# Patient Record
Sex: FEMALE | Race: BLACK OR AFRICAN AMERICAN | NOT HISPANIC OR LATINO | Employment: FULL TIME | URBAN - METROPOLITAN AREA
[De-identification: names, ages, dates, MRNs, and addresses within clinical notes are randomized per-mention and may not be internally consistent; named-entity substitution may affect disease eponyms.]

---

## 2017-02-13 ENCOUNTER — COMMUNICATION - HEALTHEAST (OUTPATIENT)
Dept: FAMILY MEDICINE | Facility: CLINIC | Age: 52
End: 2017-02-13

## 2017-02-13 DIAGNOSIS — F32.9 MAJOR DEPRESSIVE DISORDER, SINGLE EPISODE, UNSPECIFIED: ICD-10-CM

## 2017-02-22 ENCOUNTER — COMMUNICATION - HEALTHEAST (OUTPATIENT)
Dept: FAMILY MEDICINE | Facility: CLINIC | Age: 52
End: 2017-02-22

## 2017-02-22 ENCOUNTER — OFFICE VISIT - HEALTHEAST (OUTPATIENT)
Dept: FAMILY MEDICINE | Facility: CLINIC | Age: 52
End: 2017-02-22

## 2017-02-22 DIAGNOSIS — R50.9 FEVER: ICD-10-CM

## 2017-02-22 DIAGNOSIS — Z12.31 VISIT FOR SCREENING MAMMOGRAM: ICD-10-CM

## 2017-02-22 DIAGNOSIS — J02.0 ACUTE STREPTOCOCCAL PHARYNGITIS: ICD-10-CM

## 2017-02-22 ASSESSMENT — MIFFLIN-ST. JEOR: SCORE: 1084.14

## 2017-03-30 ENCOUNTER — COMMUNICATION - HEALTHEAST (OUTPATIENT)
Dept: FAMILY MEDICINE | Facility: CLINIC | Age: 52
End: 2017-03-30

## 2017-04-14 ENCOUNTER — COMMUNICATION - HEALTHEAST (OUTPATIENT)
Dept: FAMILY MEDICINE | Facility: CLINIC | Age: 52
End: 2017-04-14

## 2017-04-14 DIAGNOSIS — F32.9 MAJOR DEPRESSIVE DISORDER, SINGLE EPISODE, UNSPECIFIED: ICD-10-CM

## 2017-05-05 ENCOUNTER — OFFICE VISIT - HEALTHEAST (OUTPATIENT)
Dept: FAMILY MEDICINE | Facility: CLINIC | Age: 52
End: 2017-05-05

## 2017-05-05 ENCOUNTER — RECORDS - HEALTHEAST (OUTPATIENT)
Dept: GENERAL RADIOLOGY | Facility: CLINIC | Age: 52
End: 2017-05-05

## 2017-05-05 DIAGNOSIS — M25.572 PAIN IN LEFT ANKLE AND JOINTS OF LEFT FOOT: ICD-10-CM

## 2017-05-05 DIAGNOSIS — M25.572 LEFT ANKLE PAIN: ICD-10-CM

## 2017-05-05 DIAGNOSIS — G47.00 INSOMNIA: ICD-10-CM

## 2017-05-05 DIAGNOSIS — F32.9 MAJOR DEPRESSIVE DISORDER, SINGLE EPISODE, UNSPECIFIED: ICD-10-CM

## 2017-05-05 ASSESSMENT — MIFFLIN-ST. JEOR: SCORE: 1084.94

## 2017-05-07 ENCOUNTER — COMMUNICATION - HEALTHEAST (OUTPATIENT)
Dept: FAMILY MEDICINE | Facility: CLINIC | Age: 52
End: 2017-05-07

## 2017-08-09 ENCOUNTER — COMMUNICATION - HEALTHEAST (OUTPATIENT)
Dept: FAMILY MEDICINE | Facility: CLINIC | Age: 52
End: 2017-08-09

## 2017-11-17 ENCOUNTER — COMMUNICATION - HEALTHEAST (OUTPATIENT)
Dept: FAMILY MEDICINE | Facility: CLINIC | Age: 52
End: 2017-11-17

## 2017-11-17 DIAGNOSIS — F32.9 MAJOR DEPRESSIVE DISORDER, SINGLE EPISODE: ICD-10-CM

## 2017-12-04 ENCOUNTER — OFFICE VISIT - HEALTHEAST (OUTPATIENT)
Dept: FAMILY MEDICINE | Facility: CLINIC | Age: 52
End: 2017-12-04

## 2017-12-04 DIAGNOSIS — R23.2 HOT FLASHES: ICD-10-CM

## 2017-12-04 DIAGNOSIS — R51.9 RIGHT SIDED TEMPORAL HEADACHE: ICD-10-CM

## 2017-12-04 ASSESSMENT — MIFFLIN-ST. JEOR: SCORE: 1075.87

## 2017-12-07 ENCOUNTER — COMMUNICATION - HEALTHEAST (OUTPATIENT)
Dept: FAMILY MEDICINE | Facility: CLINIC | Age: 52
End: 2017-12-07

## 2017-12-07 ENCOUNTER — HOSPITAL ENCOUNTER (OUTPATIENT)
Dept: MRI IMAGING | Facility: CLINIC | Age: 52
Discharge: HOME | End: 2017-12-07
Attending: FAMILY MEDICINE

## 2017-12-07 DIAGNOSIS — R51.9 RIGHT SIDED TEMPORAL HEADACHE: ICD-10-CM

## 2017-12-12 ENCOUNTER — AMBULATORY - HEALTHEAST (OUTPATIENT)
Dept: FAMILY MEDICINE | Facility: CLINIC | Age: 52
End: 2017-12-12

## 2017-12-12 DIAGNOSIS — I63.89 RIGHT TEMPORAL LOBE INFARCTION (H): ICD-10-CM

## 2017-12-12 DIAGNOSIS — R51.9 HEADACHE: ICD-10-CM

## 2018-01-03 ENCOUNTER — AMBULATORY - HEALTHEAST (OUTPATIENT)
Dept: FAMILY MEDICINE | Facility: CLINIC | Age: 53
End: 2018-01-03

## 2018-06-23 ENCOUNTER — COMMUNICATION - HEALTHEAST (OUTPATIENT)
Dept: FAMILY MEDICINE | Facility: CLINIC | Age: 53
End: 2018-06-23

## 2018-06-23 DIAGNOSIS — F32.9 MAJOR DEPRESSIVE DISORDER, SINGLE EPISODE: ICD-10-CM

## 2019-02-18 ENCOUNTER — AMBULATORY - HEALTHEAST (OUTPATIENT)
Dept: FAMILY MEDICINE | Facility: CLINIC | Age: 54
End: 2019-02-18

## 2019-02-18 ENCOUNTER — OFFICE VISIT - HEALTHEAST (OUTPATIENT)
Dept: FAMILY MEDICINE | Facility: CLINIC | Age: 54
End: 2019-02-18

## 2019-02-18 DIAGNOSIS — Z12.31 VISIT FOR SCREENING MAMMOGRAM: ICD-10-CM

## 2019-02-18 DIAGNOSIS — M75.41 IMPINGEMENT SYNDROME OF SHOULDER REGION, RIGHT: ICD-10-CM

## 2019-02-18 DIAGNOSIS — F32.9 MAJOR DEPRESSIVE DISORDER, SINGLE EPISODE: ICD-10-CM

## 2019-02-18 DIAGNOSIS — M25.511 ACUTE PAIN OF RIGHT SHOULDER: ICD-10-CM

## 2019-02-18 RX ORDER — CHLORAL HYDRATE 500 MG
2 CAPSULE ORAL DAILY
Status: SHIPPED | COMMUNITY
Start: 2019-02-18

## 2019-02-21 ENCOUNTER — COMMUNICATION - HEALTHEAST (OUTPATIENT)
Dept: FAMILY MEDICINE | Facility: CLINIC | Age: 54
End: 2019-02-21

## 2019-02-25 ENCOUNTER — OFFICE VISIT - HEALTHEAST (OUTPATIENT)
Dept: FAMILY MEDICINE | Facility: CLINIC | Age: 54
End: 2019-02-25

## 2019-02-25 DIAGNOSIS — F32.0 CURRENT MILD EPISODE OF MAJOR DEPRESSIVE DISORDER WITHOUT PRIOR EPISODE (H): ICD-10-CM

## 2019-02-25 DIAGNOSIS — M75.21 BICEPS TENDINITIS OF RIGHT UPPER EXTREMITY: ICD-10-CM

## 2019-02-25 DIAGNOSIS — M75.01 ADHESIVE CAPSULITIS OF RIGHT SHOULDER: ICD-10-CM

## 2019-03-28 ENCOUNTER — COMMUNICATION - HEALTHEAST (OUTPATIENT)
Dept: FAMILY MEDICINE | Facility: CLINIC | Age: 54
End: 2019-03-28

## 2019-03-28 ENCOUNTER — RECORDS - HEALTHEAST (OUTPATIENT)
Dept: ADMINISTRATIVE | Facility: OTHER | Age: 54
End: 2019-03-28

## 2019-04-11 ENCOUNTER — COMMUNICATION - HEALTHEAST (OUTPATIENT)
Dept: SCHEDULING | Facility: CLINIC | Age: 54
End: 2019-04-11

## 2019-04-11 ENCOUNTER — HOSPITAL ENCOUNTER (OUTPATIENT)
Dept: CT IMAGING | Facility: CLINIC | Age: 54
Discharge: HOME | End: 2019-04-11
Attending: FAMILY MEDICINE

## 2019-04-11 ENCOUNTER — AMBULATORY - HEALTHEAST (OUTPATIENT)
Dept: FAMILY MEDICINE | Facility: CLINIC | Age: 54
End: 2019-04-11

## 2019-04-11 ENCOUNTER — OFFICE VISIT - HEALTHEAST (OUTPATIENT)
Dept: FAMILY MEDICINE | Facility: CLINIC | Age: 54
End: 2019-04-11

## 2019-04-11 DIAGNOSIS — R10.9 ACUTE RIGHT FLANK PAIN: ICD-10-CM

## 2019-04-11 LAB
ALBUMIN UR-MCNC: ABNORMAL MG/DL
ANION GAP SERPL CALCULATED.3IONS-SCNC: 9 MMOL/L (ref 5–18)
APPEARANCE UR: ABNORMAL
BILIRUB UR QL STRIP: NEGATIVE
BUN SERPL-MCNC: 12 MG/DL (ref 8–22)
CALCIUM SERPL-MCNC: 9.3 MG/DL (ref 8.5–10.5)
CHLORIDE BLD-SCNC: 108 MMOL/L (ref 98–107)
CO2 SERPL-SCNC: 27 MMOL/L (ref 22–31)
COLOR UR AUTO: YELLOW
CREAT SERPL-MCNC: 0.83 MG/DL (ref 0.6–1.1)
ERYTHROCYTE [DISTWIDTH] IN BLOOD BY AUTOMATED COUNT: 12 % (ref 11–14.5)
GFR SERPL CREATININE-BSD FRML MDRD: >60 ML/MIN/1.73M2
GLUCOSE BLD-MCNC: 103 MG/DL (ref 70–125)
GLUCOSE UR STRIP-MCNC: NEGATIVE MG/DL
HCT VFR BLD AUTO: 36.6 % (ref 35–47)
HGB BLD-MCNC: 12.1 G/DL (ref 12–16)
HGB UR QL STRIP: ABNORMAL
KETONES UR STRIP-MCNC: NEGATIVE MG/DL
LEUKOCYTE ESTERASE UR QL STRIP: ABNORMAL
MCH RBC QN AUTO: 27.9 PG (ref 27–34)
MCHC RBC AUTO-ENTMCNC: 33 G/DL (ref 32–36)
MCV RBC AUTO: 85 FL (ref 80–100)
NITRATE UR QL: NEGATIVE
PH UR STRIP: 7 [PH] (ref 5–8)
PLATELET # BLD AUTO: 231 THOU/UL (ref 140–440)
PMV BLD AUTO: 8.1 FL (ref 7–10)
POTASSIUM BLD-SCNC: 3.9 MMOL/L (ref 3.5–5)
RBC # BLD AUTO: 4.32 MILL/UL (ref 3.8–5.4)
SODIUM SERPL-SCNC: 144 MMOL/L (ref 136–145)
SP GR UR STRIP: 1.02 (ref 1–1.03)
UROBILINOGEN UR STRIP-ACNC: ABNORMAL
WBC: 6.7 THOU/UL (ref 4–11)

## 2019-04-12 LAB — BACTERIA SPEC CULT: NORMAL

## 2019-04-15 ENCOUNTER — COMMUNICATION - HEALTHEAST (OUTPATIENT)
Dept: FAMILY MEDICINE | Facility: CLINIC | Age: 54
End: 2019-04-15

## 2019-04-17 ENCOUNTER — OFFICE VISIT - HEALTHEAST (OUTPATIENT)
Dept: FAMILY MEDICINE | Facility: CLINIC | Age: 54
End: 2019-04-17

## 2019-04-17 DIAGNOSIS — M54.50 ACUTE RIGHT-SIDED LOW BACK PAIN WITHOUT SCIATICA: ICD-10-CM

## 2019-04-17 DIAGNOSIS — M75.01 ADHESIVE CAPSULITIS OF RIGHT SHOULDER: ICD-10-CM

## 2019-04-19 ENCOUNTER — THERAPY VISIT (OUTPATIENT)
Dept: PHYSICAL THERAPY | Facility: CLINIC | Age: 54
End: 2019-04-19
Payer: COMMERCIAL

## 2019-04-19 DIAGNOSIS — M25.511 RIGHT SHOULDER PAIN: ICD-10-CM

## 2019-04-19 PROCEDURE — 97110 THERAPEUTIC EXERCISES: CPT | Mod: GP | Performed by: PHYSICAL THERAPIST

## 2019-04-19 PROCEDURE — 97161 PT EVAL LOW COMPLEX 20 MIN: CPT | Mod: GP | Performed by: PHYSICAL THERAPIST

## 2019-04-19 PROCEDURE — 97112 NEUROMUSCULAR REEDUCATION: CPT | Mod: GP | Performed by: PHYSICAL THERAPIST

## 2019-04-19 NOTE — LETTER
[facility] ATHLETIC [facility] PHYSICAL [facility]  [address] [address]  [phone]    2019    Re: Deandra Colón   :   1965  MRN:  [id number]   REFERRING PHYSICIAN:   Chris Edwards    [facility]    Date of Initial Evaluation:  2019  Visits:  Rxs Used: 1  Reason for Referral:  Right shoulder pain    [facility]    Subjective:  Deandra Colón is a 54 year old female with a right shoulder condition. Condition occurred with:  Unknown cause. This is a new condition     Patient reports pain:  Anterior, lateral and posterior. Pain is described as aching and is intermittent and reported as 8/10.  Associated symptoms:  Loss of strength. Symptoms are exacerbated by lying on extremity and other (using computer mouse) and relieved by rest.  Since onset symptoms are unchanged.  Special tests:  MRI (imflammation - per patient).  Previous treatment: injections anterior and posterior.  There was no improvement following previous treatment.  General health as reported by patient is good.  Pertinent medical history includes:  Other (recent cardiac arrhythmia detected. Stress test pending for19.  Recent R LBP has resolved.).  Medical allergies: no.  Other surgeries include:  None reported. Patient is working in normal job without restrictions.  Employment tasks: computer use.    Barriers: Ergonomic mouse. Ongoing assessment in progress.    Red flags:  None as reported by the patient.    Objective:  Shoulder Evaluation    ROM:    AROM:  : Ext IR: L=T4, R=T12. All other are WNL and painfree.    Strength:    Abduction:  Left: 4/5   Pain:-    Right: 3+/5      Pain:++  Adduction:  Left: 4/5     Pain:-    Right: 4/5      Pain:-  Internal Rotation:  Left:4+/5      Pain:-    Right: 4+/5      Pain:-  External Rotation:   Left:3+/5      Pain:-   Right:3+/5      Pain:-    Horizontal  Adduction:  Right:/5     Pain:-  Elbow Flexion:  Left:4+/5      Pain:-    Right:4+/5      Pain:-  Elbow Extension:  Left:4/5      Pain:-    Right:4/5     Pain:  Re: Deandra Colón   :   1965    Special Tests:    Left shoulder negative for the following special tests:  Impingement  Right shoulder negative for the following special tests:Impingement    Mobility Tests:    Glenohumeral posterior left:  Normal  Glenohumeral posterior right:  Hypomobile    Assessment/Plan:    Signs and symptoms consistent with R Supraspinatus tendonitis with mild impingement.    Please note that patient was also referred for right low back pain. She stated that it has essentially resolved. She chose to forego evaluation today, with the understanding that it could be done at a future appointment if necessary.    Patient is a 54 year old female with right side shoulder complaints.    Patient has the following significant findings with corresponding treatment plan.                Diagnosis 1:  R shoulder Pain  Pain -  hot/cold therapy, education and home program  Decreased ROM/flexibility - therapeutic exercise and home program  Decreased joint mobility - manual therapy and therapeutic exercise  Decreased strength - therapeutic exercise, therapeutic activities and home program  Inflammation - cold therapy  Decreased function - therapeutic activities and home program  Impaired posture - neuro re-education, therapeutic activities and home program    Therapy Evaluation Codes:   1) History comprised of:   Personal factors that impact the plan of care:      None.    Comorbidity factors that impact the plan of care are:      None.     Medications impacting care: None.  2) Examination of Body Systems comprised of:   Body structures and functions that impact the plan of care:      Shoulder.   Activity limitations that impact the plan of care are:      Lifting, Reading/Computer work, Working and Sleeping.  3) Clinical presentation  characteristics are:   Stable/Uncomplicated.  4) Decision-Making    Low complexity using standardized patient assessment instrument and/or   measureable assessment of functional outcome.  Cumulative Therapy Evaluation is: Low complexity.    Previous and current functional limitations:  (See Goal Flow Sheet for this information)    Short term and Long term goals: (See Goal Flow Sheet for this information)     Communication ability:  Patient appears to be able to clearly communicate and understand verbal and written communication and follow directions correctly.  Treatment Explanation - The following has been discussed with the patient:   RX ordered/plan of care  Re: Deandra Colón   :   1965    Anticipated outcomes  This patient would benefit from PT intervention to resume normal activities.   Rehab potential is good.    Frequency:  1 X week, once daily  Duration:  for 4 weeks tapering to 2 X a month over 4 weeks  Discharge Plan:  Achieve all LTG.  Independent in home treatment program.  Reach maximal therapeutic benefit.    Please refer to the daily flowsheet for treatment today, total treatment time and time spent performing 1:1 timed codes.       Thank you for your referral.      INQUIRIES  Therapist: Laura Velazquez, PT  [facility] PHYSICAL THERAPY  [address] [place] [id number]  Phone: [phone]  Fax: [phone]

## 2019-04-22 ENCOUNTER — COMMUNICATION - HEALTHEAST (OUTPATIENT)
Dept: FAMILY MEDICINE | Facility: CLINIC | Age: 54
End: 2019-04-22

## 2019-04-22 ENCOUNTER — RECORDS - HEALTHEAST (OUTPATIENT)
Dept: ADMINISTRATIVE | Facility: OTHER | Age: 54
End: 2019-04-22

## 2019-04-22 DIAGNOSIS — F32.9 MAJOR DEPRESSIVE DISORDER, SINGLE EPISODE: ICD-10-CM

## 2019-07-10 ENCOUNTER — COMMUNICATION - HEALTHEAST (OUTPATIENT)
Dept: SCHEDULING | Facility: CLINIC | Age: 54
End: 2019-07-10

## 2019-07-10 DIAGNOSIS — M75.01 ADHESIVE CAPSULITIS OF RIGHT SHOULDER: ICD-10-CM

## 2019-07-10 RX ORDER — IBUPROFEN 600 MG/1
600 TABLET, FILM COATED ORAL EVERY 6 HOURS PRN
Qty: 60 TABLET | Refills: 1 | Status: SHIPPED | OUTPATIENT
Start: 2019-07-10

## 2019-08-08 ENCOUNTER — COMMUNICATION - HEALTHEAST (OUTPATIENT)
Dept: SCHEDULING | Facility: CLINIC | Age: 54
End: 2019-08-08

## 2019-08-08 DIAGNOSIS — M75.21 BICEPS TENDINITIS OF RIGHT UPPER EXTREMITY: ICD-10-CM

## 2019-08-08 DIAGNOSIS — M75.01 ADHESIVE CAPSULITIS OF RIGHT SHOULDER: ICD-10-CM

## 2019-08-16 ENCOUNTER — RECORDS - HEALTHEAST (OUTPATIENT)
Dept: ADMINISTRATIVE | Facility: OTHER | Age: 54
End: 2019-08-16

## 2019-10-04 ENCOUNTER — COMMUNICATION - HEALTHEAST (OUTPATIENT)
Dept: FAMILY MEDICINE | Facility: CLINIC | Age: 54
End: 2019-10-04

## 2019-10-10 ENCOUNTER — OFFICE VISIT - HEALTHEAST (OUTPATIENT)
Dept: FAMILY MEDICINE | Facility: CLINIC | Age: 54
End: 2019-10-10

## 2019-10-10 DIAGNOSIS — M54.2 NECK PAIN: ICD-10-CM

## 2019-10-10 DIAGNOSIS — M75.01 ADHESIVE CAPSULITIS OF RIGHT SHOULDER: ICD-10-CM

## 2019-10-10 ASSESSMENT — ANXIETY QUESTIONNAIRES
3. WORRYING TOO MUCH ABOUT DIFFERENT THINGS: SEVERAL DAYS
4. TROUBLE RELAXING: NOT AT ALL
2. NOT BEING ABLE TO STOP OR CONTROL WORRYING: SEVERAL DAYS
7. FEELING AFRAID AS IF SOMETHING AWFUL MIGHT HAPPEN: SEVERAL DAYS
5. BEING SO RESTLESS THAT IT IS HARD TO SIT STILL: NOT AT ALL
6. BECOMING EASILY ANNOYED OR IRRITABLE: SEVERAL DAYS
1. FEELING NERVOUS, ANXIOUS, OR ON EDGE: NOT AT ALL
GAD7 TOTAL SCORE: 4

## 2019-10-10 ASSESSMENT — MIFFLIN-ST. JEOR: SCORE: 1142.78

## 2019-10-10 ASSESSMENT — PATIENT HEALTH QUESTIONNAIRE - PHQ9: SUM OF ALL RESPONSES TO PHQ QUESTIONS 1-9: 4

## 2019-10-14 ENCOUNTER — COMMUNICATION - HEALTHEAST (OUTPATIENT)
Dept: SCHEDULING | Facility: CLINIC | Age: 54
End: 2019-10-14

## 2020-01-23 ENCOUNTER — OFFICE VISIT - HEALTHEAST (OUTPATIENT)
Dept: FAMILY MEDICINE | Facility: CLINIC | Age: 55
End: 2020-01-23

## 2020-01-23 DIAGNOSIS — R14.0 ABDOMINAL BLOATING: ICD-10-CM

## 2020-01-23 LAB
ALBUMIN UR-MCNC: NEGATIVE MG/DL
APPEARANCE UR: CLEAR
BACTERIA #/AREA URNS HPF: ABNORMAL HPF
BILIRUB UR QL STRIP: NEGATIVE
COLOR UR AUTO: YELLOW
GLUCOSE UR STRIP-MCNC: NEGATIVE MG/DL
HGB UR QL STRIP: NEGATIVE
KETONES UR STRIP-MCNC: NEGATIVE MG/DL
LEUKOCYTE ESTERASE UR QL STRIP: ABNORMAL
NITRATE UR QL: NEGATIVE
PH UR STRIP: 5.5 [PH] (ref 5–8)
RBC #/AREA URNS AUTO: ABNORMAL HPF
SP GR UR STRIP: <=1.005 (ref 1–1.03)
SQUAMOUS #/AREA URNS AUTO: ABNORMAL LPF
UROBILINOGEN UR STRIP-ACNC: ABNORMAL
WBC #/AREA URNS AUTO: ABNORMAL HPF

## 2020-01-23 ASSESSMENT — MIFFLIN-ST. JEOR: SCORE: 1163.19

## 2020-01-24 ENCOUNTER — HOSPITAL ENCOUNTER (OUTPATIENT)
Dept: ULTRASOUND IMAGING | Facility: CLINIC | Age: 55
Discharge: HOME | End: 2020-01-24
Attending: FAMILY MEDICINE

## 2020-01-24 DIAGNOSIS — R14.0 ABDOMINAL BLOATING: ICD-10-CM

## 2020-01-24 LAB
BACTERIA SPEC CULT: NO GROWTH
CANCER AG125 SERPL-ACNC: 8.3 U/ML (ref 0–35)

## 2020-01-27 ENCOUNTER — COMMUNICATION - HEALTHEAST (OUTPATIENT)
Dept: FAMILY MEDICINE | Facility: CLINIC | Age: 55
End: 2020-01-27

## 2020-01-27 LAB
GLIADIN IGA SER-ACNC: 8.4 U/ML
GLIADIN IGG SER-ACNC: <0.4 U/ML
IGA SERPL-MCNC: 120 MG/DL (ref 65–400)
TTG IGA SER-ACNC: 0.3 U/ML
TTG IGG SER-ACNC: <0.6 U/ML

## 2021-04-19 ENCOUNTER — AMBULATORY - HEALTHEAST (OUTPATIENT)
Dept: NURSING | Facility: CLINIC | Age: 56
End: 2021-04-19

## 2021-05-10 ENCOUNTER — AMBULATORY - HEALTHEAST (OUTPATIENT)
Dept: NURSING | Facility: CLINIC | Age: 56
End: 2021-05-10

## 2021-05-26 ASSESSMENT — PATIENT HEALTH QUESTIONNAIRE - PHQ9: SUM OF ALL RESPONSES TO PHQ QUESTIONS 1-9: 4

## 2021-05-27 NOTE — TELEPHONE ENCOUNTER
New Appointment Needed  What is the reason for the visit:    F/U Test results     Provider Preference: PCP only     How soon do you need to be seen?: Asap - Patient has appointment scheduled for Wednesday 04/24 at 4:20pm but looking to be seen this week if possible.  Early morning or late afternoon works best.      Waitlist offered?: No    Okay to leave a detailed message:  Yes

## 2021-05-27 NOTE — TELEPHONE ENCOUNTER
Chest pain that doesn't go away    Pain is right in the center    Feels it in her throat    Began days ago but became intense today    Lasts two or three minutes but today it is not going away    Rates her pain at a 6 out of 10    Doesn't radiate into the neck, jaw, arms or back    Doesn't know what is causing the symptom    Has a cough    No dizziness, nausea, no fever, no shortness of breath,     Feels like her heart is beating very fast    Advised that she go to the ER.    Umu Bolanos RN  [facility] Medication Refill and Triage Nurse  3/28/2019  10:43 AM      Reason for Disposition    Heart beating irregularly or very rapidly    Protocols used: CHEST PAIN-A-OH

## 2021-05-27 NOTE — TELEPHONE ENCOUNTER
Left message to call back for: appointment date ok  Information to relay to patient:  Re-scheduled appointment for patient to be seen 4/17/19 @9am with Dr. Edwards. Please re-scheduled if patient would like a different appointment date/time.

## 2021-05-27 NOTE — PROGRESS NOTES
OFFICE VISIT - FAMILY MEDICINE     ASSESSMENT AND PLAN     1. Acute right flank pain  HM2(CBC w/o Differential)    Urinalysis-UC if Indicated    Basic Metabolic Panel    CT Abdomen Pelvis Without Oral Without IV Contrast    Culture, Urine    ketorolac injection 30 mg (TORADOL)    oxyCODONE-acetaminophen (PERCOCET) 5-325 mg per tablet   Right flank pain, differential diagnosis discussed with the patient included kidney stone, musculoskeletal pain or acute abdomen, CT scan done stat and read by radiologist and discussed with the patient did not show any obvious acute abnormalities, question possible phleboliths at the right UVJ but no sign of obstruction, result was discussed with the patient, she was given Toradol IM with minimal improvement of her pain, we discussed about pain management with short-term use of oxycodone and possible side effect, medication was sent to her pharmacy, she was also instructed to go to the ER if her symptoms get worse.    CHIEF COMPLAINT   Back Pain (rt. side; started this morning) and Chest Pain (interrmiten)    HPI   Deandra Colón is a 54 y.o. female.  Updated [facility] - Mammogram Ordered, 08/01/17 - Needs Aspirin/Lipid  Right flank pain started this morning after bending to  something on the floor, discussed her pain is sharp, moderate intensity, localized in the right back area, does not radiated to the abdomen, she denies any nausea vomiting.  She had a bowel movement today with no blood.  Denies any constipation.  Has not taken any medication for her symptoms.  Recently seen in the ER for chest pain, workup was negative, scheduled for stress test today.      Review of Systems As per HPI, otherwise negative.    OBJECTIVE   /90 (Patient Site: Left Arm, Patient Position: Sitting, Cuff Size: Adult Regular)   Pulse 63   SpO2 99%   Physical Exam   Constitutional: She is oriented to person, place, and time. She appears well-developed and well-nourished.   HENT:   Head:  Normocephalic and atraumatic.   Neck: Normal range of motion. Neck supple. No JVD present. No tracheal deviation present. No thyromegaly present.   Cardiovascular: Normal rate, regular rhythm, normal heart sounds and intact distal pulses. Exam reveals no gallop and no friction rub.   No murmur heard.  Pulmonary/Chest: Effort normal and breath sounds normal. No respiratory distress. She has no wheezes. She has no rales.   Abdominal: She exhibits no distension. There is tenderness (Right flank and right lower quadrant area.). There is no rebound and no guarding.   Musculoskeletal: She exhibits no edema or tenderness.   Lymphadenopathy:     She has no cervical adenopathy.   Neurological: She is alert and oriented to person, place, and time. Coordination normal.   Psychiatric: She has a normal mood and affect. Judgment and thought content normal.       PFSH     Family History   Problem Relation Age of Onset     Vision loss Mother      Glaucoma Mother      Kidney disease Father      Diabetes Father      Social History     Socioeconomic History     Marital status: Single     Spouse name: Not on file     Number of children: Not on file     Years of education: Not on file     Highest education level: Not on file   Occupational History     Not on file   Social Needs     Financial resource strain: Not on file     Food insecurity:     Worry: Not on file     Inability: Not on file     Transportation needs:     Medical: Not on file     Non-medical: Not on file   Tobacco Use     Smoking status: Never Smoker     Smokeless tobacco: Never Used   Substance and Sexual Activity     Alcohol use: Not on file     Drug use: Not on file     Sexual activity: Not on file   Lifestyle     Physical activity:     Days per week: Not on file     Minutes per session: Not on file     Stress: Not on file   Relationships     Social connections:     Talks on phone: Not on file     Gets together: Not on file     Attends [employer] service: Not on file      Active member of club or organization: Not on file     Attends meetings of clubs or organizations: Not on file     Relationship status: Not on file     Intimate partner violence:     Fear of current or ex partner: Not on file     Emotionally abused: Not on file     Physically abused: Not on file     Forced sexual activity: Not on file   Other Topics Concern     Not on file   Social History Narrative     Not on file     Relevant history was reviewed with the patient today, unless noted in HPI, nothing is pertinent for this visit.  [facility]     Patient Active Problem List    Diagnosis Date Noted     Adhesive capsulitis of right shoulder 02/25/2019     Biceps tendinitis of right upper extremity 02/25/2019     Right sided temporal headache 12/04/2017     Chronic Post-traumatic Stress Disorder      Overview Note:     Created by Conversion         Major Depression, Single Episode      Overview Note:     Created by Conversion         Past Surgical History:   Procedure Laterality Date     BARTHOLIN GLAND CYST EXCISION  08/27/2009     PARTIAL HYSTERECTOMY  04/2010       RESULTS/CONSULTS (Lab/Rad)     Recent Results (from the past 168 hour(s))   HM2(CBC w/o Differential)   Result Value Ref Range    WBC 6.7 4.0 - 11.0 thou/uL    RBC 4.32 3.80 - 5.40 mill/uL    Hemoglobin 12.1 12.0 - 16.0 g/dL    Hematocrit 36.6 35.0 - 47.0 %    MCV 85 80 - 100 fL    MCH 27.9 27.0 - 34.0 pg    MCHC 33.0 32.0 - 36.0 g/dL    RDW 12.0 11.0 - 14.5 %    Platelets 231 140 - 440 thou/uL    MPV 8.1 7.0 - 10.0 fL   Basic Metabolic Panel   Result Value Ref Range    Sodium 144 136 - 145 mmol/L    Potassium 3.9 3.5 - 5.0 mmol/L    Chloride 108 (H) 98 - 107 mmol/L    CO2 27 22 - 31 mmol/L    Anion Gap, Calculation 9 5 - 18 mmol/L    Glucose 103 70 - 125 mg/dL    Calcium 9.3 8.5 - 10.5 mg/dL    BUN 12 8 - 22 mg/dL    Creatinine 0.83 0.60 - 1.10 mg/dL    GFR MDRD Af Amer >60 >60 mL/min/1.73m2    GFR MDRD Non Af Amer >60 >60 mL/min/1.73m2   Urinalysis-UC if  Indicated   Result Value Ref Range    Color, UA Yellow Colorless, Yellow, Straw, Light Yellow    Clarity, UA Cloudy (!) Clear    Glucose, UA Negative Negative    Bilirubin, UA Negative Negative    Ketones, UA Negative Negative    Specific Gravity, UA 1.020 1.005 - 1.030    Blood, UA Trace (!) Negative    pH, UA 7.0 5.0 - 8.0    Protein,  mg/dL (!) Negative mg/dL    Urobilinogen, UA 0.2 E.U./dL 0.2 E.U./dL, 1.0 E.U./dL    Nitrite, UA Negative Negative    Leukocytes, UA Large (!) Negative     Ct Abdomen Pelvis Without Oral Without Iv Contrast    Result Date: 4/11/2019  EXAM: CT ABDOMEN PELVIS WO ORAL WO IV CONTRAST LOCATION: [facility] DATE/TIME: 4/11/2019 10:33 AM INDICATION: Kidney stone right flank pain COMPARISON: None. TECHNIQUE: Helical thin-section CT scan of the abdomen and pelvis was performed without oral or IV contrast. Multiplanar reformats were obtained. Dose reduction techniques were used. CONTRAST: None. FINDINGS: LUNG BASES: Negative. ABDOMEN: Normal kidneys, no stones or hydronephrosis. Liver, gallbladder, pancreas and spleen are negative. PELVIS: There are 3 tiny 1 mm punctate calcifications along right side of pelvis near the expected position of the UVJ, all of these are likely phleboliths though can't completely exclude a nonobstructing distal ureteral stone, ureter is nondilated and not discretely visualized. No bladder stones. Bowel unremarkable with appendix appearing normal. No adnexal lesions or free fluid. MUSCULOSKELETAL: Negative.     CONCLUSION: 1.  No etiology for symptoms evident. No definite urinary tract stones or hydronephrosis. No inflammatory changes of bowel. 2.  There are 3 tiny punctate presumed phleboliths near right UVJ as described.    MEDICATIONS     Current Outpatient Medications on File Prior to Visit   Medication Sig Dispense Refill     acetaminophen (TYLENOL EXTRA STRENGTH) 500 MG tablet Take 1-2 tablets by mouth every 4-6 hours as needed for pain.  Do  not take more than 8 tablets in one day.       FLUoxetine (PROZAC) 20 MG capsule Take 1 capsule (20 mg total) by mouth daily. 30 capsule 2     ibuprofen (ADVIL,MOTRIN) 200 MG tablet Take 200 mg by mouth as needed for pain.       multivitamin therapeutic (THERAGRAN) tablet Take 1 tablet by mouth daily.       omega-3/dha/epa/fish oil (FISH OIL-OMEGA-3 FATTY ACIDS) 300-1,000 mg capsule Take 2 g by mouth daily.       No current facility-administered medications on file prior to visit.        HEALTH MAINTENANCE / SCREENING   PHQ-2 Total Score: 0 (2/18/2019  3:00 PM)  , PHQ-9 Total Score: 4 (2/18/2019  3:00 PM)  ,PILAR 7 Total Score: 2 (2/18/2019  3:00 PM)    Immunization History   Administered Date(s) Administered     Hep A, Adult IM (19yr & older) 03/29/2010     Hep A, historic 03/29/2010     Influenza, seasonal,quad inj 6-35 mos 09/29/2011     Influenza,seasonal, Inj IIV3 09/29/2011     Tdap 03/29/2010     Health Maintenance   Topic     INFLUENZA VACCINE RULE BASED (1)     DEPRESSION FOLLOW UP      TDAP ADULT ONE TIME DOSE      ADVANCE DIRECTIVES DISCUSSED WITH PATIENT      MAMMOGRAM      COLONOSCOPY      TD 18+ HE      PAP SMEAR        Chris Edwards MD  Family Medicine, [facility]     This note was dictated using a voice recognition software.  Any grammatical or context distortion are unintentional and inherent to the software.

## 2021-05-27 NOTE — TELEPHONE ENCOUNTER
"  Call form patient     CC:  R-sided ABD pain asso with passing BM  - was \"purple\" in color         No fever    Constant / consistent pain 15min    Pain is 9 or 10    No problems peeing   No blood in urine     No numbness or tingling anywhere       A/P:   > Referred to ED for eval due to pain   > She does have a ride          Kimo Bernal RN   Triage and Medication Refills             Reason for Disposition    SEVERE abdominal pain (e.g., excruciating)    Protocols used: ABDOMINAL PAIN - UPPER-A-OH      "

## 2021-05-28 ASSESSMENT — ANXIETY QUESTIONNAIRES: GAD7 TOTAL SCORE: 4

## 2021-05-28 NOTE — TELEPHONE ENCOUNTER
Refill Approved    Rx renewed per Medication Renewal Policy. Medication was last renewed on 2/18/19.    Yulisa Zuleta, [facility] Triage/Med Refill 4/24/2019     Requested Prescriptions   Pending Prescriptions Disp Refills     FLUoxetine (PROZAC) 20 MG capsule [Pharmacy Med Name: FLUOXETINE 20 MG CAPSULE** 20 CAP] 30 capsule 2     Sig: TAKE 1 CAPSULE (20 MG TOTAL) BY MOUTH DAILY.       SSRI Refill Protocol  Passed - 4/22/2019 11:19 AM        Passed - PCP or prescribing provider visit in last year     Last office visit with prescriber/PCP: 4/17/2019 Chris Edwards MD OR same dept: 4/17/2019 Chris Edwards MD OR same specialty: 4/17/2019 Chris Edwards MD  Last physical: Visit date not found Last MTM visit: Visit date not found   Next visit within 3 mo: Visit date not found  Next physical within 3 mo: Visit date not found  Prescriber OR PCP: Chris Edwards MD  Last diagnosis associated with med order: 1. Major depressive disorder, single episode  - FLUoxetine (PROZAC) 20 MG capsule [Pharmacy Med Name: FLUOXETINE 20 MG CAPSULE** 20 CAP]; Take 1 capsule (20 mg total) by mouth daily.  Dispense: 30 capsule; Refill: 2    If protocol passes may refill for 12 months if within 3 months of last provider visit (or a total of 15 months).

## 2021-05-30 VITALS — BODY MASS INDEX: 23.79 KG/M2 | WEIGHT: 126 LBS | HEIGHT: 61 IN

## 2021-05-30 NOTE — TELEPHONE ENCOUNTER
Patient Returning Call  Reason for call:  Patient called back.  Information relayed to patient:  Informed of Dr Edwards's message listed below.  Patient states understanding and agrees with plan.  Patient has additional questions:  No  If YES, what are your questions/concerns:    Okay to leave a detailed message?: No call back needed

## 2021-05-30 NOTE — TELEPHONE ENCOUNTER
Ibuprofen 600 mg to take every 6 hours as needed with full stomach prescription sent to her pharmacy.

## 2021-05-30 NOTE — TELEPHONE ENCOUNTER
Left message to call back for: Pt.  Information to relay to patient:  Please relay message below per MD.

## 2021-05-30 NOTE — TELEPHONE ENCOUNTER
RN triage  Patient is calling in regards to her R shoulder. She states that she has had injections in that shoulder by PCP. She states that her shoulder pain has come back and has been constant for awhile now. Rates pain as 8-9/10. She states that using arm intensifies the pain. At times the pain radiates to the back of her neck. She states sleeping is difficult.  She denies chest pain, shortness of breath, loss of strength or swelling in the joint or arm, denies fever.  Patient states that she had an MRI and was told she has a lot of inflammation in that shoulder.  Patient has tried ibuprofen, tylenol, heat and ice without relief.  Advised patient to be seen in clinic. Patient declined and would like a message sent to PCP for a medication, an anti-inflammatory.  Reviewed signs and symptoms of when to call back.  Please advise thank you.  Preeti Muñoz, RN  [facility] Triage Nurse  1:20 PM  7/10/2019      Reason for Disposition    MODERATE pain (e.g., interferes with normal activities) and present > 3 days    Protocols used: SHOULDER PAIN-A-OH

## 2021-05-31 VITALS — BODY MASS INDEX: 23.41 KG/M2 | HEIGHT: 61 IN | WEIGHT: 124 LBS

## 2021-05-31 NOTE — TELEPHONE ENCOUNTER
I did place a referral to be seen by an orthopedist specialist to discuss about other treatment included additional steroid injection.  She should  expect a call from our .

## 2021-05-31 NOTE — TELEPHONE ENCOUNTER
Call from pt       Status update re shoulder pain / steroid injection     Most recent one she recalls was April 2019    Not improving and wondering about further care?  Additional steroid injections ?     Pt indicated she would be able to come in for eval if needed as well        [phone]

## 2021-05-31 NOTE — TELEPHONE ENCOUNTER
Spoke with patient, relayed message below per MD. Patient verbalized understanding, has questions on insurance if it will pay to see specialist.

## 2021-06-02 VITALS — BODY MASS INDEX: 25.67 KG/M2 | WEIGHT: 134.75 LBS

## 2021-06-02 VITALS — WEIGHT: 135.75 LBS | BODY MASS INDEX: 25.86 KG/M2

## 2021-06-02 VITALS — BODY MASS INDEX: 25.91 KG/M2 | WEIGHT: 136 LBS

## 2021-06-02 NOTE — TELEPHONE ENCOUNTER
Made change to FMLA form, re-faxed to employer. Spoke with patient, relayed we made changes and faxed to employer.

## 2021-06-02 NOTE — TELEPHONE ENCOUNTER
FMLA form filled out 10/10/19.      Form states that she can only take 2-3 hours per week for FMLA.    Per patient the form should state she could work 5 hours per day.   Can the form be changed to reflect this request?    Shoulder gets tired. She explained at the office visit if you 4 hours can she take 4 hours of FMLA?   She will call be with the fax number. She stated the clinic has the fax number in the paperwork.     Melina House RN, BSN [facility] Triage Nurse

## 2021-06-02 NOTE — TELEPHONE ENCOUNTER
Who is calling:  Patient   Reason for Call:  Calling to check on below request. Please advise!  Date of last appointment with primary care: 10/10/19  Okay to leave a detailed message: Yes

## 2021-06-02 NOTE — TELEPHONE ENCOUNTER
Who is calling:  Patient  Reason for Call:  Patient is wanting a letter to get medical leave from work. Patient is wanting a call back to discuss more in detail. Patient is wanting to know if an operation will help with the scar tissue?  Date of last appointment with primary care: 04.17.19  Okay to leave a detailed message: Yes

## 2021-06-03 VITALS
HEART RATE: 67 BPM | HEIGHT: 61 IN | BODY MASS INDEX: 26.2 KG/M2 | WEIGHT: 138.75 LBS | SYSTOLIC BLOOD PRESSURE: 89 MMHG | OXYGEN SATURATION: 100 % | DIASTOLIC BLOOD PRESSURE: 60 MMHG

## 2021-06-04 VITALS
TEMPERATURE: 97.6 F | DIASTOLIC BLOOD PRESSURE: 68 MMHG | SYSTOLIC BLOOD PRESSURE: 100 MMHG | HEART RATE: 71 BPM | WEIGHT: 143.25 LBS | BODY MASS INDEX: 27.05 KG/M2 | OXYGEN SATURATION: 96 % | HEIGHT: 61 IN

## 2021-06-05 NOTE — PROGRESS NOTES
"OFFICE VISIT - FAMILY MEDICINE     ASSESSMENT AND PLAN     1. Abdominal bloating  Urinalysis-UC if Indicated     (Cancer Antigen 125)    Celiac(Gluten)Antibody Panel    US Pelvis With Transvaginal Non OB    Culture, Urine   Differential diagnosis discussed included GI,  and GYN pathologies, epic record showed hysterectomy but patient under the impression that she had a oophorectomy, will get an ultrasound, Ca1 25 urine analysis, also discussed GI etiology, minimize gluten rich food, follow-up if not improving.    CHIEF COMPLAINT   Bloated (Severe bloating for about 2 weeks)    HPI   Deandra Colón is a 55 y.o. female.  Updated [facility] - Mammogram Ordered, 08/01/17 - Needs Aspirin/Lipid  Abdominal bloating for the past few weeks, with change in urine color but no burning urination, she had a hysterectomy done about 10 years ago and she is under the impression that her ovaries were removed, but care everywhere record reviewed and only mentioned  Hysterectomy.   No diarrhea no vomiting, occasional constipation, she takes dry prunes with improvement.  Chronic shoulder pain slowly improving with PT.    Review of Systems As per HPI, otherwise negative.    OBJECTIVE   /68 (Patient Site: Left Arm, Patient Position: Sitting, Cuff Size: Adult Regular)   Pulse 71   Temp 97.6  F (36.4  C)   Ht 5' 0.75\" (1.543 m)   Wt 143 lb 4 oz (65 kg)   SpO2 96%   BMI 27.29 kg/m    Physical Exam   Constitutional: She is oriented to person, place, and time. She appears well-developed and well-nourished.   HENT:   Head: Normocephalic and atraumatic.   Neck: Normal range of motion. Neck supple. No JVD present. No tracheal deviation present. No thyromegaly present.   Cardiovascular: Normal rate, regular rhythm, normal heart sounds and intact distal pulses. Exam reveals no gallop and no friction rub.   No murmur heard.  Pulmonary/Chest: Effort normal and breath sounds normal. No respiratory distress. She has no wheezes. She has " no rales.   Abdominal: She exhibits no distension. There is no abdominal tenderness. There is no rebound and no guarding.   Abdomen is mildly bloated   Musculoskeletal:         General: No tenderness or edema.   Lymphadenopathy:     She has no cervical adenopathy.   Neurological: She is alert and oriented to person, place, and time. Coordination normal.   Psychiatric: She has a normal mood and affect. Judgment and thought content normal.       PFSH     Family History   Problem Relation Age of Onset     Vision loss Mother      Glaucoma Mother      Kidney disease Father      Diabetes Father      Social History     Socioeconomic History     Marital status: Single     Spouse name: Not on file     Number of children: Not on file     Years of education: Not on file     Highest education level: Not on file   Occupational History     Not on file   Social Needs     Financial resource strain: Not on file     Food insecurity:     Worry: Not on file     Inability: Not on file     Transportation needs:     Medical: Not on file     Non-medical: Not on file   Tobacco Use     Smoking status: Never Smoker     Smokeless tobacco: Never Used   Substance and Sexual Activity     Alcohol use: Not on file     Drug use: Not on file     Sexual activity: Not on file   Lifestyle     Physical activity:     Days per week: Not on file     Minutes per session: Not on file     Stress: Not on file   Relationships     Social connections:     Talks on phone: Not on file     Gets together: Not on file     Attends Shinto service: Not on file     Active member of club or organization: Not on file     Attends meetings of clubs or organizations: Not on file     Relationship status: Not on file     Intimate partner violence:     Fear of current or ex partner: Not on file     Emotionally abused: Not on file     Physically abused: Not on file     Forced sexual activity: Not on file   Other Topics Concern     Not on file   Social History Narrative     Not  on file     Relevant history was reviewed with the patient today, unless noted in HPI, nothing is pertinent for this visit.  [facility]     Patient Active Problem List    Diagnosis Date Noted     Adhesive capsulitis of right shoulder 02/25/2019     Biceps tendinitis of right upper extremity 02/25/2019     Right sided temporal headache 12/04/2017     Chronic Post-traumatic Stress Disorder      Overview Note:     Created by Conversion         Major Depression, Single Episode      Overview Note:     Created by Conversion         Past Surgical History:   Procedure Laterality Date     BARTHOLIN GLAND CYST EXCISION  08/27/2009     PARTIAL HYSTERECTOMY  04/2010       RESULTS/CONSULTS (Lab/Rad)     Recent Results (from the past 168 hour(s))   Urinalysis-UC if Indicated   Result Value Ref Range    Color, UA Yellow Colorless, Yellow, Straw, Light Yellow    Clarity, UA Clear Clear    Glucose, UA Negative Negative    Bilirubin, UA Negative Negative    Ketones, UA Negative Negative    Specific Gravity, UA <=1.005 1.005 - 1.030    Blood, UA Negative Negative    pH, UA 5.5 5.0 - 8.0    Protein, UA Negative Negative mg/dL    Urobilinogen, UA 0.2 E.U./dL 0.2 E.U./dL, 1.0 E.U./dL    Nitrite, UA Negative Negative    Leukocytes, UA Trace (!) Negative    Bacteria, UA None Seen None Seen hpf    RBC, UA None Seen None Seen, 0-2 hpf    WBC, UA 0-5 None Seen, 0-5 hpf    Squam Epithel, UA 0-5 None Seen, 0-5 lpf     No results found.  MEDICATIONS     Current Outpatient Medications on File Prior to Visit   Medication Sig Dispense Refill     acetaminophen (TYLENOL EXTRA STRENGTH) 500 MG tablet Take 1-2 tablets by mouth every 4-6 hours as needed for pain.  Do not take more than 8 tablets in one day.       ibuprofen (ADVIL,MOTRIN) 600 MG tablet Take 1 tablet (600 mg total) by mouth every 6 (six) hours as needed for pain. 60 tablet 1     multivitamin therapeutic (THERAGRAN) tablet Take 1 tablet by mouth daily.       omega-3/dha/epa/fish oil (FISH  OIL-OMEGA-3 FATTY ACIDS) 300-1,000 mg capsule Take 2 g by mouth daily.       cyclobenzaprine (FLEXERIL) 5 MG tablet Take 1 tablet (5 mg total) by mouth at bedtime as needed for muscle spasms. (Patient not taking: Reported on 1/23/2020) 30 tablet 2     Lactobacillus rhamnosus GG (CULTURELLE) 15 billion cell CpSP Take 1 capsule by mouth 2 (two) times a day with meals.       No current facility-administered medications on file prior to visit.        HEALTH MAINTENANCE / SCREENING   PHQ-2 Total Score: 1 (10/10/2019 12:00 PM)  , PHQ-9 Total Score: 4 (10/10/2019 12:00 PM)  ,PILAR 7 Total Score: 4 (10/10/2019 12:00 PM)    Immunization History   Administered Date(s) Administered     Hep A, Adult IM (19yr & older) 03/29/2010     Hep A, historic 03/29/2010     Influenza, seasonal,quad inj 6-35 mos 09/29/2011     Influenza,seasonal, Inj IIV3 09/29/2011     Tdap 03/29/2010     Health Maintenance   Topic     DEPRESSION ACTION PLAN      HEPATITIS C SCREENING      PREVENTIVE CARE VISIT      HIV SCREENING      ZOSTER VACCINES (1 of 2)     LIPID      TDAP ADULT ONE TIME DOSE      INFLUENZA VACCINE RULE BASED (1)     ADVANCE CARE PLANNING      MAMMOGRAM      COLONOSCOPY      TD 18+ HE      PAP SMEAR        Chris Edwards MD  Family Medicine, [facility]     This note was dictated using a voice recognition software.  Any grammatical or context distortion are unintentional and inherent to the software.

## 2021-06-10 NOTE — PROGRESS NOTES
ASSESSMENT & PLAN:  1. Major Depression, Single Episode  Fluoxetine at current dose, signs symptoms of worsening depression discussed, she will seek prompt medical attention.  - FLUoxetine (PROZAC) 40 MG capsule; TAKE 1 CAPSULE BY MOUTH DAILY  Dispense: 30 capsule; Refill: 6  2. Left ankle pain  Continue to rest, ice, elevation, anti-inflammatory as needed, she was given an Aircast brace to wear most of the time.  - XR Ankle Left 3 or More VWS; Future  3. Insomnia  Sleep hygiene discussed, trazodone take as directed as needed.  - traZODone (DESYREL) 50 MG tablet; Take 1/2 or 1 tab qhs prn  Dispense: 30 tablet; Refill: 2    Orders Placed This Encounter   Procedures     XR Ankle Left 3 or More VWS     Standing Status:   Future     Number of Occurrences:   1     Standing Expiration Date:   5/6/2018     Order Specific Question:   Reason for Exam (Describe Symptoms):     Answer:   Left medial ankle pain     Order Specific Question:   Is the patient pregnant?     Answer:   No     Order Specific Question:   Can the procedure be changed per Radiologist protocol?     Answer:   Yes     Medications Discontinued During This Encounter   Medication Reason     FLUoxetine (PROZAC) 40 MG capsule Reorder       Return if symptoms worsen or fail to improve.    CHIEF COMPLAINT:  Chief Complaint   Patient presents with     Follow-up     Med check       HISTORY OF PRESENT ILLNESS:  Deandra is a 52 y.o. female presenting to the clinic today to follow up on depression. She is currently taking 40 mg of fluoxetine once daily. She states that she her sleeping is not good at this time. She mentions that she twists and turns a lot and her neck has been hurting. She has tried taking trazodone in the distant past, but it only made her drowsy, it did not help her to sleep. She has been on fluoxetine for the last year or so. She states that her very good friend was diagnosed with pulmonary fibrosis; this is something that is keeping her up at night.  "Her friend has two children and she is worried for her friend and what the future holds. She had a PHQ-9 score of 12 today.     Ankle Pain: She states that she has body aches, most notably in her left medial ankle. She mentions that she needs to wear a brace with her shoes. She has been taking ibuprofen, 800 mg twice daily. If she does not take the ibuprofen she is unable to land on her foot very well. She is unable to run because of the pain in her ankle. She states that walking is painful as well. She states that she does [employer] classes 3-4 times per week. She has noticed that the ankle will become worse throughout the week. She states that her ankle pain has been intermittent the last 5 years. Recently she has noticed that the pain is there more often. She has been wearing an ankle brace to try and eliminate some of the pain.      REVIEW OF SYSTEMS:   All other systems are negative.    PFSH:  Social: Her good friend was recently diagnosed with pulmonary fibrosis.     TOBACCO USE:  History   Smoking Status     Never Smoker   Smokeless Tobacco     Never Used       VITALS:  Vitals:    05/05/17 0749   BP: 96/68   Patient Site: Right Arm   Patient Position: Sitting   Cuff Size: Adult Regular   Pulse: 72   Weight: 126 lb (57.2 kg)   Height: 5' 0.75\" (1.543 m)     Wt Readings from Last 3 Encounters:   05/05/17 126 lb (57.2 kg)   02/22/17 126 lb (57.2 kg)   03/18/16 115 lb (52.2 kg)     Body mass index is 24 kg/(m^2).    PHYSICAL EXAM:  GENERAL:  Patient alert, in no acute distress.  MUSCULOSKELETAL: Left ankle; Mild tenderness in left medial malleolus, no tenderness, no erythema, no significant limitation of ROM.   PSYCHIATRIC:  Alert & oriented with normal mood and affect.  Good judgment and insight.    QUALITY MEASURES:  The following are part of a depression follow up plan for the patient:  management of mental health treatment, patient follow-up to return when and if necessary and taking history of mental health " assessments    ADDITIONAL HISTORY SUMMARIZED (2): None.  DECISION TO OBTAIN EXTRA INFORMATION (1): None.   RADIOLOGY TESTS (1): Ordered ankle XR today.  LABS (1): None.  MEDICINE TESTS (1): None.  INDEPENDENT REVIEW (2 each): Independent review of ankle XR as ordered above.     The visit lasted a total of 16 minutes face to face with the patient. Over 50% of the time was spent counseling and educating the patient about ankle pain.    IEmily, am scribing for and in the presence of, Dr. Edwards.    IDr. Edwards, personally performed the services described in this documentation, as scribed by Emily Hopper in my presence, and it is both accurate and complete.    Dragon dictation was used for this note.  Speech recognition errors are a possibility.    MEDICATIONS:  Current Outpatient Prescriptions   Medication Sig Dispense Refill     FLUoxetine (PROZAC) 40 MG capsule TAKE 1 CAPSULE BY MOUTH DAILY 30 capsule 6     ibuprofen (ADVIL,MOTRIN) 200 MG tablet Take 200 mg by mouth as needed for pain.       multivitamin therapeutic (THERAGRAN) tablet Take 1 tablet by mouth daily.       saliva stimulant (BIOTENE ORAL BALANCE) Gel gel by Mucous Membrane route as needed.       traZODone (DESYREL) 50 MG tablet Take 1/2 or 1 tab qhs prn 30 tablet 2     No current facility-administered medications for this visit.        Total data points: 3      Patient Instructions   Ankle XR today.   Wear ankle brace every time you are moving or exercising.     You can take trazodone, 25-50 mg at bedtime as needed.

## 2021-06-14 NOTE — PROGRESS NOTES
"OFFICE VISIT - FAMILY MEDICINE     ASSESSMENT AND PLAN     1. Right sided temporal headache  Erythrocyte Sedimentation Rate    Comprehensive Metabolic Panel    HM1(CBC and Differential)    MR Brain COW Carotid With Contrast    HM1 (CBC with Diff)    Herpes Simplex/V. Zoster by PCR   2. Hot flashes     Right-sided headaches, differential diagnosis discussed with the patient, included brain aneurysm, migraine headaches etc. we are getting a brain MRI, further recommendation will be based on that.  She also has insomnia, hot flashes, she has not responded well to trazodone, we discussed about other alternative treatment beside hormone replacement therapy that she does want to do, she elected to give a trial to Neurontin 100 mg at night, possible side effect were discussed with her included sleepiness drowsiness etc.  CHIEF COMPLAINT   Headache (\"PRESSURE HEADACHES, INTERMITTNT\" X 2 MTHS - LAST APPT 05/05/17); Results (COLONOSCOPY, [facility], 04/05/2013); Neck Pain (F/U); and Insomnia    HPI   Deandra Colón is a 52 y.o. female.  Updated [facility] - Mammogram Ordered, 08/01  She has been experiencing right temporal headaches for the past couple of weeks, described the headaches as a pressure, localizing the right temporal area, mild to moderate in intensity's, not totally relieved with ibuprofen or Advil available over-the-counter.  She also tried  to eliminate starch from her diet but did not seems to help.  Denies any phonophobia photophobia.  She is unable to sleep because of the pain.  She also mention history of neck pain, she stating related to not sleeping at night because of her hot flashes.  No try any specific treatment.  She is currently on fluoxetine for depression and posttraumatic stress disorder.  She denies being suicidal.  She has tried trazodone in the past for sleeping, but the medication made her very sleepy even during the day.      Review of Systems As per HPI, otherwise negative.    OBJECTIVE   BP 98/60 " "(Patient Site: Right Arm)  Pulse 64  Temp 98.2  F (36.8  C) (Oral)   Resp 13  Ht 5' 0.75\" (1.543 m)  Wt 124 lb (56.2 kg)  BMI 23.62 kg/m2  Physical Exam   Constitutional: She is oriented to person, place, and time. She appears well-developed and well-nourished.   HENT:   Head: Normocephalic and atraumatic.   Neck: Normal range of motion. Neck supple. No JVD present. No tracheal deviation present. No thyromegaly present.   Cardiovascular: Normal rate, regular rhythm, normal heart sounds and intact distal pulses.  Exam reveals no gallop and no friction rub.    No murmur heard.  Pulmonary/Chest: Effort normal and breath sounds normal. No respiratory distress. She has no wheezes. She has no rales.   Musculoskeletal: She exhibits no edema or tenderness.   Lymphadenopathy:     She has no cervical adenopathy.   Neurological: She is alert and oriented to person, place, and time. Coordination normal.   Psychiatric: She has a normal mood and affect. Judgment and thought content normal.       [facility]     Family History   Problem Relation Age of Onset     Vision loss Mother      Glaucoma Mother      Kidney disease Father      Diabetes Father      Social History     Social History     Marital status: Single     Spouse name: N/A     Number of children: N/A     Years of education: N/A     Occupational History     Not on file.     Social History Main Topics     Smoking status: Never Smoker     Smokeless tobacco: Never Used     Alcohol use Not on file     Drug use: Not on file     Sexual activity: Not on file     Other Topics Concern     Not on file     Social History Narrative     Relevant history was reviewed with the patient today, unless noted in HPI, nothing is pertinent for this visit.  [facility]     Patient Active Problem List    Diagnosis Date Noted     Chronic Post-traumatic Stress Disorder      Overview Note:     Created by Conversion         Major Depression, Single Episode      Overview Note:     Created by Conversion     "     Past Surgical History:   Procedure Laterality Date     BARTHOLIN GLAND CYST EXCISION  08/27/2009     PARTIAL HYSTERECTOMY  04/2010       RESULTS/CONSULTS (Lab/Rad)     Recent Results (from the past 168 hour(s))   HM1 (CBC with Diff)   Result Value Ref Range    WBC 4.1 4.0 - 11.0 thou/uL    RBC 4.36 3.80 - 5.40 mill/uL    Hemoglobin 12.2 12.0 - 16.0 g/dL    Hematocrit 36.5 35.0 - 47.0 %    MCV 84 80 - 100 fL    MCH 27.8 27.0 - 34.0 pg    MCHC 33.3 32.0 - 36.0 g/dL    RDW 12.0 11.0 - 14.5 %    Platelets 215 140 - 440 thou/uL    MPV 8.3 7.0 - 10.0 fL    Neutrophils % 59 50 - 70 %    Lymphocytes % 32 20 - 40 %    Monocytes % 5 2 - 10 %    Eosinophils % 3 0 - 6 %    Basophils % 1 0 - 2 %    Neutrophils Absolute 2.5 2.0 - 7.7 thou/uL    Lymphocytes Absolute 1.3 0.8 - 4.4 thou/uL    Monocytes Absolute 0.2 0.0 - 0.9 thou/uL    Eosinophils Absolute 0.1 0.0 - 0.4 thou/uL    Basophils Absolute 0.0 0.0 - 0.2 thou/uL     No results found.  MEDICATIONS     Current Outpatient Prescriptions on File Prior to Visit   Medication Sig Dispense Refill     FLUoxetine (PROZAC) 40 MG capsule TAKE 1 CAPSULE BY MOUTH DAILY 30 capsule 6     ibuprofen (ADVIL,MOTRIN) 200 MG tablet Take 200 mg by mouth as needed for pain.       multivitamin therapeutic (THERAGRAN) tablet Take 1 tablet by mouth daily.       saliva stimulant (BIOTENE ORAL BALANCE) Gel gel by Mucous Membrane route as needed.       [DISCONTINUED] traZODone (DESYREL) 50 MG tablet Take 1/2 or 1 tab qhs prn 30 tablet 2     No current facility-administered medications on file prior to visit.        HEALTH MAINTENANCE / SCREENING   PHQ-2 Total Score: 0 (12/4/2017  1:00 PM), PHQ-9 Total Score: 3 (12/4/2017  1:00 PM),PILAR-7 Total: 5 (12/4/2017  1:00 PM)  Immunization History   Administered Date(s) Administered     Hep A, Adult IM (19yr & older) 03/29/2010     Hep A, historic 03/29/2010     Influenza, seasonal,quad inj 6-35 mos 09/29/2011     Influenza,seasonal, Inj IIV3 09/29/2011      Tdap 03/29/2010     Health Maintenance   Topic     MAMMOGRAM      INFLUENZA VACCINE RULE BASED (1)     DEPRESSION FOLLOW UP      TDAP ADULT ONE TIME DOSE      ADVANCE DIRECTIVES DISCUSSED WITH PATIENT      COLONOSCOPY      TD 18+ HE      PAP SMEAR        Chris Edwards MD  Family Medicine, [facility]     This note was dictated using a voice recognition software.  Any grammatical or context distortion are unintentional and inherent to the software.

## 2021-06-17 NOTE — PATIENT INSTRUCTIONS - HE
Patient Instructions by Chris Edwards MD at 2/18/2019  2:40 PM     Author: Chris Edwards MD Service: -- Author Type: Physician    Filed: 2/19/2019  9:24 AM Encounter Date: 2/18/2019 Status: Signed    : Chris Edwards MD (Physician)       Patient Education     Shoulder Impingement Syndrome  The rotator cuff is a group of muscles and tendons that surround the shoulder joint. These muscles and tendons hold the arm in its joint. They help the shoulder move. The rotator cuff muscles and tendons can become irritated from repeated rubbing against the shoulder bone. This is called shoulder impingement syndrome or rotator cuff tendonitis.     If your case is mild, you may only need to rest the shoulder and then do certain exercises to strengthen the muscles. You can also take anti-inflammatory medicines. Steroid injections into the shoulder can ease inflammation. But you can have only a limited number of these. If the condition gets worse, your shoulder muscles may become thin and weak. This can lead to a rotator cuff tear.  Symptoms of shoulder impingement syndrome may include:    Shoulder pain that gets worse when you raise your arm overhead    Weakness of the shoulder muscles when you use your arm overhead    Popping and clicking when you move your shoulder    Shoulder pain that wakes you up at night, especially when you sleep on the affected shoulder    Sudden pain in your shoulder when you lift or reach  Home care  Follow these tips to take care of yourself at home:    Avoid activities that make your pain worse. These include raising your arms overhead, repeating the same motion over and over, or lifting heavy objects.    Dont hold your arm in one position for a long time. Keep it moving.    Put an ice pack on the sore area for 20 minutes every 1 to 2 hours for the first day. You can make an ice pack by putting ice cubes in a plastic bag. Wrap the bag in a towel before putting it  on your shoulder. A frozen bag of peas or something similar can also be used as an ice pack. Use the ice packs 3 to 4 times a day for the next 2 days. Continue using the ice to relieve of pain and swelling as needed.    You may take acetaminophen or ibuprofen to control pain, unless another medicine was prescribed. If prednisone was prescribed, dont take anti-inflammatory medicines. If you have chronic liver or kidney disease or ever had a stomach ulcer or gastrointestinal bleeding, talk with your doctor before using these medicines.    After your symptoms ease, you may get physical therapy or start a home exercise program. This can strengthen your shoulder muscles and help your range of motion. Talk with your doctor about what is best for your condition.  Follow-up care  Follow up with your healthcare provider, or as advised.  When to seek medical advice  Call your healthcare provider right away if any of these occur:    Shoulder pain that gets worse and wakes you up at night    Your shoulder or arm swells    Numbness, tingling, or pain that travels down the arm to the hand    Loss of shoulder strength    Fever or chills  Date Last Reviewed: 8/1/2016 2000-2017 [employer]. [address] [place] [address]. All rights reserved. This information is not intended as a substitute for professional medical care. Always follow your healthcare professional's instructions.

## 2021-06-17 NOTE — PATIENT INSTRUCTIONS - HE
"Patient Instructions by Chris Edwards MD at 1/23/2020  1:20 PM     Author: Chris Edwards MD Service: -- Author Type: Physician    Filed: 1/23/2020  2:04 PM Encounter Date: 1/23/2020 Status: Addendum    : Chris Edwards MD (Physician)    Related Notes: Original Note by Chris Edwards MD (Physician) filed at 1/23/2020  2:03 PM       Patient Education     Gluten-Free Diet for Celiac Disease  Celiac disease means that you are sensitive to a protein called gluten. Gluten is found in certain grains. When you eat gluten, your immune system causes harm to your small intestines. The treatment for celiac disease is to stay away from foods and products that contain gluten. You will need to do this for the rest of your life. Resist the temptation to cheat, because even a small amount of gluten can cause symptoms to return. And it can harm your body. This sheet gives you the basics about a gluten-free diet. If you need help, a registered dietitian can teach you what foods and other products have gluten and how to keep away from them.  Always read labels!  Many foods may contain gluten, even if you think they don't. Get into the habit of reading ingredient labels before you eat.   Choosing foods  The most common source of gluten is wheat flour (this includes \"white\" flour). Wheat flour is used to make many baked goods, including breads, pastas, cereals, pastries, and pizza dough. Gluten is also found in many foods that you might not think would have it. You will need to read food labels to look for gluten in everything you eat. But your diet does not need to be boring. Many foods are naturally gluten-free. And many foods commonly made with wheat flour now come in gluten-free forms.   Foods to stay away from Foods you can eat   Bread, cereals, pasta, pastries, couscous, or pizza dough made with wheat flour (including white flour, farina, farro, emmer, durum, ivan, and semolina) " "Bread, cereals, pasta, pastries, or pizza dough made with rice flour, almond flour, beans, potatoes, and other gluten-free substitutes   Foods containing rye, barley (including malt), spelt, kamut, triticale, mcmillan's yeast, and bulgur Foods containing corn, cassava, rice, amaranth, buckwheat, millet, quinoa, arrowroot, teff, soy, and tapioca   Processed meats Fresh meats and seafood (beef, chicken, turkey, lamb, pork, fish, shellfish), beans, and tofu   Some dairy products with additives Many plain dairy products   Many sauces, gravies, dressings, and condiments, including traditional soy sauce Vinegar, oils, and gluten-free substitutes   Some granola bars and energy bars Granola bars and energy bars labeled \"gluten-free\"   Some beers and spirits Wine, and gluten-free beers and distilled spirits   Some soups Gluten-free soups   Fruits and vegetables that are fried or breaded Fresh fruits and vegetables   Many packaged foods Packaged foods labeled \"gluten-free\"   Oats (check with your healthcare provider) Gluten-free oats   Communion wafers Gluten-free communion wafers   If you are exposed to gluten by accident  Staying gluten-free means always being aware. Even if you are very careful, mistakes can happen. The food you eat can't come into contact with gluten. Your meals must be made with utensils that have not touched foods that contain gluten. Shared knives, cutting boards, toasters, and storage containers are risks for gluten exposure. Shared condiments may have crumbs that contain gluten. At restaurants, parties, and other places where you eat food prepared by others, ask how the food was made. Gluten can also be found in some non-food items. Some medicines contain gluten. So do some vitamin supplements. Ask your pharmacist before taking a medicine or supplement. Also, some shampoos, lotions, toothpastes, makeup, lipsticks as well as lip gloss and lip balm, glues, soaps, and other products contain gluten. It can " be possible to ingest some gluten when using these projects. This is called cross-contamination. For example, this can happen if you use a lotion that has gluten and then touch food you eat. Children's monique and similar products have gluten. Any adult or child with celiac disease should wash their hands after handling these.  Coping with gluten-free living  Living gluten-free can be hard. But it can be done. While there are many gluten-free foods now that you can buy, it is still a big change for many people. You may be upset that you can't eat your favorite foods, eat freely at restaurants, parties, or over the holidays. Household members may also be upset by the strict controls over food. If you face problems like these, think about joining a celiac disease support group. Support groups offer tips on how to make a gluten-free lifestyle easier on you and the people you live with. You can find ways to involve the people in your household. There are many ways to make gluten-free group meals. See More Resources below for help in finding a group.  Bring safe foods that you enjoy to parties and school or work events. This can help you avoid the urge to grab something you shouldnt eat.   Following up with your healthcare provider  You should see your healthcare provider at least once a year for a checkup. A simple blood test can show if your celiac disease is under control. If you are having symptoms, your healthcare provider can help you find sources of gluten you may have missed.  More resources  To learn more about managing celiac disease, go to:    Celiac Disease Foundation: www.celiac.org    Academy of Nutrition and Dietetics: www.eatright.org    National Digestive Diseases Information Clearinghouse: www.digestive.niddk.nih.gov  Date Last Reviewed: 6/1/2017 2000-2019 [employer]. [address] [place] [address], [place] [address]. All rights reserved. This information is not intended as a substitute for  professional medical care. Always follow your healthcare professional's instructions.

## 2021-06-18 ENCOUNTER — COMMUNICATION - HEALTHEAST (OUTPATIENT)
Dept: FAMILY MEDICINE | Facility: CLINIC | Age: 56
End: 2021-06-18

## 2021-06-18 DIAGNOSIS — F32.9 MAJOR DEPRESSIVE DISORDER, SINGLE EPISODE: ICD-10-CM

## 2021-06-19 NOTE — LETTER
Letter by Chris Edwards MD at      Author: Chris Edwards MD Service: -- Author Type: --    Filed:  Encounter Date: 4/17/2019 Status: (Other)         April 17, 2019     Patient: Deandra Colón   YOB: 1965   Date of Visit: 4/17/2019       To Whom It May Concern:    It is my medical opinion that Deandra Colón  missed work from 4/11 until 4/17 for medical reason.  Ok to return to work on 4/18.    If you have any questions or concerns, please don't hesitate to call.    Sincerely,        Electronically signed by Chris Edwards MD

## 2021-06-24 NOTE — PROGRESS NOTES
Joint Aspiration/Injection  Date/Time: 2/25/2019 5:46 PM  Performed by: Chris Edwards MD  Authorized by: Chris Edwards MD   Indications: pain   Body area: shoulder  Joint: right shoulder  Local anesthesia used: yes    Anesthesia:  Local anesthesia used: yes  Local Anesthetic: topical anesthetic  Needle size: 22 G        Mixture of 2 mL of 0.5% Marcaine and 1 mL of 40 mg Kenalog injected in a sterile fashion in the anterior biceps groove area; the patient also had a second injection with a different syringe with a mixture of 0.5% Marcaine the 3 mL and 40 mg Kenalog total of 1 mL at the glenohumeral joint posteriorly.  She tolerated the procedure well.  No immediate complication was able to have some slight improvement of her range of motion after the injection.

## 2021-06-26 NOTE — TELEPHONE ENCOUNTER
Left a message in patient voicemail to call back and leave the message or  to make an appointment to be seen.

## 2021-06-26 NOTE — TELEPHONE ENCOUNTER
Reason for Call:  Other      Detailed comments: pt is calling to ask if dr plascencia can please call her back, she didn't want to share with me why,,, she was concerned that he is not at [facility] but I did assure her she can be seen here. I also told her dr plascencia is off today and most likely will return her call on Monday,    Phone Number Patient can be reached at: Home number on file [phone] (home)    Best Time: any    Can we leave a detailed message on this number?: No    Call taken on 6/18/2021 at 10:55 AM by Valeria Connolly

## 2021-06-26 NOTE — TELEPHONE ENCOUNTER
Patient called back to request if provider would send to RX for this medication, FLUoxetine (PROZAC) 20 MG capsule to her pharmacy on file. Please send RX to pharmacy on file.

## 2021-06-27 NOTE — PROGRESS NOTES
Progress Notes by Chris Edwards MD at 2/18/2019  2:40 PM     Author: Chris Edwards MD Service: -- Author Type: Physician    Filed: 2/19/2019  9:25 AM Encounter Date: 2/18/2019 Status: Signed    : Chris Edwards MD (Physician)       OFFICE VISIT - FAMILY MEDICINE     ASSESSMENT AND PLAN     1. Impingement syndrome of shoulder region, right  MR Shoulder Without Contrast Right   2. Visit for screening mammogram  Mammo Screening Bilateral   3. Acute pain of right shoulder  MR Shoulder Without Contrast Right   4. Major depressive disorder, single episode  FLUoxetine (PROZAC) 20 MG capsule   Right shoulder pain, differential diagnosis discussed including impingement syndrome, SLAP etc., we will schedule an MRI to further characterize, based on the result with determine if patient need intensive treatment like physical therapy versus surgical management at the orthopedist office.  Depression, overall controlled with fluoxetine 40 mg, but patient complaining of insomnia related to the medication, dose was decreased to 20 mg daily, possible side effect discussed.  CHIEF COMPLAINT   Shoulder Pain (rt.- 6 months non-injury, worse at night. Taking ibuprofen,tylenol for pain) and Medication Dose Change (anti-depressant)    HPI   Deandra Colón is a 54 y.o. female.  Updated [facility] - Mammogram Ordered, 08/01/17 - Needs Aspirin/Lipid    Right shoulder pain started about 6 months ago, no specific injury, pain is described as throbbing, sometimes sharp especially when doing some movement, denies any radiation of the pain to the abdominal upper back region.  Has been doing some stretching exercise with no improvement, for the past month or so her pain seems to be getting worse, sharp sensation localized on the top and on the back of the shoulder area, with limited range of motion.  History of frozen left shoulder treated with steroid injection.  History of depression, currently taking fluoxetine  40 mg daily, but complaining of insomnia related to the medication, would like the dose to be decreased.  Denies any suicidal ideation.    Review of Systems As per HPI, otherwise negative.    OBJECTIVE   /80 (Patient Site: Left Arm, Patient Position: Sitting, Cuff Size: Adult Regular)   Pulse 60   Wt 136 lb (61.7 kg)   SpO2 99%   BMI 25.91 kg/m    Physical Exam   Constitutional: She is oriented to person, place, and time. She appears well-developed and well-nourished.   HENT:   Head: Normocephalic and atraumatic.   Neck: Normal range of motion. Neck supple. No JVD present. No tracheal deviation present. No thyromegaly present.   Cardiovascular: Normal rate, regular rhythm, normal heart sounds and intact distal pulses. Exam reveals no gallop and no friction rub.   No murmur heard.  Pulmonary/Chest: Effort normal and breath sounds normal. No respiratory distress. She has no wheezes. She has no rales.   Musculoskeletal: She exhibits no edema.        Right shoulder: She exhibits decreased range of motion and tenderness. She exhibits no swelling, no effusion and no crepitus.        Arms:  Positive Engle test.   Lymphadenopathy:     She has no cervical adenopathy.   Neurological: She is alert and oriented to person, place, and time. Coordination normal.   Psychiatric: She has a normal mood and affect. Judgment and thought content normal.       [facility]     Family History   Problem Relation Age of Onset   ? Vision loss Mother    ? Glaucoma Mother    ? Kidney disease Father    ? Diabetes Father      Social History     Socioeconomic History   ? Marital status: Single     Spouse name: Not on file   ? Number of children: Not on file   ? Years of education: Not on file   ? Highest education level: Not on file   Social Needs   ? Financial resource strain: Not on file   ? Food insecurity - worry: Not on file   ? Food insecurity - inability: Not on file   ? Transportation needs - medical: Not on file   ? Transportation  needs - non-medical: Not on file   Occupational History   ? Not on file   Tobacco Use   ? Smoking status: Never Smoker   ? Smokeless tobacco: Never Used   Substance and Sexual Activity   ? Alcohol use: Not on file   ? Drug use: Not on file   ? Sexual activity: Not on file   Other Topics Concern   ? Not on file   Social History Narrative   ? Not on file     Relevant history was reviewed with the patient today, unless noted in HPI, nothing is pertinent for this visit.  [facility]     Patient Active Problem List    Diagnosis Date Noted   ? Right sided temporal headache 12/04/2017   ? Chronic Post-traumatic Stress Disorder      Overview Note:     Created by Conversion       ? Major Depression, Single Episode      Overview Note:     Created by Conversion         Past Surgical History:   Procedure Laterality Date   ? BARTHOLIN GLAND CYST EXCISION  08/27/2009   ? PARTIAL HYSTERECTOMY  04/2010       RESULTS/CONSULTS (Lab/Rad)   No results found for this or any previous visit (from the past 168 hour(s)).  No results found.  MEDICATIONS     Current Outpatient Medications on File Prior to Visit   Medication Sig Dispense Refill   ? acetaminophen (TYLENOL EXTRA STRENGTH) 500 MG tablet Take 1-2 tablets by mouth every 4-6 hours as needed for pain.  Do not take more than 8 tablets in one day.     ? ibuprofen (ADVIL,MOTRIN) 200 MG tablet Take 200 mg by mouth as needed for pain.     ? multivitamin therapeutic (THERAGRAN) tablet Take 1 tablet by mouth daily.     ? omega-3/dha/epa/fish oil (FISH OIL-OMEGA-3 FATTY ACIDS) 300-1,000 mg capsule Take 2 g by mouth daily.       No current facility-administered medications on file prior to visit.        HEALTH MAINTENANCE / SCREENING   PHQ-2 Total Score: 0 (2/18/2019  3:00 PM)  , PHQ-9 Total Score: 4 (2/18/2019  3:00 PM)  ,PILAR 7 Total Score: 2 (2/18/2019  3:00 PM)    Immunization History   Administered Date(s) Administered   ? Hep A, Adult IM (19yr & older) 03/29/2010   ? Hep A, historic 03/29/2010    ? Influenza, seasonal,quad inj 6-35 mos 09/29/2011   ? Influenza,seasonal, Inj IIV3 09/29/2011   ? Tdap 03/29/2010     Health Maintenance   Topic   ? MAMMOGRAM    ? INFLUENZA VACCINE RULE BASED (1)   ? DEPRESSION FOLLOW UP    ? TDAP ADULT ONE TIME DOSE    ? ADVANCE DIRECTIVES DISCUSSED WITH PATIENT    ? COLONOSCOPY    ? TD 18+ HE    ? PAP SMEAR        Chris Edwards MD  Family Medicine, [facility]     This note was dictated using a voice recognition software.  Any grammatical or context distortion are unintentional and inherent to the software.

## 2021-06-27 NOTE — PROGRESS NOTES
"Progress Notes by Chris Edwards MD at 2/25/2019  1:20 PM     Author: Chris Edwards MD Service: -- Author Type: Physician    Filed: 2/25/2019  5:51 PM Encounter Date: 2/25/2019 Status: Signed    : Chris Edwards MD (Physician)       OFFICE VISIT - FAMILY MEDICINE     ASSESSMENT AND PLAN     1. Adhesive capsulitis of right shoulder  triamcinolone acetonide 40 mg/mL injection 40 mg (KENALOG-40)    DISCONTINUED: triamcinolone acetonide 40 mg/mL injection 40 mg (KENALOG-40)   2. Biceps tendinitis of right upper extremity  triamcinolone acetonide 40 mg/mL injection 40 mg (KENALOG-40)   3. Current mild episode of major depressive disorder without prior episode (H)     Visit capsulitis, steroid injection was given today, see procedure note home physical therapy discussed.  Mild depression with insomnia, Prozac was decreased to 20 mg daily, has not seen improvement of her sleep, has tried trazodone and melatonin in the past, discussed about sleep hygiene and possibly referral to a sleep clinic.    CHIEF COMPLAINT   Shoulder Pain (Rt.; pain-would like to get injection)    HPI   Deandra Colón is a 54 y.o. female.  Updated [facility] - Mammogram Ordered, 08/01/17 - Needs Aspirin/Lipid  Patient with right shoulder adhesive capsulitis and biceps tendinitis symptoms, confirmed by MRI, she still having some limited range of motion of the right upper extremity, treatment options were discussed with her included PT, she like to get a steroid injection, was successful in her left shoulder a few years ago.  Having some issue with insomnia asking do I  have a \"sleep disorder\"has tried trazodone and melatonin in the past, but they will make her feel very sleepy in the morning.  Review of Systems As per HPI, otherwise negative.    OBJECTIVE   /70 (Patient Site: Right Arm, Patient Position: Sitting, Cuff Size: Adult Regular)   Pulse 60   Wt 134 lb 12 oz (61.1 kg)   SpO2 98%   BMI 25.67 kg/m  "   Physical Exam   Constitutional: She is oriented to person, place, and time. She appears well-developed and well-nourished.   HENT:   Head: Normocephalic and atraumatic.   Neck: Normal range of motion. Neck supple. No JVD present. No tracheal deviation present. No thyromegaly present.   Cardiovascular: Normal rate, regular rhythm, normal heart sounds and intact distal pulses. Exam reveals no gallop and no friction rub.   No murmur heard.  Pulmonary/Chest: Effort normal and breath sounds normal. No respiratory distress. She has no wheezes. She has no rales.   Musculoskeletal: She exhibits no edema.        Right shoulder: She exhibits decreased range of motion and tenderness. She exhibits no swelling, no effusion and no crepitus.        Arms:  Positive Engle test.   Lymphadenopathy:     She has no cervical adenopathy.   Neurological: She is alert and oriented to person, place, and time. Coordination normal.   Psychiatric: She has a normal mood and affect. Judgment and thought content normal.       [facility]     Family History   Problem Relation Age of Onset   ? Vision loss Mother    ? Glaucoma Mother    ? Kidney disease Father    ? Diabetes Father      Social History     Socioeconomic History   ? Marital status: Single     Spouse name: Not on file   ? Number of children: Not on file   ? Years of education: Not on file   ? Highest education level: Not on file   Occupational History   ? Not on file   Social Needs   ? Financial resource strain: Not on file   ? Food insecurity:     Worry: Not on file     Inability: Not on file   ? Transportation needs:     Medical: Not on file     Non-medical: Not on file   Tobacco Use   ? Smoking status: Never Smoker   ? Smokeless tobacco: Never Used   Substance and Sexual Activity   ? Alcohol use: Not on file   ? Drug use: Not on file   ? Sexual activity: Not on file   Lifestyle   ? Physical activity:     Days per week: Not on file     Minutes per session: Not on file   ? Stress: Not on  file   Relationships   ? Social connections:     Talks on phone: Not on file     Gets together: Not on file     Attends Jewish service: Not on file     Active member of club or organization: Not on file     Attends meetings of clubs or organizations: Not on file     Relationship status: Not on file   ? Intimate partner violence:     Fear of current or ex partner: Not on file     Emotionally abused: Not on file     Physically abused: Not on file     Forced sexual activity: Not on file   Other Topics Concern   ? Not on file   Social History Narrative   ? Not on file     Relevant history was reviewed with the patient today, unless noted in HPI, nothing is pertinent for this visit.  [facility]     Patient Active Problem List    Diagnosis Date Noted   ? Adhesive capsulitis of right shoulder 02/25/2019   ? Biceps tendinitis of right upper extremity 02/25/2019   ? Right sided temporal headache 12/04/2017   ? Chronic Post-traumatic Stress Disorder      Overview Note:     Created by Conversion       ? Major Depression, Single Episode      Overview Note:     Created by Conversion         Past Surgical History:   Procedure Laterality Date   ? BARTHOLIN GLAND CYST EXCISION  08/27/2009   ? PARTIAL HYSTERECTOMY  04/2010       RESULTS/CONSULTS (Lab/Rad)   No results found for this or any previous visit (from the past 168 hour(s)).  Mr Shoulder Without Contrast Right    Result Date: 2/21/2019  EXAM DATE:         02/21/2019 [facility] RADIOLOGY EXAM: MRI SHOULDER RIGHT W/O CONTRAST LOCATION: [facility] RADIOLOGY [facility]NT[facility] DATE/TIME: 2/21/2019 7:30 AM INDICATION: Right shoulder pain, impingement syndrome. COMPARISON: None. TECHNIQUE: Unenhanced. INTERPRETATION: ROTATOR CUFF: No rotator cuff tear. Supraspinatus, infraspinatus, and subscapularis tendons are intact. Teres minor is normal. Muscle bulk is preserved. No fatty infiltration. CORACOACROMIAL ARCH: Type II acromion. No inferior spurring. There is a tiny subcortical cyst along the  lateral margin of the acromion. No subluxation of the humeral head. Small amount of fluid and reticulation within the subacromial-subdeltoid bursa. ACROMIOCLAVICULAR JOINT: Mild to moderate degenerative changes involving the acromioclavicular joint. BICEPS TENDON: Long head of the biceps tendon is intact and reaches the supraglenoid tubercle. There is fluid in the tendon sheath. No subluxation. GLENOHUMERAL JOINT AND LABRUM: No evidence of a labral tear. There is pericapsular thickening and mild pericapsular edema involving the axillary pouch. Mildly thickened coracohumeral ligament within the rotator interval. Intact glenohumeral articular cartilage. No joint effusion. BONES AND SOFT TISSUES: No significant marrow signal abnormality. No Hill-Sachs lesion. CONCLUSION: 1.  Findings compatible with adhesive glenohumeral capsulitis in the appropriate clinical setting. 2.  Mild bicipital tenosynovitis. Mild subacromial-subdeltoid bursitis. 3.  No labral or rotator cuff tear.      MEDICATIONS     Current Outpatient Medications on File Prior to Visit   Medication Sig Dispense Refill   ? acetaminophen (TYLENOL EXTRA STRENGTH) 500 MG tablet Take 1-2 tablets by mouth every 4-6 hours as needed for pain.  Do not take more than 8 tablets in one day.     ? FLUoxetine (PROZAC) 20 MG capsule Take 1 capsule (20 mg total) by mouth daily. 30 capsule 2   ? ibuprofen (ADVIL,MOTRIN) 200 MG tablet Take 200 mg by mouth as needed for pain.     ? multivitamin therapeutic (THERAGRAN) tablet Take 1 tablet by mouth daily.     ? omega-3/dha/epa/fish oil (FISH OIL-OMEGA-3 FATTY ACIDS) 300-1,000 mg capsule Take 2 g by mouth daily.       No current facility-administered medications on file prior to visit.        HEALTH MAINTENANCE / SCREENING   PHQ-2 Total Score: 0 (2/18/2019  3:00 PM)  , PHQ-9 Total Score: 4 (2/18/2019  3:00 PM)  ,PILAR 7 Total Score: 2 (2/18/2019  3:00 PM)    Immunization History   Administered Date(s) Administered   ? Hep A,  Adult IM (19yr & older) 03/29/2010   ? Hep A, historic 03/29/2010   ? Influenza, seasonal,quad inj 6-35 mos 09/29/2011   ? Influenza,seasonal, Inj IIV3 09/29/2011   ? Tdap 03/29/2010     Health Maintenance   Topic   ? INFLUENZA VACCINE RULE BASED (1)   ? MAMMOGRAM    ? DEPRESSION FOLLOW UP    ? TDAP ADULT ONE TIME DOSE    ? ADVANCE DIRECTIVES DISCUSSED WITH PATIENT    ? COLONOSCOPY    ? TD 18+ HE    ? PAP SMEAR        Chris Edwards MD  Family Medicine, [facility]     This note was dictated using a voice recognition software.  Any grammatical or context distortion are unintentional and inherent to the software.

## 2021-06-27 NOTE — PROGRESS NOTES
Progress Notes by Chris Edwards MD at 4/17/2019  9:00 AM     Author: Chris Edwards MD Service: -- Author Type: Physician    Filed: 4/18/2019  4:37 PM Encounter Date: 4/17/2019 Status: Signed    : Chris Edwards MD (Physician)       OFFICE VISIT - FAMILY MEDICINE     ASSESSMENT AND PLAN     1. Acute right-sided low back pain without sciatica  Ambulatory referral to PT/OT   2. Adhesive capsulitis of right shoulder  Ambulatory referral to PT/OT   Acute musculoskeletal back pain, slowly improving activity modification and painkiller, we discussed about continued symptomatic management, taper of pain killer, and started physical therapy program.  Adhesive capsulitis status post her injection a few weeks ago, slowly improving, but will also benefit by starting a physical therapy program.      CHIEF COMPLAINT   Follow-up (test results); Fatigue (sleeping since 04/11/19); and Anorexia (forces self to eat, no taste for food)    HPI   Deandra Colón is a 54 y.o. female.  Updated [facility] - Mammogram Ordered, 08/01/17 - Needs Aspirin/Lipid    Acute new onset of sharp right-sided back pain, CT scan done stat last week did not show any kidney stone, no bowel perforation, pain was thought to be musculoskeletal in nature, oxycodone as prescribed,it seems to help controlling her pain, but is making her feeling dizzy and nauseated;  She still having shoulder pain from adhesive capsulitis, status post steroid injection a few weeks ago, pain is also controlled with oxycodone, but interested on starting physical therapy.      Review of Systems As per HPI, otherwise negative.    OBJECTIVE   BP (!) 79/60 (Patient Site: Right Arm, Patient Position: Sitting, Cuff Size: Adult Regular)   Pulse 85   Wt 135 lb 12 oz (61.6 kg)   SpO2 97%   BMI 25.86 kg/m    Physical Exam   Constitutional: She is oriented to person, place, and time. She appears well-developed and well-nourished.   HENT:   Head:  Normocephalic and atraumatic.   Neck: Normal range of motion. Neck supple. No JVD present. No tracheal deviation present. No thyromegaly present.   Cardiovascular: Normal rate, regular rhythm, normal heart sounds and intact distal pulses. Exam reveals no gallop and no friction rub.   No murmur heard.  Pulmonary/Chest: Effort normal and breath sounds normal. No respiratory distress. She has no wheezes. She has no rales.   Musculoskeletal: She exhibits no edema or tenderness.        Arms:  Lymphadenopathy:     She has no cervical adenopathy.   Neurological: She is alert and oriented to person, place, and time. Coordination normal.   Psychiatric: She has a normal mood and affect. Judgment and thought content normal.       PFSH     Family History   Problem Relation Age of Onset   ? Vision loss Mother    ? Glaucoma Mother    ? Kidney disease Father    ? Diabetes Father      Social History     Socioeconomic History   ? Marital status: Single     Spouse name: Not on file   ? Number of children: Not on file   ? Years of education: Not on file   ? Highest education level: Not on file   Occupational History   ? Not on file   Social Needs   ? Financial resource strain: Not on file   ? Food insecurity:     Worry: Not on file     Inability: Not on file   ? Transportation needs:     Medical: Not on file     Non-medical: Not on file   Tobacco Use   ? Smoking status: Never Smoker   ? Smokeless tobacco: Never Used   Substance and Sexual Activity   ? Alcohol use: Not on file   ? Drug use: Not on file   ? Sexual activity: Not on file   Lifestyle   ? Physical activity:     Days per week: Not on file     Minutes per session: Not on file   ? Stress: Not on file   Relationships   ? Social connections:     Talks on phone: Not on file     Gets together: Not on file     Attends Zoroastrian service: Not on file     Active member of club or organization: Not on file     Attends meetings of clubs or organizations: Not on file     Relationship  status: Not on file   ? Intimate partner violence:     Fear of current or ex partner: Not on file     Emotionally abused: Not on file     Physically abused: Not on file     Forced sexual activity: Not on file   Other Topics Concern   ? Not on file   Social History Narrative   ? Not on file     Relevant history was reviewed with the patient today, unless noted in HPI, nothing is pertinent for this visit.  [facility]     Patient Active Problem List    Diagnosis Date Noted   ? Adhesive capsulitis of right shoulder 02/25/2019   ? Biceps tendinitis of right upper extremity 02/25/2019   ? Right sided temporal headache 12/04/2017   ? Chronic Post-traumatic Stress Disorder      Overview Note:     Created by Conversion       ? Major Depression, Single Episode      Overview Note:     Created by Conversion         Past Surgical History:   Procedure Laterality Date   ? BARTHOLIN GLAND CYST EXCISION  08/27/2009   ? PARTIAL HYSTERECTOMY  04/2010       RESULTS/CONSULTS (Lab/Rad)     No results found for this or any previous visit (from the past 168 hour(s)).  Ct Abdomen Pelvis Without Oral Without Iv Contrast    Result Date: 4/11/2019  EXAM: CT ABDOMEN PELVIS WO ORAL WO IV CONTRAST LOCATION: [facility] DATE/TIME: 4/11/2019 10:33 AM INDICATION: Kidney stone right flank pain COMPARISON: None. TECHNIQUE: Helical thin-section CT scan of the abdomen and pelvis was performed without oral or IV contrast. Multiplanar reformats were obtained. Dose reduction techniques were used. CONTRAST: None. FINDINGS: LUNG BASES: Negative. ABDOMEN: Normal kidneys, no stones or hydronephrosis. Liver, gallbladder, pancreas and spleen are negative. PELVIS: There are 3 tiny 1 mm punctate calcifications along right side of pelvis near the expected position of the UVJ, all of these are likely phleboliths though can't completely exclude a nonobstructing distal ureteral stone, ureter is nondilated and not discretely visualized. No bladder stones. Bowel  unremarkable with appendix appearing normal. No adnexal lesions or free fluid. MUSCULOSKELETAL: Negative.     CONCLUSION: 1.  No etiology for symptoms evident. No definite urinary tract stones or hydronephrosis. No inflammatory changes of bowel. 2.  There are 3 tiny punctate presumed phleboliths near right UVJ as described.    MEDICATIONS     Current Outpatient Medications on File Prior to Visit   Medication Sig Dispense Refill   ? acetaminophen (TYLENOL EXTRA STRENGTH) 500 MG tablet Take 1-2 tablets by mouth every 4-6 hours as needed for pain.  Do not take more than 8 tablets in one day.     ? FLUoxetine (PROZAC) 20 MG capsule Take 1 capsule (20 mg total) by mouth daily. 30 capsule 2   ? ibuprofen (ADVIL,MOTRIN) 200 MG tablet Take 200 mg by mouth as needed for pain.     ? multivitamin therapeutic (THERAGRAN) tablet Take 1 tablet by mouth daily.     ? omega-3/dha/epa/fish oil (FISH OIL-OMEGA-3 FATTY ACIDS) 300-1,000 mg capsule Take 2 g by mouth daily.     ? oxyCODONE-acetaminophen (PERCOCET) 5-325 mg per tablet Take 1 tablet by mouth every 4 (four) hours as needed for pain. 13 tablet 0     No current facility-administered medications on file prior to visit.        HEALTH MAINTENANCE / SCREENING   PHQ-2 Total Score: 0 (2/18/2019  3:00 PM)  , PHQ-9 Total Score: 4 (2/18/2019  3:00 PM)  ,PILAR 7 Total Score: 2 (2/18/2019  3:00 PM)    Immunization History   Administered Date(s) Administered   ? Hep A, Adult IM (19yr & older) 03/29/2010   ? Hep A, historic 03/29/2010   ? Influenza, seasonal,quad inj 6-35 mos 09/29/2011   ? Influenza,seasonal, Inj IIV3 09/29/2011   ? Tdap 03/29/2010     Health Maintenance   Topic   ? INFLUENZA VACCINE RULE BASED (1)   ? DEPRESSION FOLLOW UP    ? TDAP ADULT ONE TIME DOSE    ? ADVANCE DIRECTIVES DISCUSSED WITH PATIENT    ? MAMMOGRAM    ? COLONOSCOPY    ? TD 18+ HE    ? PAP SMEAR        Chris Edwards MD  Family Medicine, [facility]     This note was dictated using a  voice recognition software.  Any grammatical or context distortion are unintentional and inherent to the software.

## 2021-06-28 NOTE — PROGRESS NOTES
"Progress Notes by Chris Edwards MD at 10/10/2019 10:40 AM     Author: Chris Edwards MD Service: -- Author Type: Physician    Filed: 10/10/2019 11:57 AM Encounter Date: 10/10/2019 Status: Signed    : Chris Edwards MD (Physician)       OFFICE VISIT - FAMILY MEDICINE     ASSESSMENT AND PLAN     1. Adhesive capsulitis of right shoulder     2. Neck pain  cyclobenzaprine (FLEXERIL) 5 MG tablet   Right frozen shoulder, continue with physical therapy, symptomatic care, FMLA form was completed.  Neck pain with muscle spasm, symptomatic care discussed, Flexeril as needed at night as needed, possible side effect discussed.  FMLA form was completed.    CHIEF COMPLAINT   Shoulder Pain (Rt.- went to specialist did cortisone injection and some PT, still having pain in shoulder. Works on computer for employment, is aggravating shoulder pain. Gets flare ups of pain, arm gets hard, difficult to sleep at night-has sharp shooting pain.) and [employer] Paperwork (for Rt. Shoulder)    HPI   Deandra Colón is a 54 y.o. female.  Updated [facility] - Mammogram Ordered, 08/01/17 - Needs Aspirin/Lipid    Right frozen shoulder with tendinitis, limited range of motion, has had 3 steroid injection, with no significant improvement, has been seen by orthopedist in the recent past, currently doing physical therapy once a week, taking ibuprofen and Tylenol as needed.  She does have a FMLA form to be completed.  Also complains of some mild intermittent right neck muscle spasm, not significantly improved with rest.  No numbness tingling to her right upper extremity.      Review of Systems As per HPI, otherwise negative.    OBJECTIVE   BP (!) 89/60 (Patient Site: Left Arm, Patient Position: Sitting, Cuff Size: Adult Regular)   Pulse 67   Ht 5' 0.75\" (1.543 m)   Wt 138 lb 12 oz (62.9 kg)   SpO2 100%   BMI 26.43 kg/m    Physical Exam   Constitutional: She is oriented to person, place, and time. She appears " well-developed and well-nourished.   HENT:   Head: Normocephalic and atraumatic.   Neck: Normal range of motion. Neck supple.   Cardiovascular: Normal rate, regular rhythm, normal heart sounds and intact distal pulses. Exam reveals no gallop and no friction rub.   No murmur heard.  Pulmonary/Chest: Effort normal and breath sounds normal. No respiratory distress. She has no wheezes. She has no rales. Musculoskeletal:         General: No tenderness or edema.        Arms:      Neurological: She is alert and oriented to person, place, and time. Coordination normal.   Psychiatric: She has a normal mood and affect. Judgment and thought content normal.       PFSH     Family History   Problem Relation Age of Onset   ? Vision loss Mother    ? Glaucoma Mother    ? Kidney disease Father    ? Diabetes Father      Social History     Socioeconomic History   ? Marital status: Single     Spouse name: Not on file   ? Number of children: Not on file   ? Years of education: Not on file   ? Highest education level: Not on file   Occupational History   ? Not on file   Social Needs   ? Financial resource strain: Not on file   ? Food insecurity:     Worry: Not on file     Inability: Not on file   ? Transportation needs:     Medical: Not on file     Non-medical: Not on file   Tobacco Use   ? Smoking status: Never Smoker   ? Smokeless tobacco: Never Used   Substance and Sexual Activity   ? Alcohol use: Not on file   ? Drug use: Not on file   ? Sexual activity: Not on file   Lifestyle   ? Physical activity:     Days per week: Not on file     Minutes per session: Not on file   ? Stress: Not on file   Relationships   ? Social connections:     Talks on phone: Not on file     Gets together: Not on file     Attends [employer]an service: Not on file     Active member of club or organization: Not on file     Attends meetings of clubs or organizations: Not on file     Relationship status: Not on file   ? Intimate partner violence:     Fear of current  or ex partner: Not on file     Emotionally abused: Not on file     Physically abused: Not on file     Forced sexual activity: Not on file   Other Topics Concern   ? Not on file   Social History Narrative   ? Not on file     Relevant history was reviewed with the patient today, unless noted in HPI, nothing is pertinent for this visit.  [facility]     Patient Active Problem List    Diagnosis Date Noted   ? Adhesive capsulitis of right shoulder 02/25/2019   ? Biceps tendinitis of right upper extremity 02/25/2019   ? Right sided temporal headache 12/04/2017   ? Chronic Post-traumatic Stress Disorder      Overview Note:     Created by Conversion       ? Major Depression, Single Episode      Overview Note:     Created by Conversion         Past Surgical History:   Procedure Laterality Date   ? BARTHOLIN GLAND CYST EXCISION  08/27/2009   ? PARTIAL HYSTERECTOMY  04/2010       RESULTS/CONSULTS (Lab/Rad)   No results found for this or any previous visit (from the past 168 hour(s)).  No results found.  MEDICATIONS     Current Outpatient Medications on File Prior to Visit   Medication Sig Dispense Refill   ? acetaminophen (TYLENOL EXTRA STRENGTH) 500 MG tablet Take 1-2 tablets by mouth every 4-6 hours as needed for pain.  Do not take more than 8 tablets in one day.     ? ibuprofen (ADVIL,MOTRIN) 600 MG tablet Take 1 tablet (600 mg total) by mouth every 6 (six) hours as needed for pain. 60 tablet 1   ? Lactobacillus rhamnosus GG (CULTURELLE) 15 billion cell CpSP Take 1 capsule by mouth 2 (two) times a day with meals.     ? multivitamin therapeutic (THERAGRAN) tablet Take 1 tablet by mouth daily.     ? omega-3/dha/epa/fish oil (FISH OIL-OMEGA-3 FATTY ACIDS) 300-1,000 mg capsule Take 2 g by mouth daily.     ? [DISCONTINUED] FLUoxetine (PROZAC) 20 MG capsule TAKE 1 CAPSULE (20 MG TOTAL) BY MOUTH DAILY. 90 capsule 3   ? [DISCONTINUED] oxyCODONE-acetaminophen (PERCOCET) 5-325 mg per tablet Take 1 tablet by mouth every 4 (four) hours as  needed for pain. 13 tablet 0     No current facility-administered medications on file prior to visit.        HEALTH MAINTENANCE / SCREENING   PHQ-2 Total Score: 0 (2/18/2019  3:00 PM)  , PHQ-9 Total Score: 4 (2/18/2019  3:00 PM)  ,PILAR 7 Total Score: 2 (2/18/2019  3:00 PM)    Immunization History   Administered Date(s) Administered   ? Hep A, Adult IM (19yr & older) 03/29/2010   ? Hep A, historic 03/29/2010   ? Influenza, seasonal,quad inj 6-35 mos 09/29/2011   ? Influenza,seasonal, Inj IIV3 09/29/2011   ? Tdap 03/29/2010     Health Maintenance   Topic   ? HEPATITIS C SCREENING    ? PREVENTIVE CARE VISIT    ? HIV SCREENING    ? ZOSTER VACCINES (1 of 2)   ? INFLUENZA VACCINE RULE BASED (1)   ? TDAP ADULT ONE TIME DOSE    ? DEPRESSION FOLLOW UP    ? ADVANCE CARE PLANNING    ? MAMMOGRAM    ? COLONOSCOPY    ? TD 18+ HE    ? PAP SMEAR    ? HPV TEST        Chris Edwards MD  Family Medicine, [facility]     This note was dictated using a voice recognition software.  Any grammatical or context distortion are unintentional and inherent to the software.

## 2021-08-13 ENCOUNTER — OFFICE VISIT (OUTPATIENT)
Dept: FAMILY MEDICINE | Facility: CLINIC | Age: 56
End: 2021-08-13
Payer: COMMERCIAL

## 2021-08-13 VITALS
SYSTOLIC BLOOD PRESSURE: 100 MMHG | DIASTOLIC BLOOD PRESSURE: 73 MMHG | TEMPERATURE: 97.5 F | HEART RATE: 66 BPM | OXYGEN SATURATION: 100 % | BODY MASS INDEX: 23.81 KG/M2 | WEIGHT: 125 LBS

## 2021-08-13 DIAGNOSIS — M72.2 PLANTAR FASCIITIS, LEFT: ICD-10-CM

## 2021-08-13 DIAGNOSIS — M25.511 ACUTE PAIN OF RIGHT SHOULDER: ICD-10-CM

## 2021-08-13 DIAGNOSIS — F33.0 MILD EPISODE OF RECURRENT MAJOR DEPRESSIVE DISORDER (H): ICD-10-CM

## 2021-08-13 PROCEDURE — 99214 OFFICE O/P EST MOD 30 MIN: CPT | Performed by: FAMILY MEDICINE

## 2021-08-13 ASSESSMENT — ANXIETY QUESTIONNAIRES
6. BECOMING EASILY ANNOYED OR IRRITABLE: SEVERAL DAYS
2. NOT BEING ABLE TO STOP OR CONTROL WORRYING: SEVERAL DAYS
7. FEELING AFRAID AS IF SOMETHING AWFUL MIGHT HAPPEN: SEVERAL DAYS
3. WORRYING TOO MUCH ABOUT DIFFERENT THINGS: SEVERAL DAYS
5. BEING SO RESTLESS THAT IT IS HARD TO SIT STILL: NOT AT ALL
GAD7 TOTAL SCORE: 4
IF YOU CHECKED OFF ANY PROBLEMS ON THIS QUESTIONNAIRE, HOW DIFFICULT HAVE THESE PROBLEMS MADE IT FOR YOU TO DO YOUR WORK, TAKE CARE OF THINGS AT HOME, OR GET ALONG WITH OTHER PEOPLE: NOT DIFFICULT AT ALL
1. FEELING NERVOUS, ANXIOUS, OR ON EDGE: NOT AT ALL

## 2021-08-13 ASSESSMENT — PATIENT HEALTH QUESTIONNAIRE - PHQ9
5. POOR APPETITE OR OVEREATING: NOT AT ALL
SUM OF ALL RESPONSES TO PHQ QUESTIONS 1-9: 3

## 2021-08-13 NOTE — PROGRESS NOTES
Assessment & Plan     1. Mild episode of recurrent major depressive disorder (H)  - Declined starting antidepressant due to previous side effects with Prozac. She is interested in seeing therapist, referral placed.   - MENTAL HEALTH REFERRAL  - Adult; Outpatient Treatment; Individual/Couples/Family/Group Therapy/Health Psychology; Other: [facility] [phone]; We will contact you to schedule the appointment or please call with any questions; Future    2. Acute pain of right shoulder  - H/o adhesive capsulitis and previously had cortisone injection and completed PT. Symptoms not c/w adhesive capsulitis, pt requested cortisone injection. Referral made for further evaluation.   - Orthopedic  Referral; Future  - Orthopedic  Referral; Future    3. Plantar fasciitis, left  - Symptoms not improving after 2 years, she does supportive care at home. Offered boot, pt declined. Referred to podiatry for further management.   - Orthopedic  Referral; Future  - Orthopedic  Referral; Future          Dominic Hickman MD  [facility]    Sandra Liriano is a 56 year old who presents for the following health issues     HPI     MDD - She notes that mood is up and down but overall ok. Few years ago she took Prozac which helped with mood but couldn't sleep. She hasn't restarted prozac due to side effects. She was previously given trazodone to help with sleep which didn't help. She hasn't tried any other anti-depressants.     H/o right shoulder frozen shoulder - received steroid injection 2019. Pain improved after injection and now starting to have pain. No recent trauma. Pain is present with overuse.       Social History     Tobacco Use     Smoking status: Never Smoker     Smokeless tobacco: Never Used   Substance Use Topics     Alcohol use: None     Drug use: None     PHQ 10/10/2019   PHQ-9 Total Score 4   Q9: Thoughts of better off dead/self-harm past 2  weeks Not at all     PILAR-7 SCORE 10/10/2019   Total Score 4         She as left hell pain for two years. Pain is constant and feels swollen. Area is sensitive. No recent trauma. She walks a lot. Her current sandals are her more supportive shoes.         Review of Systems         Objective    There were no vitals taken for this visit.  There is no height or weight on file to calculate BMI.  Physical Exam   MSK:  Left foot tenderness along the plantar fascia  Right shoulder: normal ROM, negative neer's and hawkin's   PSYCH: mood depressed

## 2021-08-14 ASSESSMENT — ANXIETY QUESTIONNAIRES: GAD7 TOTAL SCORE: 4

## 2021-08-26 ENCOUNTER — VIRTUAL VISIT (OUTPATIENT)
Dept: PSYCHOLOGY | Facility: CLINIC | Age: 56
End: 2021-08-26
Attending: FAMILY MEDICINE
Payer: COMMERCIAL

## 2021-08-26 DIAGNOSIS — F33.0 MILD EPISODE OF RECURRENT MAJOR DEPRESSIVE DISORDER (H): ICD-10-CM

## 2021-08-26 PROCEDURE — 90791 PSYCH DIAGNOSTIC EVALUATION: CPT | Mod: GT | Performed by: MARRIAGE & FAMILY THERAPIST

## 2021-08-26 ASSESSMENT — COLUMBIA-SUICIDE SEVERITY RATING SCALE - C-SSRS
5. HAVE YOU STARTED TO WORK OUT OR WORKED OUT THE DETAILS OF HOW TO KILL YOURSELF? DO YOU INTEND TO CARRY OUT THIS PLAN?: NO
3. HAVE YOU BEEN THINKING ABOUT HOW YOU MIGHT KILL YOURSELF?: NO
ATTEMPT LIFETIME: NO
4. HAVE YOU HAD THESE THOUGHTS AND HAD SOME INTENTION OF ACTING ON THEM?: NO
4. HAVE YOU HAD THESE THOUGHTS AND HAD SOME INTENTION OF ACTING ON THEM?: NO
1. IN THE PAST MONTH, HAVE YOU WISHED YOU WERE DEAD OR WISHED YOU COULD GO TO SLEEP AND NOT WAKE UP?: NO
2. HAVE YOU ACTUALLY HAD ANY THOUGHTS OF KILLING YOURSELF?: NO
ATTEMPT PAST THREE MONTHS: NO
5. HAVE YOU STARTED TO WORK OUT OR WORKED OUT THE DETAILS OF HOW TO KILL YOURSELF? DO YOU INTEND TO CARRY OUT THIS PLAN?: NO
1. IN THE PAST MONTH, HAVE YOU WISHED YOU WERE DEAD OR WISHED YOU COULD GO TO SLEEP AND NOT WAKE UP?: NO
TOTAL  NUMBER OF ABORTED OR SELF INTERRUPTED ATTEMPTS PAST LIFETIME: NO
TOTAL  NUMBER OF ABORTED OR SELF INTERRUPTED ATTEMPTS PAST 3 MONTHS: NO
6. HAVE YOU EVER DONE ANYTHING, STARTED TO DO ANYTHING, OR PREPARED TO DO ANYTHING TO END YOUR LIFE?: NO
TOTAL  NUMBER OF INTERRUPTED ATTEMPTS LIFETIME: NO
6. HAVE YOU EVER DONE ANYTHING, STARTED TO DO ANYTHING, OR PREPARED TO DO ANYTHING TO END YOUR LIFE?: NO
2. HAVE YOU ACTUALLY HAD ANY THOUGHTS OF KILLING YOURSELF LIFETIME?: NO
TOTAL  NUMBER OF INTERRUPTED ATTEMPTS PAST 3 MONTHS: NO

## 2021-08-26 NOTE — Clinical Note
Cole Hickman,  I met with Deandra for a counseling session today. She is grieving and dealing with depression. We talked about her experience with prozac that apparently gave her insomnia. I've suggested she talk to you about options such as lexapro that many of my patients tolerate well. She was open to trying another antidepressant.    Thank you for collaborating!    Elizabeth Ivy, [facility]

## 2021-08-26 NOTE — PROGRESS NOTES
"[facility]  Provider Name:  Elizabeth Ivy     Credentials:  LMFT    PATIENT'S NAME: Deandra Colón  PREFERRED NAME: Deandra  PRONOUNS:       MRN: [id number]  : 1965  ADDRESS: [address] [place] [address]  ACCT. NUMBER:  [id number]  DATE OF SERVICE: 21  START TIME: 10:00AM  END TIME: 10:45AM  PREFERRED PHONE: [phone]  May we leave a program related message: Yes  SERVICE MODALITY:  Video Visit:      Provider verified identity through the following two step process.  Patient provided:  Patient photo    Telemedicine Visit: The patient's condition can be safely assessed and treated via synchronous audio and visual telemedicine encounter.      Reason for Telemedicine Visit: Patient has requested telehealth visit    Originating Site (Patient Location): Patient's home    Distant Site (Provider Location): Provider Remote Setting- Home Office    Consent:  The patient/guardian has verbally consented to: the potential risks and benefits of telemedicine (video visit) versus in person care; bill my insurance or make self-payment for services provided; and responsibility for payment of non-covered services.     Patient would like the video invitation sent by:  Send to e-mail at: [email or web address]    Mode of Communication:  Video Conference via Amwell    As the provider I attest to compliance with applicable laws and regulations related to telemedicine.    UNIVERSAL ADULT Mental Health DIAGNOSTIC ASSESSMENT    Identifying Information:  Patient is a 56 year old, [facility] .  The pronoun use throughout this assessment reflects the patient's chosen pronoun.  Patient was referred for an assessment by self .  Patient attended the session alone.     Chief Complaint:   The reason for seeking services at this time is: \" needing help with grief and loss.  The problem(s) began  when her best friend  in 2018.    Patient has attempted to resolve these concerns in the past through " medication management.    Social/Family History:  Patient reported they grew up in [place].  They were raised by biological mother.  Parents unsure if they were together.   Patient reported that their childhood was unsure- did not address this at this appointment.  Patient described their current relationships with family of origin as distant as they mostly live still in [place] and she has little contact with them.      The patient describes their cultural background as [facility].  Cultural influences and impact on patient's life structure, values, norms, and healthcare: Social Orientation: client struggling with single lifestyle.  Contextual influences on patient's health include: Individual Factors grief and loss, dealing with loneliness.    These factors will be addressed in the Preliminary Treatment plan.  Patient identified their preferred language to be English. Patient reported they does not need the assistance of an  or other support involved in therapy.     Patient reported had no significant delays in developmental tasks.   Patient's highest education level was college graduate. Patient identified the following learning problems: none reported.  Modifications will not be used to assist communication in therapy.   Patient reports they are  able to understand written materials.    Patient reported the following relationship history some dating but no marriage.  Patient's current relationship status is single for most of her life.   Patient identified their sexual orientation as heterosexual.  Patient reported having zero child(jeromy). Patient identified friends as part of their support system.  Patient identified the quality of these relationships as good. Client would like to have closer friends and is grieving the loss of her best friend.    Patient's current living/housing situation involves staying in own home/apartment.  They live with themselves and they report that housing is  stable.     Patient is currently employed full time and reports they are able to function appropriately at work..  Patient reports their finances are obtained through employment.  Patient does not identify finances as a current stressor.      Patient reported that they have not been involved with the legal system.   Patient denies being on probation / parole / under the jurisdiction of the court.    Patient's Strengths and Limitations:  Patient identified the following strengths or resources that will help them succeed in treatment: commitment to health and well being, insight, intelligence and work ethic. Things that may interfere with the patient's success in treatment include: none identified.     Personal and Family Medical History:   Patient does report a family history of mental health concerns.  Patient reports family history includes Diabetes in her father; Glaucoma in her mother; Kidney Disease in her father; Vision Loss in her mother..     Patient does report Mental Health Diagnosis and/or Treatment.  Patient Patient reported the following previous diagnoses which include(s): Depression.  Patient reported symptoms began when her friend  in 2018.   Patient has received mental health services in the past: medication from Primary.  Psychiatric Hospitalizations: None.  Patient denies a history of civil commitment.  Patient is not receiving other mental health services.  These include n/a.         Patient has had a physical exam to rule out medical causes for current symptoms.  Date of last physical exam was within the past year. Client was encouraged to follow up with PCP if symptoms were to develop. The patient has a [facility] Provider, who is named Dr. Hickman.  Patient reports no current medical concerns.  Patient denies any issues with pain..   There are not significant appetite / nutritional concerns / weight changes.   Patient does not report a history of head injury / trauma / cognitive  impairment.      Patient reports not taking any current medications    Medication Adherence:  Patient reports having diffiulty with insomnia taking prozac. May be willing to take a different SSRI.    Patient Allergies:  No Known Allergies    Medical History:    Past Medical History:   Diagnosis Date     Hair loss      Uterine fibroid          Current Mental Status Exam:   Appearance:  Appropriate    Eye Contact:  Good   Psychomotor:  Normal       Gait / station:  no problem  Attitude / Demeanor: Cooperative   Speech      Rate / Production: Normal/ Responsive      Volume:  Normal  volume      Language:  intact  Mood:   Sad   Affect:   Tearful   Thought Content: Clear   Thought Process: Goal Directed       Associations: No loosening of associations  Insight:   Good   Judgment:  Intact   Orientation:  All  Attention/concentration: Good    Rating Scales:    PHQ9:    PHQ-9 SCORE 10/10/2019 8/13/2021   PHQ-9 Total Score 4 3   ;    GAD7:    PILAR-7 SCORE 10/10/2019 8/13/2021   Total Score 4 4     CGI:     First:Considering your total clinical experience with this particular patient population, how severe are the patient's symptoms at this time?: 3 (8/26/2021 11:44 AM)  ;    Most recentNo data recorded    Substance Use:  Patient did not report a family history of substance use concerns; see medical history section for details.  Patient has not received chemical dependency treatment in the past.  Patient has not ever been to detox.      Patient is not currently receiving any chemical dependency treatment. Patient reported the following problems as a result of their substance use:  n/a.    Patient denies using alcohol.  Patient denies using tobacco.  Patient denies using cannabis.  Patient reports using caffeine 1 times per day and drinks 1 at a time. Patient started using caffeine at age ?.  Patient reports using/abusing the following substance(s). Patient reported no other substance use.     CAGE- AID:  No flowsheet data  found.    Substance Use: No symptoms    Based on the negative CAGE score and clinical interview there  are not indications of drug or alcohol abuse.      Significant Losses / Trauma / Abuse / Neglect Issues:   Patient   did not serve in the .  There are indications or report of significant loss, trauma, abuse or neglect issues related to: death of her best friend.  Concerns for possible neglect are not present.     Safety Assessment:   Current Safety Concerns:  Sanders Suicide Severity Rating Scale (Lifetime/Recent)  Sanders Suicide Severity Rating (Lifetime/Recent) 8/26/2021   1. Wish to be Dead (Lifetime) No   1. Wish to be Dead (Recent) No   2. Non-Specific Active Suicidal Thoughts (Lifetime) No   2. Non-Specific Active Suicidal Thoughts (Recent) No   3. Active Suicidal Ideation with any Methods (Not Plan) Without Intent to Act (Lifetime) No   3. Active Suicidal Ideation with any Methods (Not Plan) Without Intent to Act (Recent) No   4. Active Suicidal Ideation with Some Intent to Act, Without Specific Plan (Lifetime) No   4. Active Suicidal Ideation with Some Intent to Act, Without Specific Plan (Recent) No   5. Active Suicidal Ideation with Specific Plan and Intent (Lifetime) No   5. Active Suicidal Ideation with Specific Plan and Intent (Recent) No   Most Severe Ideation Rating (Lifetime) NA   Frequency (Lifetime) NA   Duration (Lifetime) NA   Controllability (Lifetime) NA   Protective Factors  (Lifetime) NA   Reasons for Ideation (Lifetime) NA   Most Severe Ideation Rating (Past Month) NA   Frequency (Past Month) NA   Duration (Past Month) NA   Controllability (Past Month) NA   Protective Factors (Past Month) NA   Reasons for Ideation (Past Month) NA   Actual Attempt (Lifetime) No   Actual Attempt (Past 3 Months) No   Has subject engaged in non-suicidal self-injurious behavior? (Lifetime) No   Has subject engaged in non-suicidal self-injurious behavior? (Past 3 Months) No   Interrupted Attempts  (Lifetime) No   Interrupted Attempts (Past 3 Months) No   Aborted or Self-Interrupted Attempt (Lifetime) No   Aborted or Self-Interrupted Attempt (Past 3 Months) No   Preparatory Acts or Behavior (Lifetime) No   Preparatory Acts or Behavior (Past 3 Months) No   Most Recent Attempt Actual Lethality Code NA   Most Lethal Attempt Actual Lethality Code NA   Initial/First Attempt Actual Lethality Code NA     Patient denies current homicidal ideation and behaviors.  Patient denies current self-injurious ideation and behaviors.    Patient denied risk behaviors associated with substance use.  Patient denies any high risk behaviors associated with mental health symptoms.  Patient reports the following current concerns for their personal safety: None.  Patient reports there   firearms in the house.       No firearms.    History of Safety Concerns:  Patient denied a history of homicidal ideation.     Patient denied a history of personal safety concerns.    Patient denied a history of assaultive behaviors.    Patient denied a history of sexual assault behaviors.     Patient denied a history of risk behaviors associated with substance use.  Patient denies any history of high risk behaviors associated with mental health symptoms.  Patient reports the following protective factors:      Risk Plan:  See Recommendations for Safety and Risk Management Plan    Review of Symptoms per patient report:  Depression: Change in sleep, Lack of interest, Feeling sad, down, or depressed and Frequent crying  Enedina:  No Symptoms  Psychosis: No Symptoms  Anxiety: Excessive worry, Nervousness, Sleep disturbance and Ruminations  Panic:  No symptoms  Post Traumatic Stress Disorder:  No Symptoms   Eating Disorder: No Symptoms  ADD / ADHD:  No symptoms  Conduct Disorder: No symptoms  Autism Spectrum Disorder: No symptoms  Obsessive Compulsive Disorder: No Symptoms    Patient reports the following compulsive behaviors and treatment history: n/a.       Diagnostic Criteria:   A) Recurrent episode(s) - symptoms have been present during the same 2-week period and represent a change from previous functioning 5 or more symptoms (required for diagnosis)   - Depressed mood. Note: In children and adolescents, can be irritable mood.     - Diminished interest or pleasure in all, or almost all, activities.    - Decreased sleep.    - Fatigue or loss of energy.    - Diminished ability to think or concentrate, or indecisiveness.   B) The symptoms cause clinically significant distress or impairment in social, occupational, or other important areas of functioning  D) The occurence of major depressive episode is not better explained by other thought / psychotic disorders  E) There has never been a manic episode or hypomanic episode    Functional Status:  Patient reports the following functional impairments: relationship(s) and social interactions.     WHODAS:   WHODAS 2.0 Total Score 8/26/2021   Total Score 12     Nonprogrammatic care:  Patient is requesting basic services to address current mental health concerns.    Clinical Summary:  1. Reason for assessment: grief and loss  .  2. Psychosocial, Cultural and Contextual Factors: single, few friends, family out of state and country, grew up outside [facility]  .  3. Principal DSM5 Diagnoses  (Sustained by DSM5 Criteria Listed Above):   296.32 (F33.1) Major Depressive Disorder, Recurrent Episode, Moderate _ and With anxious distress.  4. Other Diagnoses that is relevant to services:   300.02 (F41.1) Generalized Anxiety Disorder.  5. Provisional Diagnosis:  296.32 (F33.1) Major Depressive Disorder, Recurrent Episode, Moderate _ and With anxious distress as evidenced by grief and loss .  6. Prognosis: Return to Normal Functioning.  7. Likely consequences of symptoms if not treated: worsening.  8. Client strengths include:  caring, creative, educated, empathetic, employed, goal-focused, good listener, has a previous history of  therapy, insightful, intelligent, motivated, open to learning, open to suggestions / feedback, wants to learn, willing to ask questions, willing to relate to others and work history .     Recommendations:     1. Plan for Safety and Risk Management:   Recommended that patient call 911 or go to the local ED should there be a change in any of these risk factors..          Report to child / adult protection services was NA.     2. Patient's identified cultural concerns will be addressed by therapist asking questions/clarifying client values.     3. Initial Treatment will focus on:    Depressed Mood - improve coping, grief and loss work.     4. Resources/Service Plan:    services are not indicated.   Modifications to assist communication are not indicated.   Additional disability accommodations are not indicated.      5. Collaboration:   Collaboration / coordination of treatment will be initiated with the following  support professionals: primary care physician.      6.  Referrals:   The following referral(s) will be initiated: grief support group. Next Scheduled Appointment: 9/15 at 5PM.     A Release of Information has been obtained for the following: n/a.    7. HI:    HI:  Discussed the general effects of drugs and alcohol on health and well-being. Provider gave patient printed information about the effects of chemical use on their health and well being. Recommendations:  n/a .     8. Records:   These were reviewed at time of assessment.   Information in this assessment was obtained from the medical record and  provided by patient who is a good historian.    Patient will have open access to their mental health medical record.      Provider Name/ Credentials:  INOCENCIA Hunter  August 26, 2021

## 2021-09-02 ENCOUNTER — MYC MEDICAL ADVICE (OUTPATIENT)
Dept: FAMILY MEDICINE | Facility: CLINIC | Age: 56
End: 2021-09-02

## 2021-09-07 ENCOUNTER — NURSE TRIAGE (OUTPATIENT)
Dept: NURSING | Facility: CLINIC | Age: 56
End: 2021-09-07

## 2021-09-07 DIAGNOSIS — F33.9 EPISODE OF RECURRENT MAJOR DEPRESSIVE DISORDER, UNSPECIFIED DEPRESSION EPISODE SEVERITY (H): Primary | ICD-10-CM

## 2021-09-07 NOTE — TELEPHONE ENCOUNTER
"    Reason for Disposition    [1] Caller has NON-URGENT medication question about med that PCP prescribed AND [2] triager unable to answer question    Protocols used: MEDICATION QUESTION CALL-A-    Clinic Action Needed:YES please call pt in am 9/8 at [phone]  Reason for Call:\"I saw the doctor on 8/13, she referred me to a therapist, I went there,they are advising I go on an antidepressant . I sent the doctor a message and I have not heard back . Can you send her another message?\"    Patient Recommendations/Teaching: Message routed PCP  Routed to:PCP  Any Cali RN [facility]        "

## 2021-09-08 RX ORDER — SERTRALINE HYDROCHLORIDE 25 MG/1
25 TABLET, FILM COATED ORAL DAILY
Qty: 30 TABLET | Refills: 0 | Status: SHIPPED | OUTPATIENT
Start: 2021-09-08 | End: 2021-10-08

## 2021-09-08 NOTE — TELEPHONE ENCOUNTER
Reviewed psychologist note - no recommendations on antidepressants. During our visit pt declined restarting prozac. Is she currently interested in starting antidepressants? Most antidepressants have sleep disturbances, nausea/vomiting, stomach upset for the first 2 weeks and then symptoms improve. They do have small amount of weight gain along with sexual side effects. Due to side effect with Prozac, we can try another selective serotonin reuptake inhibitor if pt is interested at a low dose and have her follow up with virtual visit in 4 weeks.  KARAN

## 2021-09-08 NOTE — TELEPHONE ENCOUNTER
Left message on answering machine for patient to call clinic triage.  We want to get you this provider response.  KATRINA Mccallum

## 2021-09-08 NOTE — TELEPHONE ENCOUNTER
See 9/7/21 nurse triage encounter.    UNA JovelN, RN  [facility]

## 2021-09-08 NOTE — TELEPHONE ENCOUNTER
Dinora, I am sorry that you had a bad encounter with pt. I have sent in Zoloft 25 mg daily for one month. Pt can follow up with PCP in one month.  Thanks!  DM

## 2021-09-08 NOTE — TELEPHONE ENCOUNTER
Dr. Hickman-  1. Patient agreeable to starting an alternative to Prozac   A. Pharmacy pended  2. Patient expressed frustration stating she feels ignored Dr. Hickman did not respond to her message from a week and a half ago (writer unable to find documentation from 1.5 weeks ago from patient)    Writer called patient and attempted to review message per Dr. Hickman.    Patient screamed at writer during the phone conversation and expressed dissatisfaction of the care she received.    After multiple attempts writer was able to review communication from Dr. Hickman with patient.    Thank you!  UNA JovelN, RN  [facility]

## 2021-09-09 NOTE — TELEPHONE ENCOUNTER
Call received from patient:  1. Message per Dr. Hickman regarding medication prescribed and follow up recommendations    Patient verbalized understanding and in agreement with plan.    Patient requested in-person follow up visit with Dr. Hickman.    Appt scheduled on 10/8/21.  Appt date, time and location confirmed with patient.    JOSE ARMANDO Jovel, RN  [facility]

## 2021-09-15 ENCOUNTER — VIRTUAL VISIT (OUTPATIENT)
Dept: PSYCHOLOGY | Facility: CLINIC | Age: 56
End: 2021-09-15
Payer: COMMERCIAL

## 2021-09-15 DIAGNOSIS — F32.A MILD EPISODE OF DEPRESSION: Primary | ICD-10-CM

## 2021-09-15 PROCEDURE — 90834 PSYTX W PT 45 MINUTES: CPT | Mod: GT | Performed by: MARRIAGE & FAMILY THERAPIST

## 2021-09-16 NOTE — PROGRESS NOTES
Progress Note    Patient Name: Deandra Colón  Date: 9/15/2021         Service Type: Individual      Session Start Time: 5:02PM  Session End Time: 5:47PM     Session Length: 45    Session #: 2    Attendees: Client attended alone    Service Modality:  Video Visit:      Provider verified identity through the following two step process.  Patient provided:  Patient photo    Telemedicine Visit: The patient's condition can be safely assessed and treated via synchronous audio and visual telemedicine encounter.      Reason for Telemedicine Visit: Patient has requested telehealth visit    Originating Site (Patient Location): Patient's home    Distant Site (Provider Location): Provider Remote Setting- Home Office    Consent:  The patient/guardian has verbally consented to: the potential risks and benefits of telemedicine (video visit) versus in person care; bill my insurance or make self-payment for services provided; and responsibility for payment of non-covered services.     Patient would like the video invitation sent by:  My Chart    Mode of Communication:  Video Conference via Amwell    As the provider I attest to compliance with applicable laws and regulations related to telemedicine.     Treatment Plan Last Reviewed: Next session  PHQ-9 / PILAR-7 : Each session    DATA  Interactive Complexity: No  Crisis: No       Progress Since Last Session (Related to Symptoms / Goals / Homework):   Symptoms: No change ongoing stress    Homework: Partially completed      Episode of Care Goals: Satisfactory progress - ACTION (Actively working towards change); Intervened by reinforcing change plan / affirming steps taken     Current / Ongoing Stressors and Concerns:   Client was able to start medication and is on 25mg of zoloft. She will see her MD in 2 weeks to see about increasing it. She has been sad about her mother and her desire to see her.      Treatment Objective(s) Addressed in This  Session:   Increase interest, engagement, and pleasure in doing things     Intervention:   CBT: mindfulness practices        ASSESSMENT: Current Emotional / Mental Status (status of significant symptoms):   Risk status (Self / Other harm or suicidal ideation)   Patient denies current fears or concerns for personal safety.   Patient denies current or recent suicidal ideation or behaviors.   Patient denies current or recent homicidal ideation or behaviors.   Patient denies current or recent self injurious behavior or ideation.   Patient denies other safety concerns.   Patient reports there has been no change in risk factors since their last session.     Patient reports there has been no change in protective factors since their last session.     Recommended that patient call 911 or go to the local ED should there be a change in any of these risk factors.     Appearance:   Appropriate    Eye Contact:   Good    Psychomotor Behavior: Normal    Attitude:   Cooperative  Friendly Pleasant   Orientation:   All   Speech    Rate / Production: Normal     Volume:  Normal    Mood:    Anxious  Depressed  Sad    Affect:    Tearful Worrisome    Thought Content:  Clear    Thought Form:  Coherent  Logical    Insight:    Good      Medication Review:   No changes to current psychiatric medication(s)     Medication Compliance:   Yes     Changes in Health Issues:   None reported     Chemical Use Review:   Substance Use: Chemical use reviewed, no active concerns identified      Tobacco Use: No current tobacco use.      Diagnosis:  1. Mild episode of depression (H)        Collateral Reports Completed:   Not Applicable    PLAN: (Patient Tasks / Therapist Tasks / Other)  Homework: Client to meet with MD in 2 weeks.        Elizabeth Ivy                                                         ______________________________________________________________________

## 2021-10-08 ENCOUNTER — OFFICE VISIT (OUTPATIENT)
Dept: FAMILY MEDICINE | Facility: CLINIC | Age: 56
End: 2021-10-08
Payer: COMMERCIAL

## 2021-10-08 VITALS
HEART RATE: 78 BPM | OXYGEN SATURATION: 98 % | DIASTOLIC BLOOD PRESSURE: 63 MMHG | WEIGHT: 124 LBS | BODY MASS INDEX: 23.62 KG/M2 | SYSTOLIC BLOOD PRESSURE: 90 MMHG | TEMPERATURE: 98.3 F

## 2021-10-08 DIAGNOSIS — Z23 NEED FOR PROPHYLACTIC VACCINATION AND INOCULATION AGAINST INFLUENZA: ICD-10-CM

## 2021-10-08 DIAGNOSIS — M25.511 ACUTE PAIN OF RIGHT SHOULDER: ICD-10-CM

## 2021-10-08 DIAGNOSIS — F33.9 EPISODE OF RECURRENT MAJOR DEPRESSIVE DISORDER, UNSPECIFIED DEPRESSION EPISODE SEVERITY (H): ICD-10-CM

## 2021-10-08 PROBLEM — F32.9 MAJOR DEPRESSION, SINGLE EPISODE: Status: ACTIVE | Noted: 2019-12-10

## 2021-10-08 PROBLEM — M75.01 ADHESIVE CAPSULITIS OF RIGHT SHOULDER: Status: ACTIVE | Noted: 2019-02-25

## 2021-10-08 PROBLEM — M75.21 BICEPS TENDINITIS OF RIGHT UPPER EXTREMITY: Status: ACTIVE | Noted: 2019-02-25

## 2021-10-08 PROBLEM — F43.10 POSTTRAUMATIC STRESS DISORDER: Status: ACTIVE | Noted: 2019-12-10

## 2021-10-08 PROBLEM — R51.9 RIGHT SIDED TEMPORAL HEADACHE: Status: ACTIVE | Noted: 2017-12-04

## 2021-10-08 PROCEDURE — 90682 RIV4 VACC RECOMBINANT DNA IM: CPT | Performed by: FAMILY MEDICINE

## 2021-10-08 PROCEDURE — 90471 IMMUNIZATION ADMIN: CPT | Performed by: FAMILY MEDICINE

## 2021-10-08 PROCEDURE — 96127 BRIEF EMOTIONAL/BEHAV ASSMT: CPT | Mod: 59 | Performed by: FAMILY MEDICINE

## 2021-10-08 PROCEDURE — 99214 OFFICE O/P EST MOD 30 MIN: CPT | Mod: 25 | Performed by: FAMILY MEDICINE

## 2021-10-08 RX ORDER — SERTRALINE HYDROCHLORIDE 25 MG/1
25 TABLET, FILM COATED ORAL DAILY
Qty: 30 TABLET | Refills: 0 | Status: SHIPPED | OUTPATIENT
Start: 2021-10-08 | End: 2021-11-09

## 2021-10-08 ASSESSMENT — ANXIETY QUESTIONNAIRES
5. BEING SO RESTLESS THAT IT IS HARD TO SIT STILL: NOT AT ALL
4. TROUBLE RELAXING: NOT AT ALL
3. WORRYING TOO MUCH ABOUT DIFFERENT THINGS: NOT AT ALL
GAD7 TOTAL SCORE: 0
GAD7 TOTAL SCORE: 0
1. FEELING NERVOUS, ANXIOUS, OR ON EDGE: NOT AT ALL
7. FEELING AFRAID AS IF SOMETHING AWFUL MIGHT HAPPEN: NOT AT ALL
7. FEELING AFRAID AS IF SOMETHING AWFUL MIGHT HAPPEN: NOT AT ALL
6. BECOMING EASILY ANNOYED OR IRRITABLE: NOT AT ALL
8. IF YOU CHECKED OFF ANY PROBLEMS, HOW DIFFICULT HAVE THESE MADE IT FOR YOU TO DO YOUR WORK, TAKE CARE OF THINGS AT HOME, OR GET ALONG WITH OTHER PEOPLE?: NOT DIFFICULT AT ALL
2. NOT BEING ABLE TO STOP OR CONTROL WORRYING: NOT AT ALL
GAD7 TOTAL SCORE: 0

## 2021-10-08 ASSESSMENT — PATIENT HEALTH QUESTIONNAIRE - PHQ9
10. IF YOU CHECKED OFF ANY PROBLEMS, HOW DIFFICULT HAVE THESE PROBLEMS MADE IT FOR YOU TO DO YOUR WORK, TAKE CARE OF THINGS AT HOME, OR GET ALONG WITH OTHER PEOPLE: NOT DIFFICULT AT ALL
SUM OF ALL RESPONSES TO PHQ QUESTIONS 1-9: 0
SUM OF ALL RESPONSES TO PHQ QUESTIONS 1-9: 0

## 2021-10-08 NOTE — PATIENT INSTRUCTIONS
Continue current dose of Zoloft 25 mg daily for another two to four weeks  Message me with an update on symptoms in 2 to 4 weeks. If symptoms are controlled then continue Zoloft 25 mg daily. If symptoms are not controlled then increase Zoloft to 50 mg daily.     Right shoulder pain -   Comment: Please be aware that coverage of these services is subject to the terms and limitations of your health insurance plan.  Call member services at your health plan with any benefit or coverage questions.   [facility]  will call you to coordinate your care as prescribed by your provider. A representative will call you within 1 business day to help schedule your appointment, or you may contact the  Representative at: [phone]

## 2021-10-08 NOTE — TELEPHONE ENCOUNTER
DIAGNOSIS: Acute pain of right shoulder/ Dr Hickman/ no images   APPOINTMENT DATE: 10.14.21   NOTES STATUS DETAILS   OFFICE NOTE from referring provider Internal 10.8.21 Dr Dominic Hickman [facility]  8.13.21   OFFICE NOTE from other specialist Internal 2.18.19 Dr Chris Edwards [facility]   DISCHARGE SUMMARY from hospital N/A    DISCHARGE REPORT from the ER N/A    OPERATIVE REPORT N/A    EMG report N/A    MEDICATION LIST Internal    MRI N/A 2.21.19 R shoulder (NIL)   DEXA (osteoporosis/bone health) N/A    CT SCAN N/A    XRAYS (IMAGES & REPORTS) N/A

## 2021-10-08 NOTE — PROGRESS NOTES
Assessment & Plan       1. Episode of recurrent major depressive disorder, unspecified depression episode severity (H)  - symptoms improving not completely controlled   - pt hesitant about increasing dose from 25 mg daily to 50 mg daily   - advised below  Continue current dose of Zoloft 25 mg daily for another two to four weeks  Message me with an update on symptoms in 2 to 4 weeks. If symptoms are controlled then continue Zoloft 25 mg daily. If symptoms are not controlled then increase Zoloft to 50 mg daily.   - sertraline (ZOLOFT) 25 MG tablet; Take 1 tablet (25 mg) by mouth daily  Dispense: 30 tablet; Refill: 0    2. Acute pain of right shoulder  - given ortho referral     3. Need for prophylactic vaccination and inoculation against influenza  - INFLUENZA QUAD, RECOMBINANT, P-FREE (RIV4) (FLUBLOK)        Dominic Hickman MD  [facility]    Sandra Liriano is a 56 year old who presents for the following health issues     HPI     Depression Followup  She is sleeping well.   Counseling is going well.   Notes mood is better. She is in office three times/week which is helping with socializing.     Social History     Tobacco Use     Smoking status: Never Smoker     Smokeless tobacco: Never Used   Substance Use Topics     Alcohol use: None     Drug use: None     PHQ 10/10/2019 8/13/2021 10/8/2021   PHQ-9 Total Score 4 3 0   Q9: Thoughts of better off dead/self-harm past 2 weeks Not at all Not at all Not at all     PILAR-7 SCORE 10/10/2019 8/13/2021 10/8/2021   Total Score - - 0 (minimal anxiety)   Total Score 4 4 0           Review of Systems         Objective    BP 90/63   Pulse 78   Temp 98.3  F (36.8  C)   Wt 56.2 kg (124 lb)   SpO2 98%   BMI 23.62 kg/m    Body mass index is 23.62 kg/m .  Physical Exam

## 2021-10-09 ASSESSMENT — ANXIETY QUESTIONNAIRES: GAD7 TOTAL SCORE: 0

## 2021-10-09 ASSESSMENT — PATIENT HEALTH QUESTIONNAIRE - PHQ9: SUM OF ALL RESPONSES TO PHQ QUESTIONS 1-9: 0

## 2021-10-10 ENCOUNTER — HEALTH MAINTENANCE LETTER (OUTPATIENT)
Age: 56
End: 2021-10-10

## 2021-10-12 DIAGNOSIS — M25.511 ACUTE PAIN OF RIGHT SHOULDER: Primary | ICD-10-CM

## 2021-10-13 NOTE — PROGRESS NOTES
[facility]  Sports Medicine Clinic  [facility]           SUBJECTIVE       Deandra Colón is a 56 year old female presenting to clinic today with concerns for right shoulder pain. Pt notes have previous adhesive capsulitis and that this feels similar. Pt also notes she is having bicep spasms that cause tingling into her dorsal thumb.    Background:   Date of injury: No specific injury  Duration of symptoms: 7 months  Mechanism of Injury: N/A  Intensity: 9/10   Aggravating factors: Working on the computer   Relieving Factors: Rest   Prior Evaluation: 10/2019, MR showed adhesive capsulitis. Visit at [facility], started PT.       PMH, Medications and Allergies were reviewed and updated as needed.    ROS:  As noted above otherwise negative.    Patient Active Problem List   Diagnosis     Right shoulder pain     Adhesive capsulitis of right shoulder     Biceps tendinitis of right upper extremity     Glaucoma suspect     Major depression, single episode     Musculoskeletal disorder     Pinguecula     Posttraumatic stress disorder     Right sided temporal headache     Special screening for malignant neoplasms, colon     Uterine leiomyoma       Current Outpatient Medications   Medication Sig Dispense Refill     naproxen (NAPROSYN) 500 MG tablet Take 1 tablet (500 mg) by mouth 2 times daily (with meals) for 28 days 56 tablet 0     ibuprofen (ADVIL,MOTRIN) 600 MG tablet [IBUPROFEN (ADVIL,MOTRIN) 600 MG TABLET] Take 1 tablet (600 mg total) by mouth every 6 (six) hours as needed for pain. 60 tablet 1     multivitamin therapeutic (THERAGRAN) tablet [MULTIVITAMIN THERAPEUTIC (THERAGRAN) TABLET] Take 1 tablet by mouth daily.       omega-3/dha/epa/fish oil (FISH OIL-OMEGA-3 FATTY ACIDS) 300-1,000 mg capsule [OMEGA-3/DHA/EPA/FISH OIL (FISH OIL-OMEGA-3 FATTY ACIDS) 300-1,000 MG CAPSULE] Take 2 g by mouth daily. (Patient not taking: Reported on 8/13/2021)       sertraline (ZOLOFT) 25 MG tablet Take 1 tablet  (25 mg) by mouth daily 30 tablet 0            OBJECTIVE:       Vitals:   Vitals:    10/14/21 1030   Weight: 56.2 kg (124 lb)     BMI: Body mass index is 23.62 kg/m .    Gen:  Well nourished and in no acute distress  HEENT: Extraocular movement intact  Neck: Supple  Pulm:  Breathing Comfortably. No increased respiratory effort.  Psych: Euthymic. Appropriately answers questions    MSK: Right shoulder with full range of motion when compared to the left.  No asymmetry noted.  No bruising or edema noted.  No tenderness to palpation throughout.  Rotator cuff strength full and intact 5/5.  Provocative testing was positive for external rotation resistance pain posteriorly as well as a positive Pickett's posteriorly.      XRAY : Mild degenerative changes about the glenohumeral joint.  No other osseous abnormalities noted.          ASSESSMENT and PLAN:     Deandra was seen today for pain.    Diagnoses and all orders for this visit:    Chronic right shoulder pain  -     PHYSICAL THERAPY REFERRAL (Internal); Future  -     naproxen (NAPROSYN) 500 MG tablet; Take 1 tablet (500 mg) by mouth 2 times daily (with meals) for 28 days    Deandra is a 56-year-old very nice female presenting to clinic today with concerns for repeat adhesive capsulitis.  Her exam, if anything for adhesive capsulitis she would be in phase 1, the freezing phase.  I do not think this is her actual diagnosis today.  It seems as though she has rotator cuff pathology/tendinopathy of the infraspinatus as well as possible labral pathology.  Both of these are likely degenerative.    Plan: I have discussed options for treatment with Deandra.  As of now, for her pain, due to the multiple concerns we will treat with oral NSAIDs.  I have sent naproxen 500 mg twice daily to be taken with food, to her pharmacy.  Long-term beneficial prognosis will be yielded with physical therapy.  Order has been placed.  Deandra should follow-up in 4 to 6 weeks to assess her progress.  If  no resolution or her pain persist, subacromial corticosteroid injection could be considered.  Following that, ultrasound-guided glenohumeral injection could be considered as well.      Options for treatment and/or follow-up care were reviewed with the patient was actively involved in the decision making process. Patient verbalized understanding and was in agreement with the plan.    Noé Haywood DO  , Sports Medicine  Department of Family [facility] and [facility]

## 2021-10-14 ENCOUNTER — OFFICE VISIT (OUTPATIENT)
Dept: ORTHOPEDICS | Facility: CLINIC | Age: 56
End: 2021-10-14
Payer: COMMERCIAL

## 2021-10-14 ENCOUNTER — ANCILLARY PROCEDURE (OUTPATIENT)
Dept: GENERAL RADIOLOGY | Facility: CLINIC | Age: 56
End: 2021-10-14
Attending: STUDENT IN AN ORGANIZED HEALTH CARE EDUCATION/TRAINING PROGRAM
Payer: COMMERCIAL

## 2021-10-14 ENCOUNTER — PRE VISIT (OUTPATIENT)
Dept: ORTHOPEDICS | Facility: CLINIC | Age: 56
End: 2021-10-14

## 2021-10-14 VITALS — WEIGHT: 124 LBS | BODY MASS INDEX: 23.62 KG/M2

## 2021-10-14 DIAGNOSIS — G89.29 CHRONIC RIGHT SHOULDER PAIN: Primary | ICD-10-CM

## 2021-10-14 DIAGNOSIS — M25.511 ACUTE PAIN OF RIGHT SHOULDER: ICD-10-CM

## 2021-10-14 DIAGNOSIS — M25.511 CHRONIC RIGHT SHOULDER PAIN: Primary | ICD-10-CM

## 2021-10-14 PROCEDURE — 73030 X-RAY EXAM OF SHOULDER: CPT | Mod: RT | Performed by: RADIOLOGY

## 2021-10-14 PROCEDURE — 99204 OFFICE O/P NEW MOD 45 MIN: CPT | Performed by: STUDENT IN AN ORGANIZED HEALTH CARE EDUCATION/TRAINING PROGRAM

## 2021-10-14 RX ORDER — NAPROXEN 500 MG/1
500 TABLET ORAL 2 TIMES DAILY WITH MEALS
Qty: 56 TABLET | Refills: 0 | Status: SHIPPED | OUTPATIENT
Start: 2021-10-14 | End: 2021-11-17

## 2021-10-14 NOTE — LETTER
10/14/2021      RE: Deandra Colón  [address]  [place] [address]       [facility]  Sports Medicine Clinic  Clinics and Surgery Center           SUBJECTIVE       Deandra Colón is a 56 year old female presenting to clinic today with concerns for right shoulder pain. Pt notes have previous adhesive capsulitis and that this feels similar. Pt also notes she is having bicep spasms that cause tingling into her dorsal thumb.    Background:   Date of injury: No specific injury  Duration of symptoms: 7 months  Mechanism of Injury: N/A  Intensity: 9/10   Aggravating factors: Working on the computer   Relieving Factors: Rest   Prior Evaluation: 10/2019, MR showed adhesive capsulitis. Visit at [facility], started PT.       PMH, Medications and Allergies were reviewed and updated as needed.    ROS:  As noted above otherwise negative.    Patient Active Problem List   Diagnosis     Right shoulder pain     Adhesive capsulitis of right shoulder     Biceps tendinitis of right upper extremity     Glaucoma suspect     Major depression, single episode     Musculoskeletal disorder     Pinguecula     Posttraumatic stress disorder     Right sided temporal headache     Special screening for malignant neoplasms, colon     Uterine leiomyoma       Current Outpatient Medications   Medication Sig Dispense Refill     naproxen (NAPROSYN) 500 MG tablet Take 1 tablet (500 mg) by mouth 2 times daily (with meals) for 28 days 56 tablet 0     ibuprofen (ADVIL,MOTRIN) 600 MG tablet [IBUPROFEN (ADVIL,MOTRIN) 600 MG TABLET] Take 1 tablet (600 mg total) by mouth every 6 (six) hours as needed for pain. 60 tablet 1     multivitamin therapeutic (THERAGRAN) tablet [MULTIVITAMIN THERAPEUTIC (THERAGRAN) TABLET] Take 1 tablet by mouth daily.       omega-3/dha/epa/fish oil (FISH OIL-OMEGA-3 FATTY ACIDS) 300-1,000 mg capsule [OMEGA-3/DHA/EPA/FISH OIL (FISH OIL-OMEGA-3 FATTY ACIDS) 300-1,000 MG CAPSULE] Take 2 g by mouth daily. (Patient  not taking: Reported on 8/13/2021)       sertraline (ZOLOFT) 25 MG tablet Take 1 tablet (25 mg) by mouth daily 30 tablet 0            OBJECTIVE:       Vitals:   Vitals:    10/14/21 1030   Weight: 56.2 kg (124 lb)     BMI: Body mass index is 23.62 kg/m .    Gen:  Well nourished and in no acute distress  HEENT: Extraocular movement intact  Neck: Supple  Pulm:  Breathing Comfortably. No increased respiratory effort.  Psych: Euthymic. Appropriately answers questions    MSK: Right shoulder with full range of motion when compared to the left.  No asymmetry noted.  No bruising or edema noted.  No tenderness to palpation throughout.  Rotator cuff strength full and intact 5/5.  Provocative testing was positive for external rotation resistance pain posteriorly as well as a positive Bureau's posteriorly.      XRAY : Mild degenerative changes about the glenohumeral joint.  No other osseous abnormalities noted.          ASSESSMENT and PLAN:     Deandra was seen today for pain.    Diagnoses and all orders for this visit:    Chronic right shoulder pain  -     PHYSICAL THERAPY REFERRAL (Internal); Future  -     naproxen (NAPROSYN) 500 MG tablet; Take 1 tablet (500 mg) by mouth 2 times daily (with meals) for 28 days    Deandra is a 56-year-old very nice female presenting to clinic today with concerns for repeat adhesive capsulitis.  Her exam, if anything for adhesive capsulitis she would be in phase 1, the freezing phase.  I do not think this is her actual diagnosis today.  It seems as though she has rotator cuff pathology/tendinopathy of the infraspinatus as well as possible labral pathology.  Both of these are likely degenerative.    Plan: I have discussed options for treatment with Deandra.  As of now, for her pain, due to the multiple concerns we will treat with oral NSAIDs.  I have sent naproxen 500 mg twice daily to be taken with food, to her pharmacy.  Long-term beneficial prognosis will be yielded with physical therapy.  Order  has been placed.  Deandra should follow-up in 4 to 6 weeks to assess her progress.  If no resolution or her pain persist, subacromial corticosteroid injection could be considered.  Following that, ultrasound-guided glenohumeral injection could be considered as well.      Options for treatment and/or follow-up care were reviewed with the patient was actively involved in the decision making process. Patient verbalized understanding and was in agreement with the plan.    Noé Haywood DO  , Sports Medicine  Department of Family Medicine and [facility]

## 2021-10-15 ENCOUNTER — THERAPY VISIT (OUTPATIENT)
Dept: PHYSICAL THERAPY | Facility: CLINIC | Age: 56
End: 2021-10-15
Attending: STUDENT IN AN ORGANIZED HEALTH CARE EDUCATION/TRAINING PROGRAM
Payer: COMMERCIAL

## 2021-10-15 DIAGNOSIS — M25.511 CHRONIC RIGHT SHOULDER PAIN: ICD-10-CM

## 2021-10-15 DIAGNOSIS — G89.29 CHRONIC RIGHT SHOULDER PAIN: ICD-10-CM

## 2021-10-15 PROCEDURE — 97161 PT EVAL LOW COMPLEX 20 MIN: CPT | Mod: GP | Performed by: PHYSICAL THERAPIST

## 2021-10-15 PROCEDURE — 97110 THERAPEUTIC EXERCISES: CPT | Mod: GP | Performed by: PHYSICAL THERAPIST

## 2021-10-15 NOTE — PROGRESS NOTES
Physical Therapy Initial Evaluation  Subjective:  The history is provided by the patient. No  was used.   Patient Health History  Deandra Colón being seen for shoulder pain, march/april 2021.     Date of Onset: 10/14/21 date of order.      Pain is reported as 9/10 on pain scale.  General health as reported by patient is excellent.  Pertinent medical history includes: none.   Red flags:  None as reported by patient.  Medical allergies: none.       Current medications:  Anti-depressants and anti-inflammatory.    Current occupation is Administration.   Primary job tasks include:  Computer work.                  Therapist Generated HPI Evaluation  Problem details: The pt notes that she has been having posterior right shoulder pain since March of this year. She had frozen shoulder in 2019. About a week ago she noticed more cramping in her bicep and shooting pain.    She has taken off the past 3 days of computer work due to her right shoulder pain and that has improved her posterior right shoulder pain, but not her cramping/tingling pain in her right bicep.    The pt is right handed. She lives alone, doing all of her chores at home.    Activity: walking, ebony    Goals: stop pain.         Type of problem:  Right shoulder.    This is a new condition.  Condition occurred with:  Unknown cause.  Where condition occurred: for unknown reasons.  Patient reports pain:  Posterior and lateral.  Pain is described as cramping and aching and is constant.  Pain radiates to:  Upper arm and shoulder (cramping/tingling). Pain is worse in the P.M..  Since onset symptoms are gradually worsening.  Associated symptoms:  Tingling. Symptoms are exacerbated by lifting and using arm at shoulder level  and relieved by rest.  Special tests included:  X-ray and MRI.    Restrictions due to condition include:  Working in normal job without restrictions.  Barriers include:  None as reported by patient.                         Objective:  Standing Alignment:    Cervical/Thoracic:  Forward head                                    Cervical/Thoracic Evaluation  Cervical AROM: normal         Headaches: none          Cervical Palpation:    Tenderness present at Left:    Suboccipitals  Tenderness present at Right:    Suboccipitals    Cervical Stability/Joint Clearing:      Right positive at:  1st Rib           Shoulder Evaluation:  ROM:  AROM:  normal                                  Strength:  : strength: external rotation: R: 4-/5 pain, L: 5/5.                        Special Tests:  Special tests assessed shoulder: positive o'mamie's on R side.      Palpation:      Right shoulder tenderness present at: Levator and Upper Trap  Right shoulder tenderness not present at:Biceps; Supraspinatus or Infraspinatus                                     General     ROS    Assessment/Plan:    Patient is a 56 year old female with right side shoulder complaints.    Patient has the following significant findings with corresponding treatment plan.                Diagnosis 1:  R shoulder pain  Pain -  hot/cold therapy, US, electric stimulation, mechanical traction, manual therapy, STS, splint/taping/bracing/orthotics, self management, education, directional preference exercise and home program  Decreased ROM/flexibility - manual therapy, therapeutic exercise, therapeutic activity and home program  Decreased strength - therapeutic exercise, therapeutic activities and home program  Impaired posture - neuro re-education, therapeutic activities and home program    Therapy Evaluation Codes:   Cumulative Therapy Evaluation is: Low complexity.    Previous and current functional limitations:  (See Goal Flow Sheet for this information)    Short term and Long term goals: (See Goal Flow Sheet for this information)     Communication ability:  Patient appears to be able to clearly communicate and understand verbal and written communication and follow directions  correctly.  Treatment Explanation - The following has been discussed with the patient:   RX ordered/plan of care  Anticipated outcomes  Possible risks and side effects  This patient would benefit from PT intervention to resume normal activities.   Rehab potential is good.    Frequency:  1 X week, once daily  Duration:  for 8 weeks  Discharge Plan:  Achieve all LTG.  Independent in home treatment program.  Reach maximal therapeutic benefit.    Please refer to the daily flowsheet for treatment today, total treatment time and time spent performing 1:1 timed codes.

## 2021-10-19 ENCOUNTER — THERAPY VISIT (OUTPATIENT)
Dept: PHYSICAL THERAPY | Facility: CLINIC | Age: 56
End: 2021-10-19
Attending: STUDENT IN AN ORGANIZED HEALTH CARE EDUCATION/TRAINING PROGRAM
Payer: COMMERCIAL

## 2021-10-19 DIAGNOSIS — M25.511 ACUTE PAIN OF RIGHT SHOULDER: ICD-10-CM

## 2021-10-19 PROCEDURE — 97530 THERAPEUTIC ACTIVITIES: CPT | Mod: GP | Performed by: PHYSICAL THERAPIST

## 2021-10-19 PROCEDURE — 97110 THERAPEUTIC EXERCISES: CPT | Mod: GP | Performed by: PHYSICAL THERAPIST

## 2021-10-21 ENCOUNTER — VIRTUAL VISIT (OUTPATIENT)
Dept: PSYCHOLOGY | Facility: CLINIC | Age: 56
End: 2021-10-21
Payer: COMMERCIAL

## 2021-10-21 DIAGNOSIS — F33.1 MODERATE EPISODE OF RECURRENT MAJOR DEPRESSIVE DISORDER (H): Primary | ICD-10-CM

## 2021-10-21 PROCEDURE — 90834 PSYTX W PT 45 MINUTES: CPT | Mod: GT | Performed by: MARRIAGE & FAMILY THERAPIST

## 2021-10-26 NOTE — PROGRESS NOTES
Progress Note    Patient Name: Deandra Colón  Date: 10/21/2021         Service Type: Individual      Session Start Time: 5:02PM  Session End Time: 5:47PM     Session Length: 45    Session #: 3    Attendees: Client attended alone    Service Modality:  Video Visit:      Provider verified identity through the following two step process.  Patient provided:  Patient photo    Telemedicine Visit: The patient's condition can be safely assessed and treated via synchronous audio and visual telemedicine encounter.      Reason for Telemedicine Visit: Patient has requested telehealth visit    Originating Site (Patient Location): Patient's home    Distant Site (Provider Location): Provider Remote Setting- Home Office    Consent:  The patient/guardian has verbally consented to: the potential risks and benefits of telemedicine (video visit) versus in person care; bill my insurance or make self-payment for services provided; and responsibility for payment of non-covered services.     Patient would like the video invitation sent by:  My Chart    Mode of Communication:  Video Conference via [employer]    As the provider I attest to compliance with applicable laws and regulations related to telemedicine.     Treatment Plan Last Reviewed: Next session  PHQ-9 / PILAR-7 : Each session    DATA  Interactive Complexity: No  Crisis: No       Progress Since Last Session (Related to Symptoms / Goals / Homework):   Symptoms: No change ongoing stress    Homework: Partially completed      Episode of Care Goals: Satisfactory progress - ACTION (Actively working towards change); Intervened by reinforcing change plan / affirming steps taken     Current / Ongoing Stressors and Concerns:   Client thinks the low dose of zoloft is helping. She says she feels better. Wants to stay at the current dose. Is also sleeping better. Client remains uncertain of whether she wants to move.      Treatment Objective(s) Addressed  in This Session:   Increase interest, engagement, and pleasure in doing things     Intervention:   CBT: mindfulness practices        ASSESSMENT: Current Emotional / Mental Status (status of significant symptoms):   Risk status (Self / Other harm or suicidal ideation)   Patient denies current fears or concerns for personal safety.   Patient denies current or recent suicidal ideation or behaviors.   Patient denies current or recent homicidal ideation or behaviors.   Patient denies current or recent self injurious behavior or ideation.   Patient denies other safety concerns.   Patient reports there has been no change in risk factors since their last session.     Patient reports there has been no change in protective factors since their last session.     Recommended that patient call 911 or go to the local ED should there be a change in any of these risk factors.     Appearance:   Appropriate    Eye Contact:   Good    Psychomotor Behavior: Normal    Attitude:   Cooperative  Friendly Pleasant   Orientation:   All   Speech    Rate / Production: Normal     Volume:  Normal    Mood:    Anxious  Normal   Affect:    Worrisome    Thought Content:  Clear    Thought Form:  Coherent  Logical    Insight:    Good      Medication Review:   No changes to current psychiatric medication(s)     Medication Compliance:   Yes     Changes in Health Issues:   None reported     Chemical Use Review:   Substance Use: Chemical use reviewed, no active concerns identified      Tobacco Use: No current tobacco use.      Diagnosis:  1. Moderate episode of recurrent major depressive disorder (H)        Collateral Reports Completed:   Not Applicable    PLAN: (Patient Tasks / Therapist Tasks / Other)  Homework: Client to continue to weigh pros and cons of moving.      INOCENCIA Mondragon  October 21, 2021                                                         ______________________________________________________________________                                                  Treatment Plan    Client's Name: Deandra Colón  YOB: 1965    Date: 10/21/2021    DSM-V Diagnoses: 296.31 - Major Depressive Disorder, Recurrent, Mild By History; V71.09 - No Diagnosis  Psychosocial / Contextual Factors: Limited social support  WHODAS: See Epic    Referral / Collaboration:  Referral to another professional/service is not indicated at this time..    Anticipated number of session or this episode of care: 10      MeasurableTreatment Goal(s) related to diagnosis / functional impairment(s)  Goal 1: Client will lower PHQ9 score to 3 or below.    I will know I've met my goal when I'm less depressed.      Objective #A (Client Action)    Client will Increase interest, engagement, and pleasure in doing things.  Status: New - Date: 10/21/2021     Intervention(s)  Therapist will assign homework to increase activity level.    Objective #B  Client will Feel less tired and more energy during the day .  Status: New - Date: 10/21/2021     Intervention(s)  Therapist will assign homework work on sleep hygiene.    Objective #C  Client will Identify negative self-talk and behaviors: challenge core beliefs, myths, and actions.  Status: New - Date: 10/21/2021     Intervention(s)  Therapist will teach emotional recognition/identification. and improve self talk.      Patient has reviewed and agreed to the above plan.      Elizabeth Ivy  October 21, 2021

## 2021-11-11 ENCOUNTER — OFFICE VISIT (OUTPATIENT)
Dept: ORTHOPEDICS | Facility: CLINIC | Age: 56
End: 2021-11-11
Payer: COMMERCIAL

## 2021-11-11 VITALS — WEIGHT: 124 LBS | BODY MASS INDEX: 23.62 KG/M2

## 2021-11-11 DIAGNOSIS — M24.111 LABRAL TEAR OF SHOULDER, DEGENERATIVE, RIGHT: Primary | ICD-10-CM

## 2021-11-11 PROCEDURE — 99214 OFFICE O/P EST MOD 30 MIN: CPT | Performed by: STUDENT IN AN ORGANIZED HEALTH CARE EDUCATION/TRAINING PROGRAM

## 2021-11-11 NOTE — PROGRESS NOTES
[facility]  Sports Medicine Clinic  [facility]           SUBJECTIVE       Deandra Colón is a 56 year old female presenting to clinic today as a f/u for her right shouolder pain.    10/14/21: Chronic right shoulder pain  -     PHYSICAL THERAPY REFERRAL (Internal); Future  -     naproxen (NAPROSYN) 500 MG tablet; Take 1 tablet (500 mg) by mouth 2 times daily (with meals) for 28 days     Deandra is a 56-year-old very nice female presenting to clinic today with concerns for repeat adhesive capsulitis.  Her exam, if anything for adhesive capsulitis she would be in phase 1, the freezing phase.  I do not think this is her actual diagnosis today.  It seems as though she has rotator cuff pathology/tendinopathy of the infraspinatus as well as possible labral pathology.  Both of these are likely degenerative.     Plan: I have discussed options for treatment with Deandra.  As of now, for her pain, due to the multiple concerns we will treat with oral NSAIDs.  I have sent naproxen 500 mg twice daily to be taken with food, to her pharmacy.  Long-term beneficial prognosis will be yielded with physical therapy.  Order has been placed.  Deandra should follow-up in 4 to 6 weeks to assess her progress.  If no resolution or her pain persist, subacromial corticosteroid injection could be considered.  Following that, ultrasound-guided glenohumeral injection could be considered as well.      Interval hx: Patient has not done an effective amount of physical therapy.  She still has some stiffness and pain in the shoulder which now radiates down the back portion of the arm and into the area of the dorsum of the hand.  Anti-inflammatories have been helpful with getting rid of the pain.    PMH, Medications and Allergies were reviewed and updated as needed.    ROS:  As noted above otherwise negative.    Patient Active Problem List   Diagnosis     Right shoulder pain     Adhesive capsulitis of right shoulder     Biceps  tendinitis of right upper extremity     Glaucoma suspect     Major depression, single episode     Musculoskeletal disorder     Pinguecula     Posttraumatic stress disorder     Right sided temporal headache     Special screening for malignant neoplasms, colon     Uterine leiomyoma       Current Outpatient Medications   Medication Sig Dispense Refill     ibuprofen (ADVIL,MOTRIN) 600 MG tablet [IBUPROFEN (ADVIL,MOTRIN) 600 MG TABLET] Take 1 tablet (600 mg total) by mouth every 6 (six) hours as needed for pain. 60 tablet 1     multivitamin therapeutic (THERAGRAN) tablet [MULTIVITAMIN THERAPEUTIC (THERAGRAN) TABLET] Take 1 tablet by mouth daily.       naproxen (NAPROSYN) 500 MG tablet Take 1 tablet (500 mg) by mouth 2 times daily (with meals) for 28 days 56 tablet 0     sertraline (ZOLOFT) 25 MG tablet TAKE 1 TABLET (25 MG) BY MOUTH DAILY 30 tablet 5     omega-3/dha/epa/fish oil (FISH OIL-OMEGA-3 FATTY ACIDS) 300-1,000 mg capsule [OMEGA-3/DHA/EPA/FISH OIL (FISH OIL-OMEGA-3 FATTY ACIDS) 300-1,000 MG CAPSULE] Take 2 g by mouth daily. (Patient not taking: Reported on 8/13/2021)              OBJECTIVE:       Vitals:   Vitals:    11/11/21 0901   Weight: 56.2 kg (124 lb)     BMI: Body mass index is 23.62 kg/m .    Gen:  Well nourished and in no acute distress  HEENT: Extraocular movement intact  Neck: Supple  Pulm:  Breathing Comfortably. No increased respiratory effort.  Psych: Euthymic. Appropriately answers questions    MSK: Range of motion of the right shoulder flexion 110 degrees.  Abduction to roughly 90.  External rotation on the right to 35 degrees (45 degrees on the left.  No noticeable tenderness to palpation anteriorly, mild tenderness to palpation of the posterior joint line.  Rotator cuff strength mildly diminished in supraspinatus and infraspinatus to 4/5.  [place]'s testing positive.  Negative [place]'s.              ASSESSMENT and PLAN:     Deandra was seen today for recheck.    Diagnoses and all orders for  this visit:    Labral tear of shoulder, degenerative, right  -     XR SHOULDER CONTRAST CT/MR INJECTION ; Future  -     MR SHOULDER ARTHROGRAM  RIGHT W CONTRAST; Future    56-year-old female presenting to clinic today for follow-up of her right shoulder pain.  Patient has not had a successful bout of physical therapy as she has only gone twice and has not done any home therapy.  Has found some relief with the oral anti-inflammatories.  Given the amount of radicular symptoms she is having as well as her positive [place]'s test, I would be concerned for degenerative labral tear that may have formed a paralabral cyst compressing on the nerve.  Her neck examination was overall normal without any particular radicular symptoms coming from the cervical spine.    Plan: Given the above, and reviewing the chart with the patient and examining her previous MRI from 2019, we have elected to proceed going forward with repeating an MRI, arthrogram of the right shoulder, for further delineation.  I think the patient would benefit from actually doing physical therapy for the rotator cuff/adhesive capsulitis, to which we talked about at length.  I would like the patient to return to clinic virtually after her MRI has been obtained to discuss further findings and management.  Patient has had a few injections into the shoulder previously that were helpful, this could be of benefit depending on what we find on her MRI.      Options for treatment and/or follow-up care were reviewed with the patient was actively involved in the decision making process. Patient verbalized understanding and was in agreement with the plan.    Noé Haywood DO  , Sports Medicine  Department of Family [facility] and [facility]

## 2021-11-11 NOTE — LETTER
11/11/2021      RE: Deandra Colón  [address]  [place] [address]       [facility]  Sports Medicine Clinic  Clinics and Surgery Center           SUBJECTIVE       Deandra Colón is a 56 year old female presenting to clinic today as a f/u for her right shouolder pain.    10/14/21: Chronic right shoulder pain  -     PHYSICAL THERAPY REFERRAL (Internal); Future  -     naproxen (NAPROSYN) 500 MG tablet; Take 1 tablet (500 mg) by mouth 2 times daily (with meals) for 28 days     Deandra is a 56-year-old very nice female presenting to clinic today with concerns for repeat adhesive capsulitis.  Her exam, if anything for adhesive capsulitis she would be in phase 1, the freezing phase.  I do not think this is her actual diagnosis today.  It seems as though she has rotator cuff pathology/tendinopathy of the infraspinatus as well as possible labral pathology.  Both of these are likely degenerative.     Plan: I have discussed options for treatment with Deandra.  As of now, for her pain, due to the multiple concerns we will treat with oral NSAIDs.  I have sent naproxen 500 mg twice daily to be taken with food, to her pharmacy.  Long-term beneficial prognosis will be yielded with physical therapy.  Order has been placed.  Deandra should follow-up in 4 to 6 weeks to assess her progress.  If no resolution or her pain persist, subacromial corticosteroid injection could be considered.  Following that, ultrasound-guided glenohumeral injection could be considered as well.      Interval hx: Patient has not done an effective amount of physical therapy.  She still has some stiffness and pain in the shoulder which now radiates down the back portion of the arm and into the area of the dorsum of the hand.  Anti-inflammatories have been helpful with getting rid of the pain.    PMH, Medications and Allergies were reviewed and updated as needed.    ROS:  As noted above otherwise negative.    Patient Active Problem List    Diagnosis     Right shoulder pain     Adhesive capsulitis of right shoulder     Biceps tendinitis of right upper extremity     Glaucoma suspect     Major depression, single episode     Musculoskeletal disorder     Pinguecula     Posttraumatic stress disorder     Right sided temporal headache     Special screening for malignant neoplasms, colon     Uterine leiomyoma       Current Outpatient Medications   Medication Sig Dispense Refill     ibuprofen (ADVIL,MOTRIN) 600 MG tablet [IBUPROFEN (ADVIL,MOTRIN) 600 MG TABLET] Take 1 tablet (600 mg total) by mouth every 6 (six) hours as needed for pain. 60 tablet 1     multivitamin therapeutic (THERAGRAN) tablet [MULTIVITAMIN THERAPEUTIC (THERAGRAN) TABLET] Take 1 tablet by mouth daily.       naproxen (NAPROSYN) 500 MG tablet Take 1 tablet (500 mg) by mouth 2 times daily (with meals) for 28 days 56 tablet 0     sertraline (ZOLOFT) 25 MG tablet TAKE 1 TABLET (25 MG) BY MOUTH DAILY 30 tablet 5     omega-3/dha/epa/fish oil (FISH OIL-OMEGA-3 FATTY ACIDS) 300-1,000 mg capsule [OMEGA-3/DHA/EPA/FISH OIL (FISH OIL-OMEGA-3 FATTY ACIDS) 300-1,000 MG CAPSULE] Take 2 g by mouth daily. (Patient not taking: Reported on 8/13/2021)              OBJECTIVE:       Vitals:   Vitals:    11/11/21 0901   Weight: 56.2 kg (124 lb)     BMI: Body mass index is 23.62 kg/m .    Gen:  Well nourished and in no acute distress  HEENT: Extraocular movement intact  Neck: Supple  Pulm:  Breathing Comfortably. No increased respiratory effort.  Psych: Euthymic. Appropriately answers questions    MSK: Range of motion of the right shoulder flexion 110 degrees.  Abduction to roughly 90.  External rotation on the right to 35 degrees (45 degrees on the left.  No noticeable tenderness to palpation anteriorly, mild tenderness to palpation of the posterior joint line.  Rotator cuff strength mildly diminished in supraspinatus and infraspinatus to 4/5.  [place]'s testing positive.  Negative Spurling's.               ASSESSMENT and PLAN:     Deandra was seen today for recheck.    Diagnoses and all orders for this visit:    Labral tear of shoulder, degenerative, right  -     XR SHOULDER CONTRAST CT/MR INJECTION ; Future  -     MR SHOULDER ARTHROGRAM  RIGHT W CONTRAST; Future    56-year-old female presenting to clinic today for follow-up of her right shoulder pain.  Patient has not had a successful bout of physical therapy as she has only gone twice and has not done any home therapy.  Has found some relief with the oral anti-inflammatories.  Given the amount of radicular symptoms she is having as well as her positive [place]'s test, I would be concerned for degenerative labral tear that may have formed a paralabral cyst compressing on the nerve.  Her neck examination was overall normal without any particular radicular symptoms coming from the cervical spine.    Plan: Given the above, and reviewing the chart with the patient and examining her previous MRI from 2019, we have elected to proceed going forward with repeating an MRI, arthrogram of the right shoulder, for further delineation.  I think the patient would benefit from actually doing physical therapy for the rotator cuff/adhesive capsulitis, to which we talked about at length.  I would like the patient to return to clinic virtually after her MRI has been obtained to discuss further findings and management.  Patient has had a few injections into the shoulder previously that were helpful, this could be of benefit depending on what we find on her MRI.      Options for treatment and/or follow-up care were reviewed with the patient was actively involved in the decision making process. Patient verbalized understanding and was in agreement with the plan.    Noé Haywood DO  , Sports Medicine  Department of Family Medicine and [facility]

## 2021-11-12 DIAGNOSIS — Z11.59 ENCOUNTER FOR SCREENING FOR OTHER VIRAL DISEASES: ICD-10-CM

## 2021-11-15 ENCOUNTER — THERAPY VISIT (OUTPATIENT)
Dept: PHYSICAL THERAPY | Facility: CLINIC | Age: 56
End: 2021-11-15
Payer: COMMERCIAL

## 2021-11-15 DIAGNOSIS — G89.29 CHRONIC RIGHT SHOULDER PAIN: Primary | ICD-10-CM

## 2021-11-15 DIAGNOSIS — M25.511 CHRONIC RIGHT SHOULDER PAIN: Primary | ICD-10-CM

## 2021-11-15 PROCEDURE — 97110 THERAPEUTIC EXERCISES: CPT | Mod: GP

## 2021-11-16 DIAGNOSIS — G89.29 CHRONIC RIGHT SHOULDER PAIN: ICD-10-CM

## 2021-11-16 DIAGNOSIS — M25.511 CHRONIC RIGHT SHOULDER PAIN: ICD-10-CM

## 2021-11-17 RX ORDER — NAPROXEN 500 MG/1
500 TABLET ORAL 2 TIMES DAILY WITH MEALS
Qty: 60 TABLET | Refills: 0 | Status: SHIPPED | OUTPATIENT
Start: 2021-11-17 | End: 2021-12-17

## 2021-11-17 NOTE — TELEPHONE ENCOUNTER
naproxen (NAPROSYN) 500 MG tablet  Last Written Prescription Date:  ?  Last Fill Quantity: ?,   # refills: ?  Last Office Visit : 11/11/2021  Future Office visit:  12/6/2021    Routing refill request to provider for review/approval because:  Drug not active on patient's medication list      Alyson Jones RN  Central Triage Red Flags/Med Refills

## 2021-11-18 ENCOUNTER — VIRTUAL VISIT (OUTPATIENT)
Dept: PSYCHOLOGY | Facility: CLINIC | Age: 56
End: 2021-11-18
Payer: COMMERCIAL

## 2021-11-18 DIAGNOSIS — F33.41 RECURRENT MAJOR DEPRESSIVE DISORDER, IN PARTIAL REMISSION (H): Primary | ICD-10-CM

## 2021-11-18 PROCEDURE — 90834 PSYTX W PT 45 MINUTES: CPT | Mod: GT | Performed by: MARRIAGE & FAMILY THERAPIST

## 2021-11-18 NOTE — PROGRESS NOTES
Progress Note    Patient Name: Deandra Colón  Date: 11/18/2021         Service Type: Individual      Session Start Time: 5:02PM  Session End Time: 5:47PM     Session Length: 45    Session #: 4    Attendees: Client attended alone    Service Modality:  Video Visit:      Provider verified identity through the following two step process.  Patient provided:  Patient photo    Telemedicine Visit: The patient's condition can be safely assessed and treated via synchronous audio and visual telemedicine encounter.      Reason for Telemedicine Visit: Patient has requested telehealth visit    Originating Site (Patient Location): Patient's home    Distant Site (Provider Location): Provider Remote Setting- Home Office    Consent:  The patient/guardian has verbally consented to: the potential risks and benefits of telemedicine (video visit) versus in person care; bill my insurance or make self-payment for services provided; and responsibility for payment of non-covered services.     Patient would like the video invitation sent by:  My Chart    Mode of Communication:  Video Conference via [employer]    As the provider I attest to compliance with applicable laws and regulations related to telemedicine.     Treatment Plan Last Reviewed: 10/21/2021  PHQ-9 / PILAR-7 : Each session    DATA  Interactive Complexity: No  Crisis: No       Progress Since Last Session (Related to Symptoms / Goals / Homework):   Symptoms: No change ongoing stress    Homework: Partially completed      Episode of Care Goals: Minimal progress - ACTION (Actively working towards change); Intervened by reinforcing change plan / affirming steps taken     Current / Ongoing Stressors and Concerns:   Client has not increased her dose yet and we discussed the possibility of increasing it to 50mg especially given the winter coming. Talked about her recent conversation with her mother where she felt criticized. Also discussed her  decision making to move and how to stay in the proper frame of mind.     Treatment Objective(s) Addressed in This Session:   Increase interest, engagement, and pleasure in doing things     Intervention:   CBT: mindfulness practices        ASSESSMENT: Current Emotional / Mental Status (status of significant symptoms):   Risk status (Self / Other harm or suicidal ideation)   Patient denies current fears or concerns for personal safety.   Patient denies current or recent suicidal ideation or behaviors.   Patient denies current or recent homicidal ideation or behaviors.   Patient denies current or recent self injurious behavior or ideation.   Patient denies other safety concerns.   Patient reports there has been no change in risk factors since their last session.     Patient reports there has been no change in protective factors since their last session.     Recommended that patient call 911 or go to the local ED should there be a change in any of these risk factors.     Appearance:   Appropriate    Eye Contact:   Good    Psychomotor Behavior: Normal    Attitude:   Cooperative  Friendly Pleasant   Orientation:   All   Speech    Rate / Production: Normal     Volume:  Normal    Mood:    Anxious  Normal   Affect:    Worrisome    Thought Content:  Clear    Thought Form:  Coherent  Logical    Insight:    Good      Medication Review:   No changes to current psychiatric medication(s)     Medication Compliance:   Yes     Changes in Health Issues:   None reported     Chemical Use Review:   Substance Use: Chemical use reviewed, no active concerns identified      Tobacco Use: No current tobacco use.      Diagnosis:  1. Recurrent major depressive disorder, in partial remission (H)        Collateral Reports Completed:   Not Applicable    PLAN: (Patient Tasks / Therapist Tasks / Other)  Homework: Client to have an MRI and keep working on her shoulder. Also consider bumping medication up to 50mg.       Elizabeth Ivy,  LMFT  November 18, 2021                                                         ______________________________________________________________________                                                 Treatment Plan    Client's Name: Deandra Colón  YOB: 1965    Date: 10/21/2021    DSM-V Diagnoses: 296.31 - Major Depressive Disorder, Recurrent, Mild By History; V71.09 - No Diagnosis  Psychosocial / Contextual Factors: Limited social support  WHODAS: See Epic    Referral / Collaboration:  Referral to another professional/service is not indicated at this time..    Anticipated number of session or this episode of care: 10      MeasurableTreatment Goal(s) related to diagnosis / functional impairment(s)  Goal 1: Client will lower PHQ9 score to 3 or below.    I will know I've met my goal when I'm less depressed.      Objective #A (Client Action)    Client will Increase interest, engagement, and pleasure in doing things.  Status: New - Date: 10/21/2021     Intervention(s)  Therapist will assign homework to increase activity level.    Objective #B  Client will Feel less tired and more energy during the day .  Status: New - Date: 10/21/2021     Intervention(s)  Therapist will assign homework work on sleep hygiene.    Objective #C  Client will Identify negative self-talk and behaviors: challenge core beliefs, myths, and actions.  Status: New - Date: 10/21/2021     Intervention(s)  Therapist will teach emotional recognition/identification. and improve self talk.      Patient has reviewed and agreed to the above plan.      Elizabeth Ivy  October 21, 2021

## 2021-11-29 ENCOUNTER — HOSPITAL ENCOUNTER (OUTPATIENT)
Dept: GENERAL RADIOLOGY | Facility: CLINIC | Age: 56
End: 2021-11-29
Attending: STUDENT IN AN ORGANIZED HEALTH CARE EDUCATION/TRAINING PROGRAM
Payer: COMMERCIAL

## 2021-11-29 ENCOUNTER — HOSPITAL ENCOUNTER (OUTPATIENT)
Dept: MRI IMAGING | Facility: CLINIC | Age: 56
End: 2021-11-29
Attending: STUDENT IN AN ORGANIZED HEALTH CARE EDUCATION/TRAINING PROGRAM
Payer: COMMERCIAL

## 2021-11-29 VITALS — HEART RATE: 60 BPM | DIASTOLIC BLOOD PRESSURE: 65 MMHG | OXYGEN SATURATION: 100 % | SYSTOLIC BLOOD PRESSURE: 108 MMHG

## 2021-11-29 DIAGNOSIS — M24.111 LABRAL TEAR OF SHOULDER, DEGENERATIVE, RIGHT: ICD-10-CM

## 2021-11-29 PROCEDURE — 23350 INJECTION FOR SHOULDER X-RAY: CPT

## 2021-11-29 PROCEDURE — A9585 GADOBUTROL INJECTION: HCPCS | Performed by: STUDENT IN AN ORGANIZED HEALTH CARE EDUCATION/TRAINING PROGRAM

## 2021-11-29 PROCEDURE — 73222 MRI JOINT UPR EXTREM W/DYE: CPT | Mod: RT

## 2021-11-29 PROCEDURE — 255N000002 HC RX 255 OP 636: Performed by: STUDENT IN AN ORGANIZED HEALTH CARE EDUCATION/TRAINING PROGRAM

## 2021-11-29 PROCEDURE — 77002 NEEDLE LOCALIZATION BY XRAY: CPT

## 2021-11-29 PROCEDURE — 250N000011 HC RX IP 250 OP 636: Performed by: STUDENT IN AN ORGANIZED HEALTH CARE EDUCATION/TRAINING PROGRAM

## 2021-11-29 PROCEDURE — 250N000009 HC RX 250: Performed by: STUDENT IN AN ORGANIZED HEALTH CARE EDUCATION/TRAINING PROGRAM

## 2021-11-29 RX ORDER — EPINEPHRINE 1 MG/ML
0.1 INJECTION, SOLUTION, CONCENTRATE INTRAVENOUS ONCE
Status: COMPLETED | OUTPATIENT
Start: 2021-11-29 | End: 2021-11-29

## 2021-11-29 RX ORDER — IOPAMIDOL 408 MG/ML
20 INJECTION, SOLUTION INTRATHECAL ONCE
Status: COMPLETED | OUTPATIENT
Start: 2021-11-29 | End: 2021-11-29

## 2021-11-29 RX ORDER — GADOBUTROL 604.72 MG/ML
0.1 INJECTION INTRAVENOUS ONCE
Status: COMPLETED | OUTPATIENT
Start: 2021-11-29 | End: 2021-11-29

## 2021-11-29 RX ORDER — LIDOCAINE HYDROCHLORIDE 10 MG/ML
5 INJECTION, SOLUTION EPIDURAL; INFILTRATION; INTRACAUDAL; PERINEURAL ONCE
Status: COMPLETED | OUTPATIENT
Start: 2021-11-29 | End: 2021-11-29

## 2021-11-29 RX ORDER — IOPAMIDOL 408 MG/ML
10 INJECTION, SOLUTION INTRATHECAL ONCE
Status: COMPLETED | OUTPATIENT
Start: 2021-11-29 | End: 2021-11-29

## 2021-11-29 RX ADMIN — LIDOCAINE HYDROCHLORIDE 1 ML: 10 INJECTION, SOLUTION EPIDURAL; INFILTRATION; INTRACAUDAL; PERINEURAL at 10:42

## 2021-11-29 RX ADMIN — IOPAMIDOL 1 ML: 408 INJECTION, SOLUTION INTRATHECAL at 10:43

## 2021-11-29 RX ADMIN — IOPAMIDOL 0 ML: 408 INJECTION, SOLUTION INTRATHECAL at 10:58

## 2021-11-29 RX ADMIN — EPINEPHRINE 0.1 ML: 1 INJECTION, SOLUTION, CONCENTRATE INTRAVENOUS at 10:56

## 2021-11-29 RX ADMIN — GADOBUTROL 0.1 ML: 604.72 INJECTION INTRAVENOUS at 10:57

## 2021-11-29 NOTE — PROCEDURES
[facility]    Procedure: Right shoulder luke injection    Date/Time: 11/29/2021 10:48 AM  Performed by: Sebastian Be PA-C  Authorized by: Sebastian Be PA-C       UNIVERSAL PROTOCOL   Site Marked: Yes  Prior Images Obtained and Reviewed:  Yes  Required items: Required blood products, implants, devices and special equipment available    Patient identity confirmed:  Verbally with patient  Patient was reevaluated immediately before administering moderate or deep sedation or anesthesia  Confirmation Checklist:  Patient's identity using two indicators, relevant allergies, procedure was appropriate and matched the consent or emergent situation and correct equipment/implants were available  Time out: Immediately prior to the procedure a time out was called    Universal Protocol: the Joint Commission Universal Protocol was followed    Preparation: Patient was prepped and draped in usual sterile fashion       ANESTHESIA    Local Anesthetic: Lidocaine 1% without epinephrine      SEDATION    Patient Sedated: No    See dictated procedure note for full details.    PROCEDURE    Patient Tolerance:  Patient tolerated the procedure well with no immediate complications  Length of time physician/provider present for 1:1 monitoring during sedation: 0

## 2021-11-30 ENCOUNTER — MYC MEDICAL ADVICE (OUTPATIENT)
Dept: FAMILY MEDICINE | Facility: CLINIC | Age: 56
End: 2021-11-30
Payer: COMMERCIAL

## 2021-11-30 DIAGNOSIS — F33.9 EPISODE OF RECURRENT MAJOR DEPRESSIVE DISORDER, UNSPECIFIED DEPRESSION EPISODE SEVERITY (H): ICD-10-CM

## 2021-12-06 ENCOUNTER — VIRTUAL VISIT (OUTPATIENT)
Dept: ORTHOPEDICS | Facility: CLINIC | Age: 56
End: 2021-12-06
Payer: COMMERCIAL

## 2021-12-06 DIAGNOSIS — M67.911 TENDINOPATHY OF RIGHT ROTATOR CUFF: Primary | ICD-10-CM

## 2021-12-06 DIAGNOSIS — M19.011 OSTEOARTHRITIS OF GLENOHUMERAL JOINT, RIGHT: ICD-10-CM

## 2021-12-06 DIAGNOSIS — M19.011 ARTHROSIS OF RIGHT ACROMIOCLAVICULAR JOINT: ICD-10-CM

## 2021-12-06 PROCEDURE — 99214 OFFICE O/P EST MOD 30 MIN: CPT | Mod: TEL | Performed by: STUDENT IN AN ORGANIZED HEALTH CARE EDUCATION/TRAINING PROGRAM

## 2021-12-06 NOTE — TELEPHONE ENCOUNTER
Patient is requesting increase that was discussed at last visit.  Armida Ludwig RN  [facility]

## 2021-12-06 NOTE — LETTER
12/6/2021       RE: Deandra Colón  [address]  [place] [address]     Dear Colleague,    Thank you for referring your patient, Deandra Colón, to the [facility] SP[facility]IC[facility] [place] at [facility]. Please see a copy of my visit note below.    [facility] Clinic  Sports Medicine Clinic  Clinics and Surgery Center           SUBJECTIVE       Deandra Colón is a 56 year old female presenting to clinic today via telephone for a f/u of right shoulder problems.    11/10/21: 56-year-old female presenting to clinic today for follow-up of her right shoulder pain.  Patient has not had a successful bout of physical therapy as she has only gone twice and has not done any home therapy.  Has found some relief with the oral anti-inflammatories.  Given the amount of radicular symptoms she is having as well as her positive [place]'s test, I would be concerned for degenerative labral tear that may have formed a paralabral cyst compressing on the nerve.  Her neck examination was overall normal without any particular radicular symptoms coming from the cervical spine.     Plan: Given the above, and reviewing the chart with the patient and examining her previous MRI from 2019, we have elected to proceed going forward with repeating an MRI, arthrogram of the right shoulder, for further delineation.  I think the patient would benefit from actually doing physical therapy for the rotator cuff/adhesive capsulitis, to which we talked about at length.  I would like the patient to return to clinic virtually after her MRI has been obtained to discuss further findings and management.  Patient has had a few injections into the shoulder previously that were helpful, this could be of benefit depending on what we find on her MRI.      Interval hx: Deandra is relatively unchanged.  She has not done any physical therapy since October 2021.  At that time she only did 3  visits in person and go home therapy exercises.  She continues to have mild pain but the naproxen that was prescribed is very helpful.  She is not having any numbness or tingling, no neck pain.  She does continue to have concerns with cramping around her upper arm which seems unrelated to the shoulder pain.    PMH, Medications and Allergies were reviewed and updated as needed.    ROS:  As noted above otherwise negative.    Patient Active Problem List   Diagnosis     Right shoulder pain     Adhesive capsulitis of right shoulder     Biceps tendinitis of right upper extremity     Glaucoma suspect     Major depression, single episode     Musculoskeletal disorder     Pinguecula     Posttraumatic stress disorder     Right sided temporal headache     Special screening for malignant neoplasms, colon     Uterine leiomyoma       Current Outpatient Medications   Medication Sig Dispense Refill     ibuprofen (ADVIL,MOTRIN) 600 MG tablet [IBUPROFEN (ADVIL,MOTRIN) 600 MG TABLET] Take 1 tablet (600 mg total) by mouth every 6 (six) hours as needed for pain. 60 tablet 1     multivitamin therapeutic (THERAGRAN) tablet [MULTIVITAMIN THERAPEUTIC (THERAGRAN) TABLET] Take 1 tablet by mouth daily.       naproxen (NAPROSYN) 500 MG tablet Take 1 tablet (500 mg) by mouth 2 times daily (with meals) 60 tablet 0     sertraline (ZOLOFT) 25 MG tablet TAKE 1 TABLET (25 MG) BY MOUTH DAILY 30 tablet 5     omega-3/dha/epa/fish oil (FISH OIL-OMEGA-3 FATTY ACIDS) 300-1,000 mg capsule [OMEGA-3/DHA/EPA/FISH OIL (FISH OIL-OMEGA-3 FATTY ACIDS) 300-1,000 MG CAPSULE] Take 2 g by mouth daily. (Patient not taking: Reported on 8/13/2021)              OBJECTIVE:       Vitals: There were no vitals filed for this visit.  BMI: There is no height or weight on file to calculate BMI.    Physical exam deferred due to the nature of this visit.  Study Result    Narrative & Impression   3 views right shoulder radiographs 10/14/2021 10:46 AM     History: Acute pain of right  shoulder     Comparison: None     Findings:     AP, Grashey, transscapular Y, axillary  views of the right shoulder  were obtained.      No acute osseous abnormality. Glenohumeral and acromioclavicular  joints are congruent. Mild degenerative changes of the  acromioclavicular joint. Mild degenerative change of the glenohumeral  joint.     Soft tissue is unremarkable. The visualized lung is clear.                                                                      Impression:  1. No acute osseous abnormality.  2. Mild degenerative changes of the acromioclavicular joint and  glenohumeral joint       Study Result    Narrative & Impression   MR SHOULDER ARTHROGRAM  RIGHT WITH CONTRAST November 29, 2021 11:59 AM     HISTORY: Pain, soreness, spasm, tingling. Concern or degenerative  labral tear with paralabral cyst causing nerve compression.     TECHNIQUE: Following intra-articular glenohumeral joint injection of  diluted gadolinium (reported separately), coronal oblique, sagittal  oblique, and transverse fat suppressed T1, coronal oblique T2 and  inversion recovery, transverse gradient echo, and sagittal oblique fat  suppressed T2 weighted images were obtained.     FINDINGS:   Osseous acromial outlet: There is a type II subacromial configuration.  No apparent subacromial spur. There are mild degenerative changes at  the acromioclavicular joint which only minimally indent the  supraspinatus outlet.     Rotator cuff: Cystic resorptive changes are noted along the anatomical  neck of the humerus beneath the supraspinatus tendon attachment. There  may be adjacent mild articular surface fraying of the supraspinatus  tendon. However, there is no evidence of jackson tendon tear or rupture.  The remainder of the rotator cuff appears within normal limits.  Increased signal intensity within the subscapularis tendon may be an  artifact of the injection procedure. No rotator cuff muscle atrophy.     Biceps Tendon: No long head biceps  tendon tear, subluxation, or  tendinosis.     Labral Structures: The glenoid labrum appears within normal limits. No  paralabral cysts.     Osseous structures: Marrow signal about the shoulder is within normal  limits. Humeral head and glenoid articular cartilage surfaces appear  within normal limits.     Additional findings: Contrast is present within the glenohumeral  joint. Joint outline is within normal limits. Trace fluid is noted in  the subacromial/subdeltoid bursa which may have been introduced during  the injection procedure.                                                                      IMPRESSION:   1. Supraspinatus tendon mild articular surface fraying and adjacent  humeral anatomical neck cystic resorptive changes. No evidence of  jackson rotator cuff tear or tendon rupture.  2. Acromioclavicular mild or early degenerative arthrosis.  3. No evidence of labral tear or paralabral cyst. No rotator cuff  muscle atrophy to suggest suprascapular nerve compression.             ASSESSMENT and PLAN:     Deandra was seen today for telephone.    Diagnoses and all orders for this visit:    Tendinopathy of right rotator cuff  -     PHYSICAL THERAPY REFERRAL (Internal); Future    Arthrosis of right acromioclavicular joint  -     PHYSICAL THERAPY REFERRAL (Internal); Future    Osteoarthritis of glenohumeral joint, right  -     PHYSICAL THERAPY REFERRAL (Internal); Future    After long discussion with Deandra in terms of the MRI in detail, her overall presentation would be concerning for rotator cuff tendinopathy namely of the supraspinatus tendon as well as OA of the glenohumeral joint.  I think Deandra would benefit dramatically from repeating physical therapy with an emphasis on her home exercise program.  I also think that if she is not having much resolution of her pain, especially with the benefits that naproxen has shown, a corticosteroid injection would be of benefit to the patient whether that would be  subacromial or ultrasound-guided glenohumeral.  Educated Deandra about the above diagnoses and put much emphasis on her rolling and relieving her symptoms with a home exercise program after guidance from physical therapy.  Order has been placed.  I would like her to follow-up with us within 6 weeks after starting physical therapy.    Total telephone time: 23 minutes    Options for treatment and/or follow-up care were reviewed with the patient was actively involved in the decision making process. Patient verbalized understanding and was in agreement with the plan.    Noé Haywood DO  , Sports Medicine  Department of Family Medicine and [facility]

## 2021-12-06 NOTE — PROGRESS NOTES
[facility]  Sports Medicine Clinic  Clinics and Surgery Center           SUBJECTIVE       Deandra Colón is a 56 year old female presenting to clinic today via telephone for a f/u of right shoulder problems.    11/10/21: 56-year-old female presenting to clinic today for follow-up of her right shoulder pain.  Patient has not had a successful bout of physical therapy as she has only gone twice and has not done any home therapy.  Has found some relief with the oral anti-inflammatories.  Given the amount of radicular symptoms she is having as well as her positive [place]'s test, I would be concerned for degenerative labral tear that may have formed a paralabral cyst compressing on the nerve.  Her neck examination was overall normal without any particular radicular symptoms coming from the cervical spine.     Plan: Given the above, and reviewing the chart with the patient and examining her previous MRI from 2019, we have elected to proceed going forward with repeating an MRI, arthrogram of the right shoulder, for further delineation.  I think the patient would benefit from actually doing physical therapy for the rotator cuff/adhesive capsulitis, to which we talked about at length.  I would like the patient to return to clinic virtually after her MRI has been obtained to discuss further findings and management.  Patient has had a few injections into the shoulder previously that were helpful, this could be of benefit depending on what we find on her MRI.      Interval hx: Deandra is relatively unchanged.  She has not done any physical therapy since October 2021.  At that time she only did 3 visits in person and go home therapy exercises.  She continues to have mild pain but the naproxen that was prescribed is very helpful.  She is not having any numbness or tingling, no neck pain.  She does continue to have concerns with cramping around her upper arm which seems unrelated to the shoulder pain.    PMH,  Medications and Allergies were reviewed and updated as needed.    ROS:  As noted above otherwise negative.    Patient Active Problem List   Diagnosis     Right shoulder pain     Adhesive capsulitis of right shoulder     Biceps tendinitis of right upper extremity     Glaucoma suspect     Major depression, single episode     Musculoskeletal disorder     Pinguecula     Posttraumatic stress disorder     Right sided temporal headache     Special screening for malignant neoplasms, colon     Uterine leiomyoma       Current Outpatient Medications   Medication Sig Dispense Refill     ibuprofen (ADVIL,MOTRIN) 600 MG tablet [IBUPROFEN (ADVIL,MOTRIN) 600 MG TABLET] Take 1 tablet (600 mg total) by mouth every 6 (six) hours as needed for pain. 60 tablet 1     multivitamin therapeutic (THERAGRAN) tablet [MULTIVITAMIN THERAPEUTIC (THERAGRAN) TABLET] Take 1 tablet by mouth daily.       naproxen (NAPROSYN) 500 MG tablet Take 1 tablet (500 mg) by mouth 2 times daily (with meals) 60 tablet 0     sertraline (ZOLOFT) 25 MG tablet TAKE 1 TABLET (25 MG) BY MOUTH DAILY 30 tablet 5     omega-3/dha/epa/fish oil (FISH OIL-OMEGA-3 FATTY ACIDS) 300-1,000 mg capsule [OMEGA-3/DHA/EPA/FISH OIL (FISH OIL-OMEGA-3 FATTY ACIDS) 300-1,000 MG CAPSULE] Take 2 g by mouth daily. (Patient not taking: Reported on 8/13/2021)              OBJECTIVE:       Vitals: There were no vitals filed for this visit.  BMI: There is no height or weight on file to calculate BMI.    Physical exam deferred due to the nature of this visit.  Study Result    Narrative & Impression   3 views right shoulder radiographs 10/14/2021 10:46 AM     History: Acute pain of right shoulder     Comparison: None     Findings:     AP, Grashey, transscapular Y, axillary  views of the right shoulder  were obtained.      No acute osseous abnormality. Glenohumeral and acromioclavicular  joints are congruent. Mild degenerative changes of the  acromioclavicular joint. Mild degenerative change of the  glenohumeral  joint.     Soft tissue is unremarkable. The visualized lung is clear.                                                                      Impression:  1. No acute osseous abnormality.  2. Mild degenerative changes of the acromioclavicular joint and  glenohumeral joint       Study Result    Narrative & Impression   MR SHOULDER ARTHROGRAM  RIGHT WITH CONTRAST November 29, 2021 11:59 AM     HISTORY: Pain, soreness, spasm, tingling. Concern or degenerative  labral tear with paralabral cyst causing nerve compression.     TECHNIQUE: Following intra-articular glenohumeral joint injection of  diluted gadolinium (reported separately), coronal oblique, sagittal  oblique, and transverse fat suppressed T1, coronal oblique T2 and  inversion recovery, transverse gradient echo, and sagittal oblique fat  suppressed T2 weighted images were obtained.     FINDINGS:   Osseous acromial outlet: There is a type II subacromial configuration.  No apparent subacromial spur. There are mild degenerative changes at  the acromioclavicular joint which only minimally indent the  supraspinatus outlet.     Rotator cuff: Cystic resorptive changes are noted along the anatomical  neck of the humerus beneath the supraspinatus tendon attachment. There  may be adjacent mild articular surface fraying of the supraspinatus  tendon. However, there is no evidence of jackson tendon tear or rupture.  The remainder of the rotator cuff appears within normal limits.  Increased signal intensity within the subscapularis tendon may be an  artifact of the injection procedure. No rotator cuff muscle atrophy.     Biceps Tendon: No long head biceps tendon tear, subluxation, or  tendinosis.     Labral Structures: The glenoid labrum appears within normal limits. No  paralabral cysts.     Osseous structures: Marrow signal about the shoulder is within normal  limits. Humeral head and glenoid articular cartilage surfaces appear  within normal  limits.     Additional findings: Contrast is present within the glenohumeral  joint. Joint outline is within normal limits. Trace fluid is noted in  the subacromial/subdeltoid bursa which may have been introduced during  the injection procedure.                                                                      IMPRESSION:   1. Supraspinatus tendon mild articular surface fraying and adjacent  humeral anatomical neck cystic resorptive changes. No evidence of  jackson rotator cuff tear or tendon rupture.  2. Acromioclavicular mild or early degenerative arthrosis.  3. No evidence of labral tear or paralabral cyst. No rotator cuff  muscle atrophy to suggest suprascapular nerve compression.             ASSESSMENT and PLAN:     Deandra was seen today for telephone.    Diagnoses and all orders for this visit:    Tendinopathy of right rotator cuff  -     PHYSICAL THERAPY REFERRAL (Internal); Future    Arthrosis of right acromioclavicular joint  -     PHYSICAL THERAPY REFERRAL (Internal); Future    Osteoarthritis of glenohumeral joint, right  -     PHYSICAL THERAPY REFERRAL (Internal); Future    After long discussion with Deandra in terms of the MRI in detail, her overall presentation would be concerning for rotator cuff tendinopathy namely of the supraspinatus tendon as well as OA of the glenohumeral joint.  I think Deandra would benefit dramatically from repeating physical therapy with an emphasis on her home exercise program.  I also think that if she is not having much resolution of her pain, especially with the benefits that naproxen has shown, a corticosteroid injection would be of benefit to the patient whether that would be subacromial or ultrasound-guided glenohumeral.  Educated Deandra about the above diagnoses and put much emphasis on her rolling and relieving her symptoms with a home exercise program after guidance from physical therapy.  Order has been placed.  I would like her to follow-up with us within 6 weeks  after starting physical therapy.    Total telephone time: 23 minutes    Options for treatment and/or follow-up care were reviewed with the patient was actively involved in the decision making process. Patient verbalized understanding and was in agreement with the plan.    Noé Haywood DO  , Sports Medicine  Department of Family Medicine and [facility]

## 2021-12-06 NOTE — LETTER
12/6/2021      RE: Deandra Colón  [address]  [place] [address]       [facility]  Sports Medicine Clinic  [facility]           SUBJECTIVE       Deandra Colón is a 56 year old female presenting to clinic today via telephone for a f/u of right shoulder problems.    11/10/21: 56-year-old female presenting to clinic today for follow-up of her right shoulder pain.  Patient has not had a successful bout of physical therapy as she has only gone twice and has not done any home therapy.  Has found some relief with the oral anti-inflammatories.  Given the amount of radicular symptoms she is having as well as her positive [place]'s test, I would be concerned for degenerative labral tear that may have formed a paralabral cyst compressing on the nerve.  Her neck examination was overall normal without any particular radicular symptoms coming from the cervical spine.     Plan: Given the above, and reviewing the chart with the patient and examining her previous MRI from 2019, we have elected to proceed going forward with repeating an MRI, arthrogram of the right shoulder, for further delineation.  I think the patient would benefit from actually doing physical therapy for the rotator cuff/adhesive capsulitis, to which we talked about at length.  I would like the patient to return to clinic virtually after her MRI has been obtained to discuss further findings and management.  Patient has had a few injections into the shoulder previously that were helpful, this could be of benefit depending on what we find on her MRI.      Interval hx: Deandra is relatively unchanged.  She has not done any physical therapy since October 2021.  At that time she only did 3 visits in person and go home therapy exercises.  She continues to have mild pain but the naproxen that was prescribed is very helpful.  She is not having any numbness or tingling, no neck pain.  She does continue to have concerns with  cramping around her upper arm which seems unrelated to the shoulder pain.    PMH, Medications and Allergies were reviewed and updated as needed.    ROS:  As noted above otherwise negative.    Patient Active Problem List   Diagnosis     Right shoulder pain     Adhesive capsulitis of right shoulder     Biceps tendinitis of right upper extremity     Glaucoma suspect     Major depression, single episode     Musculoskeletal disorder     Pinguecula     Posttraumatic stress disorder     Right sided temporal headache     Special screening for malignant neoplasms, colon     Uterine leiomyoma       Current Outpatient Medications   Medication Sig Dispense Refill     ibuprofen (ADVIL,MOTRIN) 600 MG tablet [IBUPROFEN (ADVIL,MOTRIN) 600 MG TABLET] Take 1 tablet (600 mg total) by mouth every 6 (six) hours as needed for pain. 60 tablet 1     multivitamin therapeutic (THERAGRAN) tablet [MULTIVITAMIN THERAPEUTIC (THERAGRAN) TABLET] Take 1 tablet by mouth daily.       naproxen (NAPROSYN) 500 MG tablet Take 1 tablet (500 mg) by mouth 2 times daily (with meals) 60 tablet 0     sertraline (ZOLOFT) 25 MG tablet TAKE 1 TABLET (25 MG) BY MOUTH DAILY 30 tablet 5     omega-3/dha/epa/fish oil (FISH OIL-OMEGA-3 FATTY ACIDS) 300-1,000 mg capsule [OMEGA-3/DHA/EPA/FISH OIL (FISH OIL-OMEGA-3 FATTY ACIDS) 300-1,000 MG CAPSULE] Take 2 g by mouth daily. (Patient not taking: Reported on 8/13/2021)              OBJECTIVE:       Vitals: There were no vitals filed for this visit.  BMI: There is no height or weight on file to calculate BMI.    Physical exam deferred due to the nature of this visit.  Study Result    Narrative & Impression   3 views right shoulder radiographs 10/14/2021 10:46 AM     History: Acute pain of right shoulder     Comparison: None     Findings:     AP, [place], transscapular Y, axillary  views of the right shoulder  were obtained.      No acute osseous abnormality. Glenohumeral and acromioclavicular  joints are congruent. Mild  degenerative changes of the  acromioclavicular joint. Mild degenerative change of the glenohumeral  joint.     Soft tissue is unremarkable. The visualized lung is clear.                                                                      Impression:  1. No acute osseous abnormality.  2. Mild degenerative changes of the acromioclavicular joint and  glenohumeral joint       Study Result    Narrative & Impression   MR SHOULDER ARTHROGRAM  RIGHT WITH CONTRAST November 29, 2021 11:59 AM     HISTORY: Pain, soreness, spasm, tingling. Concern or degenerative  labral tear with paralabral cyst causing nerve compression.     TECHNIQUE: Following intra-articular glenohumeral joint injection of  diluted gadolinium (reported separately), coronal oblique, sagittal  oblique, and transverse fat suppressed T1, coronal oblique T2 and  inversion recovery, transverse gradient echo, and sagittal oblique fat  suppressed T2 weighted images were obtained.     FINDINGS:   Osseous acromial outlet: There is a type II subacromial configuration.  No apparent subacromial spur. There are mild degenerative changes at  the acromioclavicular joint which only minimally indent the  supraspinatus outlet.     Rotator cuff: Cystic resorptive changes are noted along the anatomical  neck of the humerus beneath the supraspinatus tendon attachment. There  may be adjacent mild articular surface fraying of the supraspinatus  tendon. However, there is no evidence of jackson tendon tear or rupture.  The remainder of the rotator cuff appears within normal limits.  Increased signal intensity within the subscapularis tendon may be an  artifact of the injection procedure. No rotator cuff muscle atrophy.     Biceps Tendon: No long head biceps tendon tear, subluxation, or  tendinosis.     Labral Structures: The glenoid labrum appears within normal limits. No  paralabral cysts.     Osseous structures: Marrow signal about the shoulder is within normal  limits. Humeral  head and glenoid articular cartilage surfaces appear  within normal limits.     Additional findings: Contrast is present within the glenohumeral  joint. Joint outline is within normal limits. Trace fluid is noted in  the subacromial/subdeltoid bursa which may have been introduced during  the injection procedure.                                                                      IMPRESSION:   1. Supraspinatus tendon mild articular surface fraying and adjacent  humeral anatomical neck cystic resorptive changes. No evidence of  jackson rotator cuff tear or tendon rupture.  2. Acromioclavicular mild or early degenerative arthrosis.  3. No evidence of labral tear or paralabral cyst. No rotator cuff  muscle atrophy to suggest suprascapular nerve compression.             ASSESSMENT and PLAN:     Deandra was seen today for telephone.    Diagnoses and all orders for this visit:    Tendinopathy of right rotator cuff  -     PHYSICAL THERAPY REFERRAL (Internal); Future    Arthrosis of right acromioclavicular joint  -     PHYSICAL THERAPY REFERRAL (Internal); Future    Osteoarthritis of glenohumeral joint, right  -     PHYSICAL THERAPY REFERRAL (Internal); Future    After long discussion with Deandra in terms of the MRI in detail, her overall presentation would be concerning for rotator cuff tendinopathy namely of the supraspinatus tendon as well as OA of the glenohumeral joint.  I think Deandra would benefit dramatically from repeating physical therapy with an emphasis on her home exercise program.  I also think that if she is not having much resolution of her pain, especially with the benefits that naproxen has shown, a corticosteroid injection would be of benefit to the patient whether that would be subacromial or ultrasound-guided glenohumeral.  Educated Deandra about the above diagnoses and put much emphasis on her rolling and relieving her symptoms with a home exercise program after guidance from physical therapy.  Order has  been placed.  I would like her to follow-up with us within 6 weeks after starting physical therapy.    Total telephone time: 23 minutes    Options for treatment and/or follow-up care were reviewed with the patient was actively involved in the decision making process. Patient verbalized understanding and was in agreement with the plan.    Noé Haywood DO  , Sports Medicine  Department of Family Medicine and [facility]

## 2021-12-10 ENCOUNTER — THERAPY VISIT (OUTPATIENT)
Dept: PHYSICAL THERAPY | Facility: CLINIC | Age: 56
End: 2021-12-10
Attending: STUDENT IN AN ORGANIZED HEALTH CARE EDUCATION/TRAINING PROGRAM
Payer: COMMERCIAL

## 2021-12-10 DIAGNOSIS — M25.511 ACUTE PAIN OF RIGHT SHOULDER: Primary | ICD-10-CM

## 2021-12-10 DIAGNOSIS — M67.911 TENDINOPATHY OF RIGHT ROTATOR CUFF: ICD-10-CM

## 2021-12-10 DIAGNOSIS — M19.011 OSTEOARTHRITIS OF GLENOHUMERAL JOINT, RIGHT: ICD-10-CM

## 2021-12-10 DIAGNOSIS — M19.011 ARTHROSIS OF RIGHT ACROMIOCLAVICULAR JOINT: ICD-10-CM

## 2021-12-10 PROCEDURE — 97110 THERAPEUTIC EXERCISES: CPT | Mod: GP

## 2021-12-10 PROCEDURE — 97530 THERAPEUTIC ACTIVITIES: CPT | Mod: GP

## 2021-12-10 NOTE — PROGRESS NOTES
PROGRESS REPORT    Deandra has been in therapy from Oct 15, 2021 to Dec 10, 2021 for treatment of R shoulder pain with gaps in care.    Therapist Impression:  Patient previously seen for possible adhesive capsulitis in R UE, recently saw MD with dx R UE of rotator cuff pathology/tendinopathy of the infraspinatus as well as possible labral pathology with new PT orders and instructions to follow up in 4-6 weeks. Patient has restricted cervical ROM, painful shoulder EXT/IR with + median nerve impingement test. Patient states she was previously non-compliant with HEP, significant time spent educating patient on findings and importance of HEP, states at work she is busy. Patient given 2 exercises to focus on: cervical retraction and scapular strengthening.    Subjective:  Patient reports having nerve pain down R UE and neck pain that is worse in the morning and with working with the computer mouse. PMH L UE frozen shoulder.    Objective:    STATIC POSTURE  Forward head: moderate     Rounded shoulders:moderate  Shoulder internally rotated: mild     CERVICAL SCREEN  AROM  Extension lacks 25%  SB HENRIQUE lacks 25%    SHOULDER RANGE OF MOTION  AROM Flexion Abduction ER   Base Ext/IR Extension   Left WNL WNL WNL WNL  WNL   Right WNL WNL WNL WNL - painful WNL     SHOULDER STRENGTH  MMT Flexion IR ER   Left 4-/5 4+/5 4+/5   Right 3+/5, painful 4+/5 4+/5     Palpation:  Mild tenderness to R biceps tendon, acromion    Nerve impingement test:  +Median HENRIQUE R>L    Repeated cervical retraction:  Sx pre: tingling in R UE fingers  Sx post: tingling in bicep and neck      Assessment/Plan:  Patient is a 56 year old female with cervical and right side shoulder complaints.    Patient has the following significant findings with corresponding treatment plan.                Diagnosis 1:  Impaired working  Pain -  hot/cold therapy, manual therapy and self management  Decreased strength - therapeutic exercise and therapeutic activities  Impaired  muscle performance - neuro re-education  Decreased function - therapeutic activities  Impaired posture - neuro re-education    Therapy Evaluation Codes:   1) History comprised of:   Personal factors that impact the plan of care:      Work status.    Comorbidity factors that impact the plan of care are:      Numbness/tingling and Osteoarthritis.     Medications impacting care: Pain.  2) Examination of Body Systems comprised of:   Body structures and functions that impact the plan of care:      Cervical spine and Shoulder.   Activity limitations that impact the plan of care are:      Reading/Computer work, Sitting and Sleeping.  3) Clinical presentation characteristics are:   Stable/Uncomplicated.  4) Decision-Making    Low complexity using standardized patient assessment instrument and/or measureable assessment of functional outcome.  Cumulative Therapy Evaluation is: Low complexity.    Previous and current functional limitations:  (See Goal Flow Sheet for this information)    Short term and Long term goals: (See Goal Flow Sheet for this information)     Communication ability:  Patient appears to be able to clearly communicate and understand verbal and written communication and follow directions correctly.  Treatment Explanation - The following has been discussed with the patient:   RX ordered/plan of care  Anticipated outcomes  Possible risks and side effects  This patient would benefit from PT intervention to resume normal activities.   Rehab potential is good.    Frequency:  2 X a month, once daily  Duration:  for 3 months  Discharge Plan:  Achieve all LTG.  Independent in home treatment program.  Reach maximal therapeutic benefit.    Please refer to the daily flowsheet for treatment today, total treatment time and time spent performing 1:1 timed codes.

## 2022-01-06 DIAGNOSIS — F33.9 EPISODE OF RECURRENT MAJOR DEPRESSIVE DISORDER, UNSPECIFIED DEPRESSION EPISODE SEVERITY (H): ICD-10-CM

## 2022-01-07 DIAGNOSIS — F33.9 EPISODE OF RECURRENT MAJOR DEPRESSIVE DISORDER, UNSPECIFIED DEPRESSION EPISODE SEVERITY (H): ICD-10-CM

## 2022-01-07 NOTE — TELEPHONE ENCOUNTER
Prescription approved per [facility] Refill Protocol. For one month; needs follow up.  Christi Coto RN  [facility]

## 2022-01-20 ENCOUNTER — VIRTUAL VISIT (OUTPATIENT)
Dept: PSYCHOLOGY | Facility: CLINIC | Age: 57
End: 2022-01-20
Payer: COMMERCIAL

## 2022-01-20 DIAGNOSIS — F33.41 RECURRENT MAJOR DEPRESSION IN PARTIAL REMISSION (H): Primary | ICD-10-CM

## 2022-01-20 PROCEDURE — 90834 PSYTX W PT 45 MINUTES: CPT | Mod: GT | Performed by: MARRIAGE & FAMILY THERAPIST

## 2022-01-20 NOTE — PROGRESS NOTES
Progress Note    Patient Name: Deandra Colón  Date: 1/20/2022         Service Type: Individual      Session Start Time: 8:03AM  Session End Time: 8:48AM     Session Length: 45    Session #: 6    Attendees: Client attended alone    Service Modality:  Video Visit:      Provider verified identity through the following two step process.  Patient provided:  Patient photo    Telemedicine Visit: The patient's condition can be safely assessed and treated via synchronous audio and visual telemedicine encounter.      Reason for Telemedicine Visit: Patient has requested telehealth visit    Originating Site (Patient Location): Patient's home    Distant Site (Provider Location): Provider Remote Setting- Home Office    Consent:  The patient/guardian has verbally consented to: the potential risks and benefits of telemedicine (video visit) versus in person care; bill my insurance or make self-payment for services provided; and responsibility for payment of non-covered services.     Patient would like the video invitation sent by:  My Chart    Mode of Communication:  Video Conference via [employer]    As the provider I attest to compliance with applicable laws and regulations related to telemedicine.     Treatment Plan Last Reviewed: 1/20/2022  PHQ-9 / PILAR-7 : Monthly    DATA  Interactive Complexity: No  Crisis: No       Progress Since Last Session (Related to Symptoms / Goals / Homework):   Symptoms: Improving increased benefit from medication    Homework: Partially completed      Episode of Care Goals: Satisfactory progress - ACTION (Actively working towards change); Intervened by reinforcing change plan / affirming steps taken     Current / Ongoing Stressors and Concerns:   Client increased her dose and is taking 50mg now which therapist shared is still a low dose. Client talks today about her mother's diagnosis of breast cancer which has been difficult for client to process. Her mother  will learn this week what the treatment will be. Client feels badly she cannot be there. Also having a birthday on Sunday and struggling in some ways with friendships. Continues to debate where to live.     Treatment Objective(s) Addressed in This Session:   Increase interest, engagement, and pleasure in doing things     Intervention:   CBT: mindfulness practices        ASSESSMENT: Current Emotional / Mental Status (status of significant symptoms):   Risk status (Self / Other harm or suicidal ideation)   Patient denies current fears or concerns for personal safety.   Patient denies current or recent suicidal ideation or behaviors.   Patient denies current or recent homicidal ideation or behaviors.   Patient denies current or recent self injurious behavior or ideation.   Patient denies other safety concerns.   Patient reports there has been no change in risk factors since their last session.     Patient reports there has been no change in protective factors since their last session.     Recommended that patient call 911 or go to the local ED should there be a change in any of these risk factors.     Appearance:   Appropriate    Eye Contact:   Good    Psychomotor Behavior: Normal    Attitude:   Cooperative  Friendly Pleasant   Orientation:   All   Speech    Rate / Production: Normal     Volume:  Normal    Mood:    Anxious  Normal   Affect:    Worrisome    Thought Content:  Clear    Thought Form:  Coherent  Logical    Insight:    Good      Medication Review:   Changes to psychiatric medications, see updated Medication List in EPIC.      Medication Compliance:   Yes     Changes in Health Issues:   None reported     Chemical Use Review:   Substance Use: Chemical use reviewed, no active concerns identified      Tobacco Use: No current tobacco use.      Diagnosis:  1. Recurrent major depression in partial remission (H)        Collateral Reports Completed:   Not Applicable    PLAN: (Patient Tasks / Therapist Tasks /  Other)  Homework: Client to continue to consider friend sources.        INOCENCIA Mondragon    _______________________________________________________________________________________________________________                                                 Treatment Plan    Client's Name: Deandra Colón  YOB: 1965    Date: 1/20/2022    DSM-V Diagnoses: 296.31 - Major Depressive Disorder, Recurrent, Mild By History; V71.09 - No Diagnosis  Psychosocial / Contextual Factors: Limited social support  WHODAS: See Epic    Referral / Collaboration:  Referral to another professional/service is not indicated at this time..    Anticipated number of session or this episode of care: 10      MeasurableTreatment Goal(s) related to diagnosis / functional impairment(s)  Goal 1: Client will lower PHQ9 score to 3 or below.    I will know I've met my goal when I'm less depressed.      Objective #A (Client Action)    Client will Increase interest, engagement, and pleasure in doing things.  Status: Continued - Date(s):1/20/2022     Intervention(s)  Therapist will assign homework to increase activity level.    Objective #B  Client will Feel less tired and more energy during the day .  Status: Continued - Date(s):1/20/2022     Intervention(s)  Therapist will assign homework work on sleep hygiene.    Objective #C  Client will Identify negative self-talk and behaviors: challenge core beliefs, myths, and actions.  Status: Continued - Date(s):1/20/2022     Intervention(s)  Therapist will teach emotional recognition/identification. and improve self talk.      Patient has reviewed and agreed to the above plan.      INOCENCIA Mondragon  January 20, 2022

## 2022-02-07 DIAGNOSIS — F33.9 EPISODE OF RECURRENT MAJOR DEPRESSIVE DISORDER, UNSPECIFIED DEPRESSION EPISODE SEVERITY (H): ICD-10-CM

## 2022-02-08 NOTE — TELEPHONE ENCOUNTER
Medication is being filled for 1 time refill only due to:  Patient needs to be seen because due for virtual appt for f/u early Feb.     TCs- please contact pt to set up virtual appt- was told she was due in 2 mo in Dec.     Justine Durand, UNAN RN  [facility]

## 2022-02-22 ASSESSMENT — PATIENT HEALTH QUESTIONNAIRE - PHQ9
10. IF YOU CHECKED OFF ANY PROBLEMS, HOW DIFFICULT HAVE THESE PROBLEMS MADE IT FOR YOU TO DO YOUR WORK, TAKE CARE OF THINGS AT HOME, OR GET ALONG WITH OTHER PEOPLE: NOT DIFFICULT AT ALL
SUM OF ALL RESPONSES TO PHQ QUESTIONS 1-9: 3
SUM OF ALL RESPONSES TO PHQ QUESTIONS 1-9: 3

## 2022-02-23 ENCOUNTER — VIRTUAL VISIT (OUTPATIENT)
Dept: PSYCHOLOGY | Facility: CLINIC | Age: 57
End: 2022-02-23
Payer: COMMERCIAL

## 2022-02-23 DIAGNOSIS — F33.1 MODERATE EPISODE OF RECURRENT MAJOR DEPRESSIVE DISORDER (H): Primary | ICD-10-CM

## 2022-02-23 PROCEDURE — 90834 PSYTX W PT 45 MINUTES: CPT | Mod: GT | Performed by: MARRIAGE & FAMILY THERAPIST

## 2022-02-23 ASSESSMENT — PATIENT HEALTH QUESTIONNAIRE - PHQ9: SUM OF ALL RESPONSES TO PHQ QUESTIONS 1-9: 3

## 2022-02-24 NOTE — PROGRESS NOTES
Progress Note    Patient Name: Deandra Colón  Date: 2/24/2022         Service Type: Individual      Session Start Time: 1:03PM  Session End Time: 1:48PM     Session Length: 45    Session #: 7    Attendees: Client attended alone    Service Modality:  Video Visit:      Provider verified identity through the following two step process.  Patient provided:  Patient photo    Telemedicine Visit: The patient's condition can be safely assessed and treated via synchronous audio and visual telemedicine encounter.      Reason for Telemedicine Visit: Patient has requested telehealth visit    Originating Site (Patient Location): Patient's home    Distant Site (Provider Location): Provider Remote Setting- Home Office    Consent:  The patient/guardian has verbally consented to: the potential risks and benefits of telemedicine (video visit) versus in person care; bill my insurance or make self-payment for services provided; and responsibility for payment of non-covered services.     Patient would like the video invitation sent by:  My Chart    Mode of Communication:  Video Conference via Amwell    As the provider I attest to compliance with applicable laws and regulations related to telemedicine.     Treatment Plan Last Reviewed: 1/20/2022  PHQ-9 / PILAR-7 : Monthly    DATA  Interactive Complexity: No  Crisis: No       Progress Since Last Session (Related to Symptoms / Goals / Homework):   Symptoms: No change mod depressed mood    Homework: Partially completed      Episode of Care Goals: Satisfactory progress - ACTION (Actively working towards change); Intervened by reinforcing change plan / affirming steps taken     Current / Ongoing Stressors and Concerns:   Client continues at the 50mg of medication but says she isn't sure how much it's working. Realizes she could be feeling worse without it but acknowledges things are difficult in many areas of her life. She is still in limbo with many  "things. She did learn her mother had surgery and they will not do chemotherapy because of her age. She has not been socializing but does go to [place]. Client talks about the racial tones she experiences at work. Also feels she doesn't want to say much to address it as she doesn't feel it would be \"worth it\". Client continues to have shoulder pain and is trying to manage this.      Treatment Objective(s) Addressed in This Session:   Increase interest, engagement, and pleasure in doing things  Decrease frequency and intensity of feeling down, depressed, hopeless  Feel less tired and more energy during the day   Identify negative self-talk and behaviors: challenge core beliefs, myths, and actions     Intervention:   CBT: mindfulness practices and increased activity including socializing        ASSESSMENT: Current Emotional / Mental Status (status of significant symptoms):   Risk status (Self / Other harm or suicidal ideation)   Patient denies current fears or concerns for personal safety.   Patient denies current or recent suicidal ideation or behaviors.   Patient denies current or recent homicidal ideation or behaviors.   Patient denies current or recent self injurious behavior or ideation.   Patient denies other safety concerns.   Patient reports there has been no change in risk factors since their last session.     Patient reports there has been no change in protective factors since their last session.     Recommended that patient call 911 or go to the local ED should there be a change in any of these risk factors.     Appearance:   Appropriate    Eye Contact:   Good    Psychomotor Behavior: Normal    Attitude:   Cooperative  Friendly Pleasant Attentive   Orientation:   All   Speech    Rate / Production: Normal/ Responsive Talkative    Volume:  Normal    Mood:    Depressed  Irritable    Affect:    Worrisome    Thought Content:  Clear  Rumination    Thought Form:  Coherent  Goal Directed  Logical    Insight:    Good "      Medication Review:   No changes to current psychiatric medication(s)     Medication Compliance:   Yes     Changes in Health Issues:   None reported     Chemical Use Review:   Substance Use: Chemical use reviewed, no active concerns identified      Tobacco Use: No current tobacco use.      Diagnosis:  1. Moderate episode of recurrent major depressive disorder (H)        Collateral Reports Completed:   Not Applicable    PLAN: (Patient Tasks / Therapist Tasks / Other)  Homework: Client to plan a social outing 1-2 times this month.      Elizabeth Carrillolexi Ivy, LMFT    _______________________________________________________________________________________________________________                                                 Treatment Plan    Client's Name: Deandra Colón  YOB: 1965    Date: 1/20/2022    DSM-V Diagnoses: 296.31 - Major Depressive Disorder, Recurrent, Mild By History; V71.09 - No Diagnosis  Psychosocial / Contextual Factors: Limited social support  WHODAS: See Epic    Referral / Collaboration:  Referral to another professional/service is not indicated at this time..    Anticipated number of session or this episode of care: 10      MeasurableTreatment Goal(s) related to diagnosis / functional impairment(s)  Goal 1: Client will lower PHQ9 score to 3 or below.    I will know I've met my goal when I'm less depressed.      Objective #A (Client Action)    Client will Increase interest, engagement, and pleasure in doing things.  Status: Continued - Date(s):1/20/2022     Intervention(s)  Therapist will assign homework to increase activity level.    Objective #B  Client will Feel less tired and more energy during the day .  Status: Continued - Date(s):1/20/2022     Intervention(s)  Therapist will assign homework work on sleep hygiene.    Objective #C  Client will Identify negative self-talk and behaviors: challenge core beliefs, myths, and actions.  Status: Continued - Date(s):1/20/2022      Intervention(s)  Therapist will teach emotional recognition/identification. and improve self talk.      Patient has reviewed and agreed to the above plan.      Elizabeth Ivy, INOCENCIA  January 20, 2022  Answers for HPI/ROS submitted by the patient on 2/22/2022  If you checked off any problems, how difficult have these problems made it for you to do your work, take care of things at home, or get along with other people?: Not difficult at all  PHQ9 TOTAL SCORE: 3

## 2022-04-14 ENCOUNTER — OFFICE VISIT (OUTPATIENT)
Dept: FAMILY MEDICINE | Facility: CLINIC | Age: 57
End: 2022-04-14
Payer: COMMERCIAL

## 2022-04-14 VITALS
DIASTOLIC BLOOD PRESSURE: 66 MMHG | WEIGHT: 124 LBS | BODY MASS INDEX: 23.62 KG/M2 | TEMPERATURE: 97.8 F | HEART RATE: 77 BPM | SYSTOLIC BLOOD PRESSURE: 100 MMHG | OXYGEN SATURATION: 98 %

## 2022-04-14 DIAGNOSIS — F33.42 RECURRENT MAJOR DEPRESSIVE DISORDER, IN FULL REMISSION (H): Primary | ICD-10-CM

## 2022-04-14 DIAGNOSIS — G47.00 INSOMNIA, UNSPECIFIED TYPE: ICD-10-CM

## 2022-04-14 PROBLEM — F33.9 EPISODE OF RECURRENT MAJOR DEPRESSIVE DISORDER, UNSPECIFIED DEPRESSION EPISODE SEVERITY (H): Status: ACTIVE | Noted: 2022-04-14

## 2022-04-14 PROCEDURE — 99214 OFFICE O/P EST MOD 30 MIN: CPT | Performed by: FAMILY MEDICINE

## 2022-04-14 RX ORDER — SERTRALINE HYDROCHLORIDE 25 MG/1
TABLET, FILM COATED ORAL
Qty: 21 TABLET | Refills: 0 | Status: SHIPPED | OUTPATIENT
Start: 2022-04-14 | End: 2022-12-05

## 2022-04-14 ASSESSMENT — ANXIETY QUESTIONNAIRES
7. FEELING AFRAID AS IF SOMETHING AWFUL MIGHT HAPPEN: NOT AT ALL
GAD7 TOTAL SCORE: 1
5. BEING SO RESTLESS THAT IT IS HARD TO SIT STILL: NOT AT ALL
3. WORRYING TOO MUCH ABOUT DIFFERENT THINGS: NOT AT ALL
1. FEELING NERVOUS, ANXIOUS, OR ON EDGE: NOT AT ALL
6. BECOMING EASILY ANNOYED OR IRRITABLE: NOT AT ALL
2. NOT BEING ABLE TO STOP OR CONTROL WORRYING: SEVERAL DAYS
IF YOU CHECKED OFF ANY PROBLEMS ON THIS QUESTIONNAIRE, HOW DIFFICULT HAVE THESE PROBLEMS MADE IT FOR YOU TO DO YOUR WORK, TAKE CARE OF THINGS AT HOME, OR GET ALONG WITH OTHER PEOPLE: NOT DIFFICULT AT ALL

## 2022-04-14 ASSESSMENT — PATIENT HEALTH QUESTIONNAIRE - PHQ9
5. POOR APPETITE OR OVEREATING: NOT AT ALL
SUM OF ALL RESPONSES TO PHQ QUESTIONS 1-9: 2

## 2022-04-14 NOTE — PROGRESS NOTES
Assessment & Plan       Recurrent major depressive disorder, in full remission (H)  PHQ 10/8/2021 2/22/2022 4/14/2022   PHQ-9 Total Score 0 3 2   Q9: Thoughts of better off dead/self-harm past 2 weeks Not at all Not at all Not at all   - Symptoms controlled, Deandra is interested in weaning medication. Advised below  Zoloft 25 mg daily for 2 weeks  Zoloft 25 mg every other day for 2 weeks  Ok to stop medication. Please let me know if after stopping medications you are having withdrawal symptoms. We can then do a further taper.  - sertraline (ZOLOFT) 25 MG tablet; Take 1 tablet (25 mg) by mouth daily for 14 days, THEN 1 tablet (25 mg) every other day for 14 days.  Dispense: 21 tablet; Refill: 0    Insomnia, unspecified type  - Previously controlled, symptoms not controlled due to Zoloft. Continue to monitor.       Dominic Hickman MD  [facility]    Sandra Liriano is a 57 year old who presents for the following health issues     History of Present Illness       Reason for visit:  Follow up visit    She eats 2-3 servings of fruits and vegetables daily.She consumes 0 sweetened beverage(s) daily.She exercises with enough effort to increase her heart rate 60 or more minutes per day.  She exercises with enough effort to increase her heart rate 3 or less days per week.   She is taking medications regularly.       She is taking OTC medication for sleep. She feels that it makes her drowsy.  She would like to get off Zoloft. She feels that things are better this year.  She states that her her mood is better. She is not feeling down.       Review of Systems         Objective    There were no vitals taken for this visit.  There is no height or weight on file to calculate BMI.  Physical Exam   /66   Pulse 77   Temp 97.8  F (36.6  C)   Wt 56.2 kg (124 lb)   SpO2 98%   BMI 23.62 kg/m

## 2022-04-14 NOTE — PATIENT INSTRUCTIONS
Zoloft taper -   Zoloft 25 mg daily for 2 weeks  Zoloft 25 mg every other day for 2 weeks  Ok to stop medication. Please let me know if after stopping medications you are having withdrawal symptoms. We can then do a further taper.

## 2022-04-15 ASSESSMENT — ANXIETY QUESTIONNAIRES: GAD7 TOTAL SCORE: 1

## 2022-05-11 ENCOUNTER — VIRTUAL VISIT (OUTPATIENT)
Dept: PSYCHOLOGY | Facility: CLINIC | Age: 57
End: 2022-05-11
Payer: COMMERCIAL

## 2022-05-11 DIAGNOSIS — F33.1 MODERATE EPISODE OF RECURRENT MAJOR DEPRESSIVE DISORDER (H): Primary | ICD-10-CM

## 2022-05-11 PROCEDURE — 90837 PSYTX W PT 60 MINUTES: CPT | Mod: GT | Performed by: MARRIAGE & FAMILY THERAPIST

## 2022-05-12 ASSESSMENT — COLUMBIA-SUICIDE SEVERITY RATING SCALE - C-SSRS
2. HAVE YOU ACTUALLY HAD ANY THOUGHTS OF KILLING YOURSELF?: NO
1. HAVE YOU WISHED YOU WERE DEAD OR WISHED YOU COULD GO TO SLEEP AND NOT WAKE UP?: NO
ATTEMPT LIFETIME: NO
6. HAVE YOU EVER DONE ANYTHING, STARTED TO DO ANYTHING, OR PREPARED TO DO ANYTHING TO END YOUR LIFE?: NO
TOTAL  NUMBER OF INTERRUPTED ATTEMPTS LIFETIME: NO
TOTAL  NUMBER OF ABORTED OR SELF INTERRUPTED ATTEMPTS LIFETIME: NO

## 2022-05-12 ASSESSMENT — PATIENT HEALTH QUESTIONNAIRE - PHQ9: SUM OF ALL RESPONSES TO PHQ QUESTIONS 1-9: 3

## 2022-05-12 NOTE — PROGRESS NOTES
[facility]                                     Progress Note    Patient Name: Deandra Colón  Date: 5/12/2022         Service Type: Individual      Session Start Time: 5:00PM  Session End Time: 5:55PM     Session Length: 55 minutes    Session #: 14    Attendees: Client attended alone    Service Modality:  Video Visit:      Provider verified identity through the following two step process.  Patient provided:  Patient photo    Telemedicine Visit: The patient's condition can be safely assessed and treated via synchronous audio and visual telemedicine encounter.      Reason for Telemedicine Visit: Patient has requested telehealth visit    Originating Site (Patient Location): Patient's home    Distant Site (Provider Location): Provider Remote Setting- Home Office    Consent:  The patient/guardian has verbally consented to: the potential risks and benefits of telemedicine (video visit) versus in person care; bill my insurance or make self-payment for services provided; and responsibility for payment of non-covered services.     Patient would like the video invitation sent by:  My Chart    Mode of Communication:  Video Conference via Amwell    As the provider I attest to compliance with applicable laws and regulations related to telemedicine.    DATA  Extended Session (53+ minutes):   - High distress and under complex circumstances.  See Data section for details  Interactive Complexity: No  Crisis: No        Progress Since Last Session (Related to Symptoms / Goals / Homework):   Symptoms: Worsening was fired from job    Homework: Partially completed      Episode of Care Goals: No improvement - PREPARATION (Decided to change - considering how); Intervened by negotiating a change plan and determining options / strategies for behavior change, identifying triggers, exploring social supports, and working towards setting a date to begin behavior change     Current / Ongoing Stressors and Concerns:   Client  was fired from her job and she is very stressed and worried about next steps. She is very angry with the circumstances and her supervisor. Processed the events that led up to this and helped validate clients feelings.     Treatment Objective(s) Addressed in This Session:   Identify negative self-talk and behaviors: challenge core beliefs, myths, and actions  Job search     Intervention:   CBT: mindfulness and manage self talk  Solution Focused: job search    Assessments completed prior to visit:  The following assessments were completed by patient for this visit:  PHQ9:   PHQ-9 SCORE 10/10/2019 8/13/2021 10/8/2021 2/22/2022 4/14/2022 5/12/2022   PHQ-9 Total Score MyChart - - 0 3 (Minimal depression) - -   PHQ-9 Total Score 4 3 0 3 2 3     GAD7:   PILAR-7 SCORE 10/10/2019 8/13/2021 10/8/2021 4/14/2022   Total Score - - 0 (minimal anxiety) -   Total Score 4 4 0 1     PROMIS 10-Global Health (all questions and answers displayed):   PROMIS 10 2/22/2022 5/12/2022   In general, would you say your health is: Very good -   In general, would you say your quality of life is: Very good -   In general, how would you rate your physical health? Very good -   In general, how would you rate your mental health, including your mood and your ability to think? Very good -   In general, how would you rate your satisfaction with your social activities and relationships? Good -   In general, please rate how well you carry out your usual social activities and roles Very good -   To what extent are you able to carry out your everyday physical activities such as walking, climbing stairs, carrying groceries, or moving a chair? Completely -   How often have you been bothered by emotional problems such as feeling anxious, depressed or irritable? Sometimes -   How would you rate your fatigue on average? Moderate -   How would you rate your pain on average?   0 = No Pain  to  10 = Worst Imaginable Pain 8 -   In general, would you say your health  is: 4 3   In general, would you say your quality of life is: 4 3   In general, how would you rate your physical health? 4 3   In general, how would you rate your mental health, including your mood and your ability to think? 4 3   In general, how would you rate your satisfaction with your social activities and relationships? 3 2   In general, please rate how well you carry out your usual social activities and roles. (This includes activities at home, at work and in your community, and responsibilities as a parent, child, spouse, employee, friend, etc.) 4 3   To what extent are you able to carry out your everyday physical activities such as walking, climbing stairs, carrying groceries, or moving a chair? 5 3   In the past 7 days, how often have you been bothered by emotional problems such as feeling anxious, depressed, or irritable? 3 2   In the past 7 days, how would you rate your fatigue on average? 3 2   In the past 7 days, how would you rate your pain on average, where 0 means no pain, and 10 means worst imaginable pain? 8 5   Global Mental Health Score 14 12   Global Physical Health Score 14 13   PROMIS TOTAL - SUBSCORES 28 25   Some recent data might be hidden     PROMIS 10-Global Health (only subscores and total score):   PROMIS-10 Scores Only 2/22/2022 5/12/2022   Global Mental Health Score 14 12   Global Physical Health Score 14 13   PROMIS TOTAL - SUBSCORES 28 25     [place] Suicide Severity Rating Scale (Lifetime/Recent)  [place] Suicide Severity Rating (Lifetime/Recent) 8/26/2021 5/12/2022   Wish to be Dead (Lifetime) No -   Non-Specific Active Suicidal Thoughts (Lifetime) No -   Most Severe Ideation Rating (Lifetime) NA -   Frequency (Lifetime) NA -   Duration (Lifetime) NA -   Controllability (Lifetime) NA -   Protective Factors  (Lifetime) NA -   Reasons for Ideation (Lifetime) NA -   RETIRED: 1. Wish to be Dead (Recent) No -   RETIRED: 2. Non-Specific Active Suicidal Thoughts (Recent) No -   3.  Active Suicidal Ideation with any Methods (Not Plan) Without Intent to Act (Lifetime) No -   RETIRED: 3. Active Suicidal Ideation with any Methods (Not Plan) Without Intent to Act (Recent) No -   RETIRE: 4. Active Suicidal Ideation with Some Intent to Act, Without Specific Plan (Lifetime) No -   4. Active Suicidal Ideation with Some Intent to Act, Without Specific Plan (Recent) No -   RETIRE: 5. Active Suicidal Ideation with Specific Plan and Intent (Lifetime) No -   RETIRED: 5. Active Suicidal Ideation with Specific Plan and Intent (Recent) No -   Most Severe Ideation Rating (Past Month) NA -   Frequency (Past Month) NA -   Duration (Past Month) NA -   Controllability (Past Month) NA -   Protective Factors (Past Month) NA -   Reasons for Ideation (Past Month) NA -   Actual Attempt (Lifetime) No -   Actual Attempt (Past 3 Months) No -   Has subject engaged in non-suicidal self-injurious behavior? (Lifetime) No -   Has subject engaged in non-suicidal self-injurious behavior? (Past 3 Months) No -   Interrupted Attempts (Lifetime) No -   Interrupted Attempts (Past 3 Months) No -   Aborted or Self-Interrupted Attempt (Lifetime) No -   Aborted or Self-Interrupted Attempt (Past 3 Months) No -   Preparatory Acts or Behavior (Lifetime) No -   Preparatory Acts or Behavior (Past 3 Months) No -   Most Recent Attempt Actual Lethality Code NA -   Most Lethal Attempt Actual Lethality Code NA -   Initial/First Attempt Actual Lethality Code NA -   1. Wish to be Dead (Lifetime) - 0   2. Non-Specific Active Suicidal Thoughts (Lifetime) - 0   Actual Attempt (Lifetime) - 0   Has subject engaged in non-suicidal self-injurious behavior? (Lifetime) - 0   Interrupted Attempts (Lifetime) - 0   Aborted or Self-Interrupted Attempt (Lifetime) - 0   Preparatory Acts or Behavior (Lifetime) - 0   Calculated C-SSRS Risk Score (Lifetime/Recent) - No Risk Indicated         ASSESSMENT: Current Emotional / Mental Status (status of significant  symptoms):   Risk status (Self / Other harm or suicidal ideation)   Patient denies current fears or concerns for personal safety.   Patient denies current or recent suicidal ideation or behaviors.   Patient denies current or recent homicidal ideation or behaviors.   Patient denies current or recent self injurious behavior or ideation.   Patient denies other safety concerns.   Patient reports there has been no change in risk factors since their last session.     Patient reports there has been no change in protective factors since their last session.     Recommended that patient call 911 or go to the local ED should there be a change in any of these risk factors.     Appearance:   Appropriate    Eye Contact:   Good    Psychomotor Behavior: Normal    Attitude:   Interested Friendly Pleasant   Orientation:   All   Speech    Rate / Production: Normal/ Responsive Emotional Talkative    Volume:  Normal    Mood:    Angry  Anxious  Depressed  Irritable  Sad  Panicked Fearful   Affect:    Appropriate  Worrisome    Thought Content:  Clear    Thought Form:  Coherent  Goal Directed  Logical    Insight:    Good      Medication Review:   No changes to current psychiatric medication(s)     Medication Compliance:   Yes     Changes in Health Issues:   None reported     Chemical Use Review:   Substance Use: Chemical use reviewed, no active concerns identified      Tobacco Use: No current tobacco use.      Diagnosis:  1. Moderate episode of recurrent major depressive disorder (H)        Collateral Reports Completed:   Not Applicable    PLAN: (Patient Tasks / Therapist Tasks / Other)  Homework: Client to seek support from friends and start job search (consider possible [employer] offer).        Elizabeth Ivy                                                         ______________________________________________________________________                                              Individual Treatment Plan    Patient's Name: Deandra MANRIQUEZ  Katarzyna  YOB: 1965    Date of Creation: 5/12/2022  Date Treatment Plan Last Reviewed/Revised: 5/12/2022    DSM5 Diagnoses: 296.32 (F33.1) Major Depressive Disorder, Recurrent Episode, Moderate _ and With anxious distress  Psychosocial / Contextual Factors: Job loss, single, financial  PROMIS (reviewed every 90 days): 5/12/2022 Score: 25    Referral / Collaboration:  Referral to another professional/service is not indicated at this time..    Anticipated number of session for this episode of care: 12  Anticipation frequency of session: Monthly  Anticipated Duration of each session: 38-52 minutes  Treatment plan will be reviewed in 90 days or when goals have been changed.     MeasurableTreatment Goal(s) related to diagnosis / functional impairment(s)  Goal 1: Client will lower PHQ9 score to 3 or below.    I will know I've met my goal when I'm less depressed.      Objective #A (Client Action)    Client will Increase interest, engagement, and pleasure in doing things.  Status: New - Date(s): 5/12/2022     Intervention(s)  Therapist will assign homework to increase activity level.    Objective #B  Client will work on finding new job.  Status: New - Date(s): 5/12/2022     Intervention(s)  Therapist will encourage client in job search.    Objective #C  Client will Identify negative self-talk and behaviors: challenge core beliefs, myths, and actions.  Status: New - Date(s): 5/12/2022     Intervention(s)  Therapist will teach emotional recognition/identification. and improve self talk.        Patient has reviewed and agreed to the above plan.      Elizabeth Ivy  May 12, 2022

## 2022-06-08 ENCOUNTER — VIRTUAL VISIT (OUTPATIENT)
Dept: PSYCHOLOGY | Facility: CLINIC | Age: 57
End: 2022-06-08
Payer: COMMERCIAL

## 2022-06-08 DIAGNOSIS — F32.1 EPISODE OF MODERATE MAJOR DEPRESSION (H): Primary | ICD-10-CM

## 2022-06-08 PROCEDURE — 90834 PSYTX W PT 45 MINUTES: CPT | Mod: GT | Performed by: MARRIAGE & FAMILY THERAPIST

## 2022-06-08 NOTE — PROGRESS NOTES
[facility]                                     Progress Note    Patient Name: Deandra Colón  Date: 6/8/2022         Service Type: Individual      Session Start Time: 1:15PM  Session End Time: 2:00PM     Session Length: 45 minutes    Session #: 15    Attendees: Client attended alone    Service Modality:  Video Visit:      Provider verified identity through the following two step process.  Patient provided:  Patient photo    Telemedicine Visit: The patient's condition can be safely assessed and treated via synchronous audio and visual telemedicine encounter.      Reason for Telemedicine Visit: Patient has requested telehealth visit    Originating Site (Patient Location): Patient's home    Distant Site (Provider Location): Provider Remote Setting- Home Office    Consent:  The patient/guardian has verbally consented to: the potential risks and benefits of telemedicine (video visit) versus in person care; bill my insurance or make self-payment for services provided; and responsibility for payment of non-covered services.     Patient would like the video invitation sent by:  My Chart    Mode of Communication:  Video Conference via Amwell    As the provider I attest to compliance with applicable laws and regulations related to telemedicine.    DATA  Interactive Complexity: No  Crisis: No        Progress Since Last Session (Related to Symptoms / Goals / Homework):   Symptoms: Improving took a new job through [employer]    Homework: Partially completed      Episode of Care Goals: Satisfactory progress - ACTION (Actively working towards change); Intervened by reinforcing change plan / affirming steps taken     Current / Ongoing Stressors and Concerns:   Client was transferred to a new job where she had to take a pay cut at $20K. She is upset about the loss of income but pleased by the ease of the transfer. She continues to be upset with her former supervisor who has asked her to work 5 days a week  versus 4 which she previously had agreed to. Client did talk to the  in HR about her frustrations which went well and she feels good to have it on the record.     Treatment Objective(s) Addressed in This Session:   Identify negative self-talk and behaviors: challenge core beliefs, myths, and actions     Intervention:   CBT: mindfulness and manage self talk  Solution Focused: job search    Assessments completed prior to visit:  The following assessments were completed by patient for this visit:    PROMIS 10-Global Health (all questions and answers displayed):   PROMIS 10 2/22/2022 5/12/2022   In general, would you say your health is: Very good -   In general, would you say your quality of life is: Very good -   In general, how would you rate your physical health? Very good -   In general, how would you rate your mental health, including your mood and your ability to think? Very good -   In general, how would you rate your satisfaction with your social activities and relationships? Good -   In general, please rate how well you carry out your usual social activities and roles Very good -   To what extent are you able to carry out your everyday physical activities such as walking, climbing stairs, carrying groceries, or moving a chair? Completely -   How often have you been bothered by emotional problems such as feeling anxious, depressed or irritable? Sometimes -   How would you rate your fatigue on average? Moderate -   How would you rate your pain on average?   0 = No Pain  to  10 = Worst Imaginable Pain 8 -   In general, would you say your health is: 4 3   In general, would you say your quality of life is: 4 3   In general, how would you rate your physical health? 4 3   In general, how would you rate your mental health, including your mood and your ability to think? 4 3   In general, how would you rate your satisfaction with your social activities and relationships? 3 2   In general, please rate how well you carry  out your usual social activities and roles. (This includes activities at home, at work and in your community, and responsibilities as a parent, child, spouse, employee, friend, etc.) 4 3   To what extent are you able to carry out your everyday physical activities such as walking, climbing stairs, carrying groceries, or moving a chair? 5 3   In the past 7 days, how often have you been bothered by emotional problems such as feeling anxious, depressed, or irritable? 3 2   In the past 7 days, how would you rate your fatigue on average? 3 2   In the past 7 days, how would you rate your pain on average, where 0 means no pain, and 10 means worst imaginable pain? 8 5   Global Mental Health Score 14 12   Global Physical Health Score 14 13   PROMIS TOTAL - SUBSCORES 28 25   Some recent data might be hidden     PROMIS 10-Global Health (only subscores and total score):   PROMIS-10 Scores Only 2/22/2022 5/12/2022   Global Mental Health Score 14 12   Global Physical Health Score 14 13   PROMIS TOTAL - SUBSCORES 28 25         ASSESSMENT: Current Emotional / Mental Status (status of significant symptoms):   Risk status (Self / Other harm or suicidal ideation)   Patient denies current fears or concerns for personal safety.   Patient denies current or recent suicidal ideation or behaviors.   Patient denies current or recent homicidal ideation or behaviors.   Patient denies current or recent self injurious behavior or ideation.   Patient denies other safety concerns.   Patient reports there has been no change in risk factors since their last session.     Patient reports there has been no change in protective factors since their last session.     Recommended that patient call 911 or go to the local ED should there be a change in any of these risk factors.     Appearance:   Appropriate    Eye Contact:   Good    Psychomotor Behavior: Normal    Attitude:   Interested Friendly Pleasant   Orientation:   All   Speech    Rate /  Production: Normal/ Responsive Talkative    Volume:  Normal    Mood:    Elevated  Irritable    Affect:    Appropriate    Thought Content:  Clear    Thought Form:  Coherent  Goal Directed  Logical    Insight:    Good      Medication Review:   No changes to current psychiatric medication(s)     Medication Compliance:   Yes     Changes in Health Issues:   None reported     Chemical Use Review:   Substance Use: Chemical use reviewed, no active concerns identified      Tobacco Use: No current tobacco use.      Diagnosis:  1. Episode of moderate major depression (H)        Collateral Reports Completed:   Not Applicable    PLAN: (Patient Tasks / Therapist Tasks / Other)  Homework: Client to adjust to new work situation and advocate for 4 days a week. Look for cash work.        Elizabeth Vizcaino Horacevernon                                                         ______________________________________________________________________                                              Individual Treatment Plan    Patient's Name: Deandra Colón  YOB: 1965    Date of Creation: 5/12/2022  Date Treatment Plan Last Reviewed/Revised: 5/12/2022    DSM5 Diagnoses: 296.32 (F33.1) Major Depressive Disorder, Recurrent Episode, Moderate _ and With anxious distress  Psychosocial / Contextual Factors: Job loss, single, financial  PROMIS (reviewed every 90 days): 5/12/2022 Score: 25    Referral / Collaboration:  Referral to another professional/service is not indicated at this time..    Anticipated number of session for this episode of care: 12  Anticipation frequency of session: Monthly  Anticipated Duration of each session: 38-52 minutes  Treatment plan will be reviewed in 90 days or when goals have been changed.     MeasurableTreatment Goal(s) related to diagnosis / functional impairment(s)  Goal 1: Client will lower PHQ9 score to 3 or below.    I will know I've met my goal when I'm less depressed.      Objective #A (Client  Action)    Client will Increase interest, engagement, and pleasure in doing things.  Status: New - Date(s): 5/12/2022     Intervention(s)  Therapist will assign homework to increase activity level.    Objective #B  Client will work on finding new job.  Status: New - Date(s): 5/12/2022     Intervention(s)  Therapist will encourage client in job search.    Objective #C  Client will Identify negative self-talk and behaviors: challenge core beliefs, myths, and actions.  Status: New - Date(s): 5/12/2022     Intervention(s)  Therapist will teach emotional recognition/identification. and improve self talk.        Patient has reviewed and agreed to the above plan.      Elizabeth Ivy  May 12, 2022

## 2022-07-19 DIAGNOSIS — M67.911 TENDINOPATHY OF RIGHT ROTATOR CUFF: Primary | ICD-10-CM

## 2022-07-19 RX ORDER — NAPROXEN 500 MG/1
500 TABLET ORAL 2 TIMES DAILY PRN
Qty: 60 TABLET | Refills: 0 | Status: SHIPPED | OUTPATIENT
Start: 2022-07-19 | End: 2022-08-18

## 2022-07-27 ENCOUNTER — VIRTUAL VISIT (OUTPATIENT)
Dept: PSYCHOLOGY | Facility: CLINIC | Age: 57
End: 2022-07-27
Payer: COMMERCIAL

## 2022-07-27 DIAGNOSIS — F32.1 MODERATE MAJOR DEPRESSION (H): Primary | ICD-10-CM

## 2022-07-27 PROCEDURE — 90837 PSYTX W PT 60 MINUTES: CPT | Mod: GT | Performed by: MARRIAGE & FAMILY THERAPIST

## 2022-07-28 NOTE — PROGRESS NOTES
[facility]                                     Progress Note    Patient Name: Deandra Colón  Date: 7/27/2022         Service Type: Individual      Session Start Time: 5:00PM  Session End Time: 5:55PM     Session Length: 55 minutes    Session #: 16    Attendees: Client attended alone    Service Modality:  Video Visit:      Provider verified identity through the following two step process.  Patient provided:  Patient photo    Telemedicine Visit: The patient's condition can be safely assessed and treated via synchronous audio and visual telemedicine encounter.      Reason for Telemedicine Visit: Patient has requested telehealth visit    Originating Site (Patient Location): Patient's home    Distant Site (Provider Location): Provider Remote Setting- Home Office    Consent:  The patient/guardian has verbally consented to: the potential risks and benefits of telemedicine (video visit) versus in person care; bill my insurance or make self-payment for services provided; and responsibility for payment of non-covered services.     Patient would like the video invitation sent by:  My Chart    Mode of Communication:  Video Conference via Amwell    As the provider I attest to compliance with applicable laws and regulations related to telemedicine.    DATA  Interactive Complexity: No  Crisis: No        Progress Since Last Session (Related to Symptoms / Goals / Homework):   Symptoms: Worsening financial stress    Homework: Partially completed      Episode of Care Goals: Minimal progress - ACTION (Actively working towards change); Intervened by reinforcing change plan / affirming steps taken     Current / Ongoing Stressors and Concerns:  Client is realizing in her paycheck the significant change financially with her new job. She has felt increased anger at her supervisor because of this and processes those feelings. She did find a side job for this weekend to help and will likely need to continue to look for  additional sources of income. She was also frustrated by a conversation with her mother today and stated her mother doesn't really care about her and is selfish. Client is feelign very unlucky at this point and trying to move forward. Says she will keep her eyes open for other jobs at the [employer]. She is doing a lot of [employer] and being social which is helpful.       Treatment Objective(s) Addressed in This Session:   Identify negative self-talk and behaviors: challenge core beliefs, myths, and actions     Intervention:   CBT: mindfulness and manage self talk  Solution Focused: job search    Assessments completed prior to visit:  The following assessments were completed by patient for this visit:    PROMIS 10-Global Health (all questions and answers displayed):   PROMIS 10 2/22/2022 5/12/2022   In general, would you say your health is: Very good -   In general, would you say your quality of life is: Very good -   In general, how would you rate your physical health? Very good -   In general, how would you rate your mental health, including your mood and your ability to think? Very good -   In general, how would you rate your satisfaction with your social activities and relationships? Good -   In general, please rate how well you carry out your usual social activities and roles Very good -   To what extent are you able to carry out your everyday physical activities such as walking, climbing stairs, carrying groceries, or moving a chair? Completely -   How often have you been bothered by emotional problems such as feeling anxious, depressed or irritable? Sometimes -   How would you rate your fatigue on average? Moderate -   How would you rate your pain on average?   0 = No Pain  to  10 = Worst Imaginable Pain 8 -   In general, would you say your health is: 4 3   In general, would you say your quality of life is: 4 3   In general, how would you rate your physical health? 4 3   In general, how would you rate your mental  health, including your mood and your ability to think? 4 3   In general, how would you rate your satisfaction with your social activities and relationships? 3 2   In general, please rate how well you carry out your usual social activities and roles. (This includes activities at home, at work and in your community, and responsibilities as a parent, child, spouse, employee, friend, etc.) 4 3   To what extent are you able to carry out your everyday physical activities such as walking, climbing stairs, carrying groceries, or moving a chair? 5 3   In the past 7 days, how often have you been bothered by emotional problems such as feeling anxious, depressed, or irritable? 3 2   In the past 7 days, how would you rate your fatigue on average? 3 2   In the past 7 days, how would you rate your pain on average, where 0 means no pain, and 10 means worst imaginable pain? 8 5   Global Mental Health Score 14 12   Global Physical Health Score 14 13   PROMIS TOTAL - SUBSCORES 28 25   Some recent data might be hidden     PROMIS 10-Global Health (only subscores and total score):   PROMIS-10 Scores Only 2/22/2022 5/12/2022   Global Mental Health Score 14 12   Global Physical Health Score 14 13   PROMIS TOTAL - SUBSCORES 28 25         ASSESSMENT: Current Emotional / Mental Status (status of significant symptoms):   Risk status (Self / Other harm or suicidal ideation)   Patient denies current fears or concerns for personal safety.   Patient denies current or recent suicidal ideation or behaviors.   Patient denies current or recent homicidal ideation or behaviors.   Patient denies current or recent self injurious behavior or ideation.   Patient denies other safety concerns.   Patient reports there has been no change in risk factors since their last session.     Patient reports there has been no change in protective factors since their last session.     Recommended that patient call 911 or go to the local ED should there be a change in any  of these risk factors.     Appearance:   Appropriate    Eye Contact:   Good    Psychomotor Behavior: Normal    Attitude:   Interested Friendly Pleasant   Orientation:   All   Speech    Rate / Production: Normal/ Responsive Talkative    Volume:  Normal    Mood:    Elevated  Irritable    Affect:    Appropriate    Thought Content:  Clear    Thought Form:  Coherent  Goal Directed  Logical    Insight:    Good      Medication Review:   No current psychiatric medications prescribed     Medication Compliance:   NA     Changes in Health Issues:   None reported     Chemical Use Review:   Substance Use: Chemical use reviewed, no active concerns identified      Tobacco Use: No current tobacco use.      Diagnosis:  1. Moderate major depression (H)        Collateral Reports Completed:   Not Applicable    PLAN: (Patient Tasks / Therapist Tasks / Other)  Homework: Client to continue to look for alternate sources of income.        Elizabeth Ivy                                                         ______________________________________________________________________                                              Individual Treatment Plan    Patient's Name: Deandra Colón  YOB: 1965    Date of Creation: 5/12/2022  Date Treatment Plan Last Reviewed/Revised: 5/12/2022    DSM5 Diagnoses: 296.32 (F33.1) Major Depressive Disorder, Recurrent Episode, Moderate _ and With anxious distress  Psychosocial / Contextual Factors: Job loss, single, financial  PROMIS (reviewed every 90 days): 5/12/2022 Score: 25    Referral / Collaboration:  Referral to another professional/service is not indicated at this time..    Anticipated number of session for this episode of care: 12  Anticipation frequency of session: Monthly  Anticipated Duration of each session: 38-52 minutes  Treatment plan will be reviewed in 90 days or when goals have been changed.     MeasurableTreatment Goal(s) related to diagnosis / functional  impairment(s)  Goal 1: Client will lower PHQ9 score to 3 or below.    I will know I've met my goal when I'm less depressed.      Objective #A (Client Action)    Client will Increase interest, engagement, and pleasure in doing things.  Status: New - Date(s): 5/12/2022     Intervention(s)  Therapist will assign homework to increase activity level.    Objective #B  Client will work on finding new job.  Status: New - Date(s): 5/12/2022     Intervention(s)  Therapist will encourage client in job search.    Objective #C  Client will Identify negative self-talk and behaviors: challenge core beliefs, myths, and actions.  Status: New - Date(s): 5/12/2022     Intervention(s)  Therapist will teach emotional recognition/identification. and improve self talk.        Patient has reviewed and agreed to the above plan.      Elizabeth Ivy  May 12, 2022

## 2022-09-18 ENCOUNTER — HEALTH MAINTENANCE LETTER (OUTPATIENT)
Age: 57
End: 2022-09-18

## 2022-10-05 ENCOUNTER — VIRTUAL VISIT (OUTPATIENT)
Dept: PSYCHOLOGY | Facility: CLINIC | Age: 57
End: 2022-10-05
Payer: COMMERCIAL

## 2022-10-05 DIAGNOSIS — F33.1 MODERATE EPISODE OF RECURRENT MAJOR DEPRESSIVE DISORDER (H): Primary | ICD-10-CM

## 2022-10-05 PROCEDURE — 90837 PSYTX W PT 60 MINUTES: CPT | Mod: GT | Performed by: MARRIAGE & FAMILY THERAPIST

## 2022-10-05 ASSESSMENT — PATIENT HEALTH QUESTIONNAIRE - PHQ9
SUM OF ALL RESPONSES TO PHQ QUESTIONS 1-9: 3
10. IF YOU CHECKED OFF ANY PROBLEMS, HOW DIFFICULT HAVE THESE PROBLEMS MADE IT FOR YOU TO DO YOUR WORK, TAKE CARE OF THINGS AT HOME, OR GET ALONG WITH OTHER PEOPLE: NOT DIFFICULT AT ALL
SUM OF ALL RESPONSES TO PHQ QUESTIONS 1-9: 3

## 2022-10-06 NOTE — PROGRESS NOTES
[facility]                                     Progress Note    Patient Name: Deandra Colón  Date: 10/5/2022         Service Type: Individual      Session Start Time: 3:00PM  Session End Time: 3:55PM     Session Length: 55 minutes    Session #: 17    Attendees: Client attended alone    Service Modality:  Video Visit:      Provider verified identity through the following two step process.  Patient provided:  Patient photo    Telemedicine Visit: The patient's condition can be safely assessed and treated via synchronous audio and visual telemedicine encounter.      Reason for Telemedicine Visit: Patient has requested telehealth visit    Originating Site (Patient Location): Patient's home    Distant Site (Provider Location): Provider Remote Setting- Home Office    Consent:  The patient/guardian has verbally consented to: the potential risks and benefits of telemedicine (video visit) versus in person care; bill my insurance or make self-payment for services provided; and responsibility for payment of non-covered services.     Patient would like the video invitation sent by:  My Chart    Mode of Communication:  Video Conference via Amwell    As the provider I attest to compliance with applicable laws and regulations related to telemedicine.    DATA  Extended Session (53+ minutes):   - Longer session due to limited access to mental health appointments and necessity to address patient's distress / complexity  Interactive Complexity: No  Crisis: No      Progress Since Last Session (Related to Symptoms / Goals / Homework):   Symptoms: No change ongoing financial stress and frustration with her circumstances    Homework: Partially completed      Episode of Care Goals: Minimal progress - ACTION (Actively working towards change); Intervened by reinforcing change plan / affirming steps taken     Current / Ongoing Stressors and Concerns:  Client is realizing in her paycheck the significant change  financially with her new job. She has felt increased anger at her circumstances and a sense of being stuck. Talked about options including having a roommate or moving in with her cousins who live in [place]. Client feels unlucky in many ways and also without real support especially from family. She is in the process of helping her friend who passed away's son, Artemio with a job search. She also wouldn't want to leave [place] until his sister graduates high school in 2 years. Encouraged client to realize she does have options even if they don't seem ideal. She is working a part-time job and doing a lot of Rosal[place] and being social which is helpful.       Treatment Objective(s) Addressed in This Session:   Identify negative self-talk and behaviors: challenge core beliefs, myths, and actions     Intervention:   CBT: mindfulness and manage self talk  Solution Focused: job search    Assessments completed prior to visit:  The following assessments were completed by patient for this visit:  PATIENT HEALTH QUESTIONNAIRE-9 (PHQ - 9)    Over the last 2 weeks, how often have you been bothered by any of the following problems?    1. Little interest or pleasure in doing things -  Not at all   2. Feeling down, depressed, or hopeless -  Several days   3. Trouble falling or staying asleep, or sleeping too much - Several days   4. Feeling tired or having little energy -  Not at all   5. Poor appetite or overeating -  Not at all   6. Feeling bad about yourself - or that you are a failure or have let yourself or your family down -  Several days   7. Trouble concentrating on things, such as reading the newspaper or watching television - Not at all   8. Moving or speaking so slowly that other people could have noticed? Or the opposite - being so fidgety or restless that you have been moving around a lot more than usual Not at all   9. Thoughts that you would be better off dead or of hurting  yourself in some way Not at all   Total Score: 3     If  you checked off any problems, how difficult have these problems made it for you to do your work, take care of things at home, or get along with other people?      Developed by Leola Valenzuela, Glory White, Vance Desai and colleagues, with an educational yesica from [employer] No permission required to reproduce, translate, display or distribute. permission required to reproduce, translate, display or distribute.    PROMIS 10-Global Health (all questions and answers displayed):   PROMIS 10 2/22/2022 5/12/2022 10/5/2022   In general, would you say your health is: Very good - Very good   In general, would you say your quality of life is: Very good - Good   In general, how would you rate your physical health? Very good - Very good   In general, how would you rate your mental health, including your mood and your ability to think? Very good - Very good   In general, how would you rate your satisfaction with your social activities and relationships? Good - Fair   In general, please rate how well you carry out your usual social activities and roles Very good - Very good   To what extent are you able to carry out your everyday physical activities such as walking, climbing stairs, carrying groceries, or moving a chair? Completely - Completely   How often have you been bothered by emotional problems such as feeling anxious, depressed or irritable? Sometimes - Sometimes   How would you rate your fatigue on average? Moderate - Mild   How would you rate your pain on average?   0 = No Pain  to  10 = Worst Imaginable Pain 8 - 7   In general, would you say your health is: 4 3 4   In general, would you say your quality of life is: 4 3 3   In general, how would you rate your physical health? 4 3 4   In general, how would you rate your mental health, including your mood and your ability to think? 4 3 4   In general, how would you rate your satisfaction with your social activities and relationships? 3 2 2   In general,  please rate how well you carry out your usual social activities and roles. (This includes activities at home, at work and in your community, and responsibilities as a parent, child, spouse, employee, friend, etc.) 4 3 4   To what extent are you able to carry out your everyday physical activities such as walking, climbing stairs, carrying groceries, or moving a chair? 5 3 5   In the past 7 days, how often have you been bothered by emotional problems such as feeling anxious, depressed, or irritable? 3 2 3   In the past 7 days, how would you rate your fatigue on average? 3 2 2   In the past 7 days, how would you rate your pain on average, where 0 means no pain, and 10 means worst imaginable pain? 8 5 7   Global Mental Health Score 14 12 12   Global Physical Health Score 14 13 15   PROMIS TOTAL - SUBSCORES 28 25 27   Some recent data might be hidden     PROMIS 10-Global Health (only subscores and total score):   PROMIS-10 Scores Only 2/22/2022 5/12/2022 10/5/2022   Global Mental Health Score 14 12 12   Global Physical Health Score 14 13 15   PROMIS TOTAL - SUBSCORES 28 25 27         ASSESSMENT: Current Emotional / Mental Status (status of significant symptoms):   Risk status (Self / Other harm or suicidal ideation)   Patient denies current fears or concerns for personal safety.   Patient denies current or recent suicidal ideation or behaviors.   Patient denies current or recent homicidal ideation or behaviors.   Patient denies current or recent self injurious behavior or ideation.   Patient denies other safety concerns.   Patient reports there has been no change in risk factors since their last session.     Patient reports there has been no change in protective factors since their last session.     Recommended that patient call 911 or go to the local ED should there be a change in any of these risk factors.     Appearance:   Appropriate    Eye Contact:   Good    Psychomotor Behavior: Normal    Attitude:   Interested  Friendly Pleasant   Orientation:   All   Speech    Rate / Production: Normal/ Responsive Talkative    Volume:  Normal    Mood:    Elevated  Irritable    Affect:    Appropriate    Thought Content:  Clear    Thought Form:  Coherent  Goal Directed  Logical    Insight:    Good      Medication Review:   No current psychiatric medications prescribed     Medication Compliance:   NA     Changes in Health Issues:   None reported     Chemical Use Review:   Substance Use: Chemical use reviewed, no active concerns identified      Tobacco Use: No current tobacco use.      Diagnosis:  1. Moderate episode of recurrent major depressive disorder (H)        Collateral Reports Completed:   Not Applicable    PLAN: (Patient Tasks / Therapist Tasks / Other)  Homework: Client to discontinue sending money to her mother for now and consider her options regarding living situation.        Elizabeth MANRIQUEZ Horacevernon                                                         ______________________________________________________________________                                              Individual Treatment Plan    Patient's Name: Deandra Colón  YOB: 1965    Date of Creation: 5/12/2022  Date Treatment Plan Last Reviewed/Revised: 10/5/2022    DSM5 Diagnoses: 296.32 (F33.1) Major Depressive Disorder, Recurrent Episode, Moderate _ and With anxious distress  Psychosocial / Contextual Factors: Job loss, single, financial  PROMIS (reviewed every 90 days): 10/5/2022 Score: 27    Referral / Collaboration:  Referral to another professional/service is not indicated at this time.    Anticipated number of session for this episode of care: 12  Anticipation frequency of session: Every 2-3 months  Anticipated Duration of each session: 38-52 minutes  Treatment plan will be reviewed in 90 days or when goals have been changed.     MeasurableTreatment Goal(s) related to diagnosis / functional impairment(s)  Goal 1: Client will lower PHQ9 score to 3 or  below.    I will know I've met my goal when I'm less depressed.      Objective #A (Client Action)    Client will Increase interest, engagement, and pleasure in doing things.  Status: New - Date(s): 10/5/2022     Intervention(s)  Therapist will assign homework to increase activity level.    Objective #B  Client will work on finding new job.  Status: New - Date(s): 10/5/2022     Intervention(s)  Therapist will encourage client in job search.    Objective #C  Client will Identify negative self-talk and behaviors: challenge core beliefs, myths, and actions.  Status: New - Date(s): 10/5/2022     Intervention(s)  Therapist will teach emotional recognition/identification. and improve self talk.        Patient has reviewed and agreed to the above plan.      Elizabeth Ivy  October 5, 2022

## 2022-11-10 ENCOUNTER — VIRTUAL VISIT (OUTPATIENT)
Dept: PSYCHOLOGY | Facility: CLINIC | Age: 57
End: 2022-11-10
Payer: COMMERCIAL

## 2022-11-10 ENCOUNTER — MYC REFILL (OUTPATIENT)
Dept: FAMILY MEDICINE | Facility: CLINIC | Age: 57
End: 2022-11-10

## 2022-11-10 DIAGNOSIS — F41.1 GAD (GENERALIZED ANXIETY DISORDER): Primary | ICD-10-CM

## 2022-11-10 DIAGNOSIS — F33.42 RECURRENT MAJOR DEPRESSIVE DISORDER, IN FULL REMISSION (H): ICD-10-CM

## 2022-11-10 PROCEDURE — 90837 PSYTX W PT 60 MINUTES: CPT | Mod: GT | Performed by: MARRIAGE & FAMILY THERAPIST

## 2022-11-10 NOTE — Clinical Note
Cole Hickman, I am meeting with Deandra for counseling. I encouraged her to message you about refilling her selective serotonin reuptake inhibitor. She responded well to it before and I've encouraged her to restart it as soon as she can especially in light of winter months. Thank you for collaborating, Elizabeth Ivy, [facility]

## 2022-11-10 NOTE — PROGRESS NOTES
[facility]                                     Progress Note    Patient Name: Deandra Colón  Date: 11/10/2022         Service Type: Individual      Session Start Time: 10:00AM  Session End Time: 10:55AM     Session Length: 55 minutes    Session #: 19    Attendees: Client attended alone    Service Modality:  Video Visit:      Provider verified identity through the following two step process.  Patient provided:  Patient photo    Telemedicine Visit: The patient's condition can be safely assessed and treated via synchronous audio and visual telemedicine encounter.      Reason for Telemedicine Visit: Patient has requested telehealth visit    Originating Site (Patient Location): Patient's home    Distant Site (Provider Location): Provider Remote Setting- Home Office    Consent:  The patient/guardian has verbally consented to: the potential risks and benefits of telemedicine (video visit) versus in person care; bill my insurance or make self-payment for services provided; and responsibility for payment of non-covered services.     Patient would like the video invitation sent by:  My Chart    Mode of Communication:  Video Conference via Amwell    As the provider I attest to compliance with applicable laws and regulations related to telemedicine.    DATA  Extended Session (53+ minutes):   - Longer session due to limited access to mental health appointments and necessity to address patient's distress / complexity  Interactive Complexity: No  Crisis: No      Progress Since Last Session (Related to Symptoms / Goals / Homework):   Symptoms: No change ongoing financial stress and frustration with her circumstances    Homework: Partially completed      Episode of Care Goals: Minimal progress - ACTION (Actively working towards change); Intervened by reinforcing change plan / affirming steps taken     Current / Ongoing Stressors and Concerns:  Client is anxious today because of her financial stress and  ongoing concerns about her job. She is dissatisfied with her current job and unsure how to move forward looking for something else. She also has been frustrated when calling her mother because the service is very bad and it ends up being difficult to talk. She has not been able to send her money which she said her mother understands. Client has not had very much time with friends and says she isn't lonely but somewhat disappointed that she doesn't have companionship like she had before with [employer]. She misses their friendship. Talked about possibly going to visit her cousins in [place] or her friend in [place] as a way to shift her perspective of feeling stuck. Client also to talk to her primary about restarting medication.     Treatment Objective(s) Addressed in This Session:   Identify negative self-talk and behaviors: challenge core beliefs, myths, and actions     Intervention:   CBT: mindfulness and manage self talk  Solution Focused: job search    Assessments completed prior to visit:  The following assessments were completed by patient for this visit: None    PROMIS 10-Global Health (all questions and answers displayed):   PROMIS 10 2/22/2022 5/12/2022 10/5/2022   In general, would you say your health is: Very good - Very good   In general, would you say your quality of life is: Very good - Good   In general, how would you rate your physical health? Very good - Very good   In general, how would you rate your mental health, including your mood and your ability to think? Very good - Very good   In general, how would you rate your satisfaction with your social activities and relationships? Good - Fair   In general, please rate how well you carry out your usual social activities and roles Very good - Very good   To what extent are you able to carry out your everyday physical activities such as walking, climbing stairs, carrying groceries, or moving a chair? Completely - Completely   How often have you been  bothered by emotional problems such as feeling anxious, depressed or irritable? Sometimes - Sometimes   How would you rate your fatigue on average? Moderate - Mild   How would you rate your pain on average?   0 = No Pain  to  10 = Worst Imaginable Pain 8 - 7   In general, would you say your health is: 4 3 4   In general, would you say your quality of life is: 4 3 3   In general, how would you rate your physical health? 4 3 4   In general, how would you rate your mental health, including your mood and your ability to think? 4 3 4   In general, how would you rate your satisfaction with your social activities and relationships? 3 2 2   In general, please rate how well you carry out your usual social activities and roles. (This includes activities at home, at work and in your community, and responsibilities as a parent, child, spouse, employee, friend, etc.) 4 3 4   To what extent are you able to carry out your everyday physical activities such as walking, climbing stairs, carrying groceries, or moving a chair? 5 3 5   In the past 7 days, how often have you been bothered by emotional problems such as feeling anxious, depressed, or irritable? 3 2 3   In the past 7 days, how would you rate your fatigue on average? 3 2 2   In the past 7 days, how would you rate your pain on average, where 0 means no pain, and 10 means worst imaginable pain? 8 5 7   Global Mental Health Score 14 12 12   Global Physical Health Score 14 13 15   PROMIS TOTAL - SUBSCORES 28 25 27   Some recent data might be hidden     PROMIS 10-Global Health (only subscores and total score):   PROMIS-10 Scores Only 2/22/2022 5/12/2022 10/5/2022   Global Mental Health Score 14 12 12   Global Physical Health Score 14 13 15   PROMIS TOTAL - SUBSCORES 28 25 27         ASSESSMENT: Current Emotional / Mental Status (status of significant symptoms):   Risk status (Self / Other harm or suicidal ideation)   Patient denies current fears or concerns for personal  safety.   Patient denies current or recent suicidal ideation or behaviors.   Patient denies current or recent homicidal ideation or behaviors.   Patient denies current or recent self injurious behavior or ideation.   Patient denies other safety concerns.   Patient reports there has been no change in risk factors since their last session.     Patient reports there has been no change in protective factors since their last session.     Recommended that patient call 911 or go to the local ED should there be a change in any of these risk factors.     Appearance:   Appropriate    Eye Contact:   Good    Psychomotor Behavior: Normal    Attitude:   Interested Friendly Pleasant   Orientation:   All   Speech    Rate / Production: Normal/ Responsive Talkative    Volume:  Normal    Mood:    Elevated  Irritable    Affect:    Appropriate    Thought Content:  Clear    Thought Form:  Coherent  Goal Directed  Logical    Insight:    Good      Medication Review:   Changes to psychiatric medications, see updated Medication List in EPIC.      Medication Compliance:   Yes planning to restart her medication     Changes in Health Issues:   None reported     Chemical Use Review:   Substance Use: Chemical use reviewed, no active concerns identified      Tobacco Use: No current tobacco use.      Diagnosis:  1. PILAR (generalized anxiety disorder)        Collateral Reports Completed:   Not Applicable    PLAN: (Patient Tasks / Therapist Tasks / Other)  Homework: Client to consider a trip. Also to ask her Primary about restarting medication.        Elizabeth Ivy                                                         ______________________________________________________________________                                              Individual Treatment Plan    Patient's Name: Deandra Colón  YOB: 1965    Date of Creation: 5/12/2022  Date Treatment Plan Last Reviewed/Revised: 10/5/2022    DSM5 Diagnoses: 300.02 (F41.1) Generalized  Anxiety Disorder  Psychosocial / Contextual Factors: Job loss, single, financial  PROMIS (reviewed every 90 days): 10/5/2022 Score: 27    Referral / Collaboration:  Referral to another professional/service is not indicated at this time.    Anticipated number of session for this episode of care: 12  Anticipation frequency of session: Every 2-3 months  Anticipated Duration of each session: 38-52 minutes  Treatment plan will be reviewed in 90 days or when goals have been changed.     MeasurableTreatment Goal(s) related to diagnosis / functional impairment(s)  Goal 1: Client will lower GAD7 score to 3 or below.    I will know I've met my goal when I'm less anxious.      Objective #A (Client Action)    Client will Increase interest, engagement, and pleasure in doing things.  Status: New - Date(s): 10/5/2022     Intervention(s)  Therapist will assign homework to increase activity level.    Objective #B  Client will work on finding new job.  Status: New - Date(s): 10/5/2022     Intervention(s)  Therapist will encourage client in job search.    Objective #C  Client will Identify negative self-talk and behaviors: challenge core beliefs, myths, and actions.  Status: New - Date(s): 10/5/2022     Intervention(s)  Therapist will teach emotional recognition/identification. and improve self talk.        Patient has reviewed and agreed to the above plan.      Elizabeth Ivy  October 5, 2022   3

## 2022-11-11 RX ORDER — SERTRALINE HYDROCHLORIDE 25 MG/1
TABLET, FILM COATED ORAL
Qty: 21 TABLET | Refills: 0 | OUTPATIENT
Start: 2022-11-11 | End: 2022-12-09

## 2022-11-11 NOTE — TELEPHONE ENCOUNTER
Please see My Chart Medical Advice encounter.  Patient appears to have stopped this medication last spring and I have asked her to clarify for us that whether she is currently on or off the med.  Also which clinic does she consider her primary and which provider.  Armida Ludwig RN  [facility]

## 2022-12-05 ENCOUNTER — VIRTUAL VISIT (OUTPATIENT)
Dept: FAMILY MEDICINE | Facility: CLINIC | Age: 57
End: 2022-12-05
Payer: COMMERCIAL

## 2022-12-05 DIAGNOSIS — F33.0 MILD EPISODE OF RECURRENT MAJOR DEPRESSIVE DISORDER (H): ICD-10-CM

## 2022-12-05 PROCEDURE — 99214 OFFICE O/P EST MOD 30 MIN: CPT | Mod: GT | Performed by: FAMILY MEDICINE

## 2022-12-05 RX ORDER — SERTRALINE HYDROCHLORIDE 25 MG/1
25 TABLET, FILM COATED ORAL DAILY
Qty: 30 TABLET | Refills: 1 | Status: SHIPPED | OUTPATIENT
Start: 2022-12-05 | End: 2023-01-05

## 2022-12-05 NOTE — PROGRESS NOTES
Deandra is a 57 year old who is being evaluated via a billable video visit.      How would you like to obtain your AVS? MyChart  If the video visit is dropped, the invitation should be resent by: Text to cell phone: [phone]  Will anyone else be joining your video visit? No          Assessment & Plan       Mild episode of recurrent major depressive disorder (H)  PHQ 5/12/2022 10/5/2022 11/11/2022   PHQ-9 Total Score 3 3 3   Q9: Thoughts of better off dead/self-harm past 2 weeks Not at all Not at all Not at all   - Interested in restarting Zoloft.   - Advised below  Zoloft 25 mg daily  Please send me an update on symptoms in six weeks and will increase dose if needed.  Follow up in 2 months.   - sertraline (ZOLOFT) 25 MG tablet; Take 1 tablet (25 mg) by mouth daily  Dispense: 30 tablet; Refill: 1      Deqa Vinita Hickman MD  [facility]   Deandra is a 57 year old, presenting for the following health issues:  No chief complaint on file.      History of Present Illness       Reason for visit:  Medication check    She eats 4 or more servings of fruits and vegetables daily.She consumes 0 sweetened beverage(s) daily.She exercises with enough effort to increase her heart rate 30 to 60 minutes per day.  She exercises with enough effort to increase her heart rate 3 or less days per week.   She is taking medications regularly.       She is still exercising.   She is going through stressors - finances and work.       Review of Systems         Objective           Vitals:  No vitals were obtained today due to virtual visit.    Physical Exam   GENERAL: Healthy, alert and no distress  EYES: Eyes grossly normal to inspection.  No discharge or erythema, or obvious scleral/conjunctival abnormalities.  RESP: No audible wheeze, cough, or visible cyanosis.  No visible retractions or increased work of breathing.    SKIN: Visible skin clear. No significant rash, abnormal pigmentation or  lesions.  NEURO: Cranial nerves grossly intact.  Mentation and speech appropriate for age.  PSYCH: Mentation appears normal, affect normal/bright, judgement and insight intact, normal speech and appearance well-groomed.                Video-Visit Details    Video Start Time: 4:50 PM    Type of service:  Video Visit    Video End Time:4:56 PM    Originating Location (pt. Location): Home        Distant Location (provider location):  On-site    Platform used for Video Visit: Jim

## 2022-12-05 NOTE — PATIENT INSTRUCTIONS
Addended by: DELVIS POWELL JR. on: 4/23/2018 10:57 AM     Modules accepted: Orders     Zoloft 25 mg daily  Please send me an update on symptoms in six weeks and will increase dose if needed.  Follow up in 2 months.

## 2023-01-04 DIAGNOSIS — F33.0 MILD EPISODE OF RECURRENT MAJOR DEPRESSIVE DISORDER (H): ICD-10-CM

## 2023-01-05 RX ORDER — SERTRALINE HYDROCHLORIDE 25 MG/1
25 TABLET, FILM COATED ORAL DAILY
Qty: 90 TABLET | Refills: 0 | Status: SHIPPED | OUTPATIENT
Start: 2023-01-05 | End: 2023-08-26

## 2023-01-05 NOTE — TELEPHONE ENCOUNTER
Patient scheduled with Dr. Hickman on 2/6/23.    Prescription approved per [facility] Refill Protocol.    JOSE ARMANDO Jovel, RN-[facility]      Warnings Override History for sertraline (ZOLOFT) 25 MG tablet [[id number]]    Overridden by Dominic Hickman MD on Dec 5, 2022 4:55 PM   Drug-Drug   1. IBUPROFEN / SELECTIVE SEROTONIN REUPTAKE INHIBITORS [Level: Major] [Reason: Tolerated medication/side effects in past]   Other Orders: ibuprofen (ADVIL,MOTRIN) 600 MG tablet

## 2023-01-29 ENCOUNTER — HEALTH MAINTENANCE LETTER (OUTPATIENT)
Age: 58
End: 2023-01-29

## 2023-02-06 ENCOUNTER — OFFICE VISIT (OUTPATIENT)
Dept: FAMILY MEDICINE | Facility: CLINIC | Age: 58
End: 2023-02-06
Payer: COMMERCIAL

## 2023-02-06 VITALS
SYSTOLIC BLOOD PRESSURE: 114 MMHG | HEIGHT: 60 IN | RESPIRATION RATE: 15 BRPM | WEIGHT: 125 LBS | BODY MASS INDEX: 24.54 KG/M2 | DIASTOLIC BLOOD PRESSURE: 72 MMHG | OXYGEN SATURATION: 100 % | TEMPERATURE: 97 F | HEART RATE: 65 BPM

## 2023-02-06 DIAGNOSIS — F33.8 SEASONAL AFFECTIVE DISORDER (H): ICD-10-CM

## 2023-02-06 DIAGNOSIS — Z12.11 COLON CANCER SCREENING: ICD-10-CM

## 2023-02-06 DIAGNOSIS — F33.0 MILD EPISODE OF RECURRENT MAJOR DEPRESSIVE DISORDER (H): ICD-10-CM

## 2023-02-06 DIAGNOSIS — Z12.31 ENCOUNTER FOR SCREENING MAMMOGRAM FOR BREAST CANCER: ICD-10-CM

## 2023-02-06 PROCEDURE — 99214 OFFICE O/P EST MOD 30 MIN: CPT | Performed by: FAMILY MEDICINE

## 2023-02-06 ASSESSMENT — PAIN SCALES - GENERAL: PAINLEVEL: MODERATE PAIN (4)

## 2023-02-06 NOTE — PROGRESS NOTES
Assessment & Plan     Mild episode of recurrent major depressive disorder  Seasonal affective disorder   - Mood improved and wants to continue Zoloft 25 mg daily until April or May 2023. Advised to wean Zoloft 12.5 mg daily X 2 weeks then stop.     Colon cancer screening  - Colonoscopy Screening  Referral; Future    Encounter for screening mammogram for breast cancer  - MA Screening Digital Bilateral; Future      Deqa Vinita Hickman MD  [facility]    Sandra Liriano is a 58 year old, presenting for the following health issues:  Follow Up      History of Present Illness       Reason for visit:  Folow up visit    She eats 2-3 servings of fruits and vegetables daily.She consumes 0 sweetened beverage(s) daily.She exercises with enough effort to increase her heart rate 60 or more minutes per day.  She exercises with enough effort to increase her heart rate 4 days per week.   She is taking medications regularly.       She is feeling better with the medication.   On the sania days, she is feeling much better.  H/o celiac and she tries to eat gluten. When she eats gluten, she feels some aggravation in the lower abdomen. She notes that days when she has gluten in her diet, there is no abdominal pain.       Review of Systems         Objective    There were no vitals taken for this visit.  There is no height or weight on file to calculate BMI.  Physical Exam

## 2023-02-13 ENCOUNTER — TELEPHONE (OUTPATIENT)
Dept: GASTROENTEROLOGY | Facility: CLINIC | Age: 58
End: 2023-02-13
Payer: COMMERCIAL

## 2023-02-13 NOTE — TELEPHONE ENCOUNTER
Screening Questions  BLUE  KIND OF PREP RED  LOCATION [review exclusion criteria] GREEN  SEDATION TYPE        Yes Are you active on mychart?       Mohamed Ordering/Referring Provider?        HP What type of coverage do you have?      N Have you had a positive covid test in the last 14 days?     24.41 1. BMI  [BMI 40+ - review exclusion criteria]    Yes  2. Are you able to give consent for your medical care? [IF NO,RN REVIEW]          N  3. Are you taking any prescription pain medications on a routine schedule   (ex narcotics: oxycodone, roxicodone, oxycontin,  and percocet)? [RN Review]        NA  3a. EXTENDED PREP What kind of prescription?     N 4. Do you have any chemical dependencies such as alcohol, street drugs, or methadone?        **If yes 3- 5 , please schedule with MAC sedation.**          IF YES TO ANY 6 - 10 - HOSPITAL SETTING ONLY.     N 6.   Do you need assistance transferring?     N 7.   Have you had a heart or lung transplant?    N 8.   Are you currently on dialysis?   N 9.   Do you use daily home oxygen?   N 10. Do you take nitroglycerin?   10a. NA If yes, how often?     11. [FEMALES]  NA Are you currently pregnant?    11a. NA If yes, how many weeks? [ Greater than 12 weeks, OR NEEDED]    N 12. Do you have Pulmonary Hypertension? *NEED PAC APPT AT [facility] w/ MAC*     N 13. [review exclusion criteria]  Do you have any implantable devices in your body (pacemaker, defib, LVAD)?    N 14. In the past 6 months, have you had any heart related issues including cardiomyopathy or heart attack?     14a. N If yes, did it require cardiac stenting if so when?     N 15. Have you had a stroke or Transient ischemic attack (TIA - aka  mini stroke ) within 6 months?      N 16. Do you have mod to severe Obstructive Sleep Apnea?  [Hospital only]    N 17. Do you have SEVERE AND UNCONTROLLED asthma? *NEED PAC APPT AT [facility] w/MAC*     N 18. Are you currently taking any blood thinners?     18a. If yes, inform patient to  "\"follow up w/ ordering provider for bridging instructions.\"    N 19. Do you take the medication Phentermine?    19a. If yes, \"Hold for 7 days before procedure.  Please consult your prescribing provider if you have questions about holding this medication.\"     N  20. Do you have chronic kidney disease?      N  21. Do you have a diagnosis of diabetes?     N  22. On a regular basis do you go 3-5 days between bowel movements?      23. Preferred LOCAL Pharmacy for Pre Prescription    [ LIST ONLY ONE PHARMACY]     [facility] - [place], [place] - [address]        - CLOSING REMINDERS -    Informed patient they will need an adult    Cannot take any type of public or medical transportation alone    Conscious Sedation- Needs  for 6 hours after the procedure       MAC/General-Needs  for 24 hours after procedure    Pre-Procedure Covid test to be completed [[facility] PCR Testing Required]    Confirmed Nurse will call to complete assessment       - SCHEDULING DETAILS -  N Hospital Setting Required? If yes, what is the exclusion?: NISHANT Hudson  Surgeon    3/15/23  Date of Procedure  Lower Endoscopy [Colonoscopy]  Type of Procedure Scheduled  [facility]   [facility]-If you answer yes to questions #8, #20, #21Which Colonoscopy Prep was Sent?     CS Sedation Type     No PAC / Pre-op Required                 "

## 2023-02-22 ENCOUNTER — ANCILLARY PROCEDURE (OUTPATIENT)
Dept: MAMMOGRAPHY | Facility: CLINIC | Age: 58
End: 2023-02-22
Attending: FAMILY MEDICINE
Payer: COMMERCIAL

## 2023-02-22 DIAGNOSIS — Z12.31 ENCOUNTER FOR SCREENING MAMMOGRAM FOR BREAST CANCER: ICD-10-CM

## 2023-02-22 PROCEDURE — 77067 SCR MAMMO BI INCL CAD: CPT

## 2023-02-22 PROCEDURE — 77063 BREAST TOMOSYNTHESIS BI: CPT

## 2023-03-01 ENCOUNTER — TELEPHONE (OUTPATIENT)
Dept: GASTROENTEROLOGY | Facility: CLINIC | Age: 58
End: 2023-03-01

## 2023-03-01 DIAGNOSIS — Z12.11 SPECIAL SCREENING FOR MALIGNANT NEOPLASMS, COLON: Primary | ICD-10-CM

## 2023-03-01 RX ORDER — BISACODYL 5 MG/1
TABLET, DELAYED RELEASE ORAL
Qty: 4 TABLET | Refills: 0 | Status: SHIPPED | OUTPATIENT
Start: 2023-03-01 | End: 2024-01-24

## 2023-03-01 NOTE — TELEPHONE ENCOUNTER
Attempted to contact patient regarding upcoming Colonoscopy  procedure on 3.15.23 for pre assessment questions. No answer.     Left message to return call to [phone] #4    Discuss Covid policy and designated  policy.    Pre op exam? N/A    Arrival time: 0830. Procedure time: 0930    [facility]; [address] [place] [address], 5[address] Floor, [place] [address]    Sedation type: Conscious sedation  (PTSD noted in chart, discuss sedation)    Anticoagulants: No    Electronic implanted devices? No    Diabetic? No    Indication for procedure: screening colonoscopy    Bowel prep recommendation: Standard Golytely      Prep instructions sent via [employer]. Bowel prep script sent to [place], [place] - [address] RN  Endoscopy Procedure Pre Assessment RN

## 2023-03-03 ENCOUNTER — TELEPHONE (OUTPATIENT)
Dept: GASTROENTEROLOGY | Facility: CLINIC | Age: 58
End: 2023-03-03
Payer: COMMERCIAL

## 2023-03-03 NOTE — TELEPHONE ENCOUNTER
Caller: Deandra Colón    Reason for Reschedule/Cancellation (please be detailed, any staff messages or encounters to note?): pt requesting later time      Prior to reschedule please review:    Ordering Provider:JENY RODRIGUEZ    Sedation per order:MODERATE    Does patient have any ASC Exclusions, please identify?: NO      Notes on Cancelled Procedure:    Procedure:Lower Endoscopy [Colonoscopy]     Date: 3/15    Location:[facility]; [address] [place] [address], [address] [place] [address]    Surgeon:         Rescheduled: Yes    Procedure: Lower Endoscopy [Colonoscopy]     Date: 3/15    Location:[facility]; [address] [place] [address], [address] [place] [address]    Surgeon:     Sedation Level Scheduled  MODERATE,  Reason for Sedation Level ORDER/SCREENING    Prep/Instructions updated and sent: YES

## 2023-03-08 ENCOUNTER — ANCILLARY PROCEDURE (OUTPATIENT)
Dept: MAMMOGRAPHY | Facility: CLINIC | Age: 58
End: 2023-03-08
Attending: FAMILY MEDICINE
Payer: COMMERCIAL

## 2023-03-08 DIAGNOSIS — R92.8 ABNORMAL MAMMOGRAM OF RIGHT BREAST: ICD-10-CM

## 2023-03-08 PROCEDURE — G0279 TOMOSYNTHESIS, MAMMO: HCPCS | Performed by: STUDENT IN AN ORGANIZED HEALTH CARE EDUCATION/TRAINING PROGRAM

## 2023-03-08 PROCEDURE — 76642 ULTRASOUND BREAST LIMITED: CPT | Mod: RT | Performed by: STUDENT IN AN ORGANIZED HEALTH CARE EDUCATION/TRAINING PROGRAM

## 2023-03-08 PROCEDURE — 77065 DX MAMMO INCL CAD UNI: CPT | Mod: RT | Performed by: STUDENT IN AN ORGANIZED HEALTH CARE EDUCATION/TRAINING PROGRAM

## 2023-03-08 NOTE — TELEPHONE ENCOUNTER
Second attempt for pre-assessment prior to upcoming colonoscopy     No answer.  Left message to return call [phone] #4    Rasheeda Strange RN  Endoscopy Procedure Pre Assessment RN

## 2023-03-10 NOTE — TELEPHONE ENCOUNTER
Pre assessment questions completed for upcoming Colonoscopy  procedure scheduled on 3/15/23    COVID policy reviewed.     Reviewed procedural arrival time 1300 , procedure time 1400  and facility location [facility]; [address] [place] [address], [address] Floor, [place] [address]    Designated  policy reviewed. Instructed to have someone stay 6 hours post procedure.     Anticoagulation/blood thinners? No    Electronic implanted devices? No    Diabetic? No    Reviewed procedure prep instructions.     Prep instructions sent via [employer].      Patient verbalized understanding and had no questions or concerns at this time.    Mandy Membreno RN  Endoscopy Procedure Pre Assessment RN

## 2023-03-15 ENCOUNTER — HOSPITAL ENCOUNTER (OUTPATIENT)
Facility: AMBULATORY SURGERY CENTER | Age: 58
Discharge: HOME | End: 2023-03-15
Attending: INTERNAL MEDICINE | Admitting: INTERNAL MEDICINE
Payer: COMMERCIAL

## 2023-03-15 VITALS
OXYGEN SATURATION: 100 % | SYSTOLIC BLOOD PRESSURE: 112 MMHG | TEMPERATURE: 97.8 F | DIASTOLIC BLOOD PRESSURE: 66 MMHG | RESPIRATION RATE: 12 BRPM | HEART RATE: 66 BPM

## 2023-03-15 LAB — COLONOSCOPY: NORMAL

## 2023-03-15 PROCEDURE — 88305 TISSUE EXAM BY PATHOLOGIST: CPT | Mod: 26 | Performed by: PATHOLOGY

## 2023-03-15 PROCEDURE — 45385 COLONOSCOPY W/LESION REMOVAL: CPT | Mod: 33

## 2023-03-15 PROCEDURE — 88305 TISSUE EXAM BY PATHOLOGIST: CPT | Mod: TC | Performed by: INTERNAL MEDICINE

## 2023-03-15 RX ORDER — ONDANSETRON 4 MG/1
4 TABLET, ORALLY DISINTEGRATING ORAL EVERY 6 HOURS PRN
Status: DISCONTINUED | OUTPATIENT
Start: 2023-03-15 | End: 2023-03-16 | Stop reason: HOSPADM

## 2023-03-15 RX ORDER — PROCHLORPERAZINE MALEATE 10 MG
10 TABLET ORAL EVERY 6 HOURS PRN
Status: DISCONTINUED | OUTPATIENT
Start: 2023-03-15 | End: 2023-03-16 | Stop reason: HOSPADM

## 2023-03-15 RX ORDER — NALOXONE HYDROCHLORIDE 0.4 MG/ML
0.4 INJECTION, SOLUTION INTRAMUSCULAR; INTRAVENOUS; SUBCUTANEOUS
Status: DISCONTINUED | OUTPATIENT
Start: 2023-03-15 | End: 2023-03-16 | Stop reason: HOSPADM

## 2023-03-15 RX ORDER — FENTANYL CITRATE 50 UG/ML
INJECTION, SOLUTION INTRAMUSCULAR; INTRAVENOUS DAILY PRN
Status: DISCONTINUED | OUTPATIENT
Start: 2023-03-15 | End: 2023-03-15 | Stop reason: HOSPADM

## 2023-03-15 RX ORDER — ONDANSETRON 2 MG/ML
4 INJECTION INTRAMUSCULAR; INTRAVENOUS EVERY 6 HOURS PRN
Status: DISCONTINUED | OUTPATIENT
Start: 2023-03-15 | End: 2023-03-16 | Stop reason: HOSPADM

## 2023-03-15 RX ORDER — FLUMAZENIL 0.1 MG/ML
0.2 INJECTION, SOLUTION INTRAVENOUS
Status: DISCONTINUED | OUTPATIENT
Start: 2023-03-15 | End: 2023-03-16 | Stop reason: HOSPADM

## 2023-03-15 RX ORDER — LIDOCAINE 40 MG/G
CREAM TOPICAL
Status: DISCONTINUED | OUTPATIENT
Start: 2023-03-15 | End: 2023-03-15 | Stop reason: HOSPADM

## 2023-03-15 RX ORDER — NALOXONE HYDROCHLORIDE 0.4 MG/ML
0.2 INJECTION, SOLUTION INTRAMUSCULAR; INTRAVENOUS; SUBCUTANEOUS
Status: DISCONTINUED | OUTPATIENT
Start: 2023-03-15 | End: 2023-03-16 | Stop reason: HOSPADM

## 2023-03-15 RX ORDER — ONDANSETRON 2 MG/ML
4 INJECTION INTRAMUSCULAR; INTRAVENOUS
Status: DISCONTINUED | OUTPATIENT
Start: 2023-03-15 | End: 2023-03-15 | Stop reason: HOSPADM

## 2023-03-15 NOTE — H&P
Deandra Colón  [id number]  female  58 year old      Reason for procedure/surgery: abd pain    Patient Active Problem List   Diagnosis     Right shoulder pain     Adhesive capsulitis of right shoulder     Biceps tendinitis of right upper extremity     Glaucoma suspect     Major depression, single episode     Tendinopathy of right rotator cuff     Pinguecula     Posttraumatic stress disorder     Right sided temporal headache     Special screening for malignant neoplasms, colon     Uterine leiomyoma     Osteoarthritis of glenohumeral joint, right     Episode of recurrent major depressive disorder, unspecified depression episode severity (H)       Past Surgical History:    Past Surgical History:   Procedure Laterality Date     MARSUPIALIZATION BARTHOLIN CYST  08/27/2009     PARTIAL HYSTERECTOMY  04/2010       Past Medical History:   Past Medical History:   Diagnosis Date     Hair loss      Uterine fibroid        Social History:   Social History     Tobacco Use     Smoking status: Never     Smokeless tobacco: Never   Substance Use Topics     Alcohol use: Not on file       Family History:   Family History   Problem Relation Age of Onset     Vision Loss Mother      Glaucoma Mother      Kidney Disease Father      Diabetes Father        Allergies: No Known Allergies    Active Medications:   Current Outpatient Medications   Medication Sig Dispense Refill     bisacodyl (DULCOLAX) 5 MG EC tablet Take 2 tablets at 3 pm the day before your procedure. If your procedure is before 11 am, take 2 additional tablets at 11 pm. If your procedure is after 11 am, take 2 additional tablets at 6 am. For additional instructions refer to your colonoscopy prep instructions. 4 tablet 0     ibuprofen (ADVIL,MOTRIN) 600 MG tablet [IBUPROFEN (ADVIL,MOTRIN) 600 MG TABLET] Take 1 tablet (600 mg total) by mouth every 6 (six) hours as needed for pain. 60 tablet 1     multivitamin therapeutic (THERAGRAN) tablet [MULTIVITAMIN THERAPEUTIC (THERAGRAN)  TABLET] Take 1 tablet by mouth daily.       omega-3/dha/epa/fish oil (FISH OIL-OMEGA-3 FATTY ACIDS) 300-1,000 mg capsule Take 2 g by mouth daily       polyethylene glycol (GOLYTELY) 236 g suspension The night before the exam at 6 pm drink an 8-ounce glass every 15 minutes until the jug is half empty. If you arrive before 11 AM: Drink the other half of the Golytely jug at 11 PM night before procedure. If you arrive after 11 AM: Drink the other half of the Golytely jug at 6 AM day of procedure. For additional instructions refer to your colonoscopy prep instructions. 4000 mL 0     sertraline (ZOLOFT) 25 MG tablet TAKE 1 TABLET (25 MG) BY MOUTH DAILY 90 tablet 0       Systemic Review:   CONSTITUTIONAL: NEGATIVE for fever, chills, change in weight  ENT/MOUTH: NEGATIVE for ear, mouth and throat problems  RESP: NEGATIVE for significant cough or SOB  CV: NEGATIVE for chest pain, palpitations or peripheral edema    Physical Examination:   Vital Signs: BP 95/57   Pulse 66   Temp 97.9  F (36.6  C)   Resp 12   SpO2 100%   GENERAL: healthy, alert and no distress  NECK: no adenopathy, no asymmetry, masses, or scars  RESP: lungs clear to auscultation - no rales, rhonchi or wheezes  CV: regular rate and rhythm, normal S1 S2, no S3 or S4, no murmur, click or rub, no peripheral edema and peripheral pulses strong  ABDOMEN: soft, nontender, no hepatosplenomegaly, no masses and bowel sounds normal  MS: no gross musculoskeletal defects noted, no edema    Plan: Appropriate to proceed as scheduled.      Ariadne Hudson MD  3/15/2023    PCP:  Dominic Hickman

## 2023-03-16 LAB
PATH REPORT.COMMENTS IMP SPEC: NORMAL
PATH REPORT.COMMENTS IMP SPEC: NORMAL
PATH REPORT.FINAL DX SPEC: NORMAL
PATH REPORT.GROSS SPEC: NORMAL
PATH REPORT.MICROSCOPIC SPEC OTHER STN: NORMAL
PATH REPORT.RELEVANT HX SPEC: NORMAL
PHOTO IMAGE: NORMAL

## 2023-03-29 ENCOUNTER — VIRTUAL VISIT (OUTPATIENT)
Dept: PSYCHOLOGY | Facility: CLINIC | Age: 58
End: 2023-03-29
Payer: COMMERCIAL

## 2023-03-29 DIAGNOSIS — F41.1 GAD (GENERALIZED ANXIETY DISORDER): Primary | ICD-10-CM

## 2023-03-29 PROCEDURE — 90837 PSYTX W PT 60 MINUTES: CPT | Mod: VID | Performed by: MARRIAGE & FAMILY THERAPIST

## 2023-03-29 NOTE — PROGRESS NOTES
[facility]                                     Progress Note    Patient Name: Deandra Colón  Date: 3/29/2023         Service Type: Individual      Session Start Time: 3:00PM  Session End Time: 3:55PM     Session Length: 55 minutes    Session #: 20    Attendees: Client attended alone    Service Modality:  Video Visit:      Provider verified identity through the following two step process.  Patient provided:  Patient photo    Telemedicine Visit: The patient's condition can be safely assessed and treated via synchronous audio and visual telemedicine encounter.      Reason for Telemedicine Visit: Patient has requested telehealth visit    Originating Site (Patient Location): Patient's home    Distant Site (Provider Location): Provider Remote Setting- Home Office    Consent:  The patient/guardian has verbally consented to: the potential risks and benefits of telemedicine (video visit) versus in person care; bill my insurance or make self-payment for services provided; and responsibility for payment of non-covered services.     Patient would like the video invitation sent by:  My Chart    Mode of Communication:  Video Conference via [employer]    As the provider I attest to compliance with applicable laws and regulations related to telemedicine.    DATA  Extended Session (53+ minutes):   - Longer session due to limited access to mental health appointments and necessity to address patient's distress / complexity  Interactive Complexity: No  Crisis: No      Progress Since Last Session (Related to Symptoms / Goals / Homework):   Symptoms: No change trying to decide next steps    Homework: Partially completed      Episode of Care Goals: Minimal progress - ACTION (Actively working towards change); Intervened by reinforcing change plan / affirming steps taken     Current / Ongoing Stressors and Concerns:  Client is trying to determine whether to move home. She thought she would in April and then pushed it  back to July/August. Talked about the dysfunction with her mother and siblings that are challenging. She does have an option of moving to [place] where one of her other sisters live. She continues to weigh the pros and cons.      Treatment Objective(s) Addressed in This Session:   Identify negative self-talk and behaviors: challenge core beliefs, myths, and actions     Intervention:   CBT: mindfulness and manage self talk  Solution Focused: job search    Assessments completed prior to visit:  The following assessments were completed by patient for this visit: None    PROMIS 10-Global Health (all questions and answers displayed):   PROMIS 10 2/22/2022 5/12/2022 10/5/2022 3/29/2023   In general, would you say your health is: Very good - Very good -   In general, would you say your quality of life is: Very good - Good -   In general, how would you rate your physical health? Very good - Very good -   In general, how would you rate your mental health, including your mood and your ability to think? Very good - Very good -   In general, how would you rate your satisfaction with your social activities and relationships? Good - Fair -   In general, please rate how well you carry out your usual social activities and roles Very good - Very good -   To what extent are you able to carry out your everyday physical activities such as walking, climbing stairs, carrying groceries, or moving a chair? Completely - Completely -   How often have you been bothered by emotional problems such as feeling anxious, depressed or irritable? Sometimes - Sometimes -   How would you rate your fatigue on average? Moderate - Mild -   How would you rate your pain on average?   0 = No Pain  to  10 = Worst Imaginable Pain 8 - 7 -   In general, would you say your health is: 4 3 4 4   In general, would you say your quality of life is: 4 3 3 3   In general, how would you rate your physical health? 4 3 4 4   In general, how would you rate your mental  health, including your mood and your ability to think? 4 3 4 4   In general, how would you rate your satisfaction with your social activities and relationships? 3 2 2 3   In general, please rate how well you carry out your usual social activities and roles. (This includes activities at home, at work and in your community, and responsibilities as a parent, child, spouse, employee, friend, etc.) 4 3 4 4   To what extent are you able to carry out your everyday physical activities such as walking, climbing stairs, carrying groceries, or moving a chair? 5 3 5 5   In the past 7 days, how often have you been bothered by emotional problems such as feeling anxious, depressed, or irritable? 3 2 3 3   In the past 7 days, how would you rate your fatigue on average? 3 2 2 2   In the past 7 days, how would you rate your pain on average, where 0 means no pain, and 10 means worst imaginable pain? 8 5 7 0   Global Mental Health Score 14 12 12 13   Global Physical Health Score 14 13 15 18   PROMIS TOTAL - SUBSCORES 28 25 27 31   Some recent data might be hidden     PROMIS 10-Global Health (only subscores and total score):   PROMIS-10 Scores Only 2/22/2022 5/12/2022 10/5/2022 3/29/2023   Global Mental Health Score 14 12 12 13   Global Physical Health Score 14 13 15 18   PROMIS TOTAL - SUBSCORES 28 25 27 31         ASSESSMENT: Current Emotional / Mental Status (status of significant symptoms):   Risk status (Self / Other harm or suicidal ideation)   Patient denies current fears or concerns for personal safety.   Patient denies current or recent suicidal ideation or behaviors.   Patient denies current or recent homicidal ideation or behaviors.   Patient denies current or recent self injurious behavior or ideation.   Patient denies other safety concerns.   Patient reports there has been no change in risk factors since their last session.     Patient reports there has been no change in protective factors since their last session.      Recommended that patient call 911 or go to the local ED should there be a change in any of these risk factors.     Appearance:   Appropriate    Eye Contact:   Good    Psychomotor Behavior: Normal    Attitude:   Interested Friendly Pleasant   Orientation:   All   Speech    Rate / Production: Normal/ Responsive Talkative    Volume:  Normal    Mood:    Elevated    Affect:    Appropriate    Thought Content:  Clear    Thought Form:  Coherent  Goal Directed  Logical    Insight:    Good      Medication Review:   No changes to current psychiatric medication(s)     Medication Compliance:   Yes     Changes in Health Issues:   None reported     Chemical Use Review:   Substance Use: Chemical use reviewed, no active concerns identified      Tobacco Use: No current tobacco use.      Diagnosis:  1. PILAR (generalized anxiety disorder)        Collateral Reports Completed:   Not Applicable    PLAN: (Patient Tasks / Therapist Tasks / Other)  Homework: Client to weigh her options about moving.        INOCENCIA Sosa                                                         ______________________________________________________________________                                              Individual Treatment Plan    Patient's Name: Deandra Colón  YOB: 1965    Date of Creation: 5/12/2022  Date Treatment Plan Last Reviewed/Revised: 3/29/2023    DSM5 Diagnoses: 300.02 (F41.1) Generalized Anxiety Disorder  Psychosocial / Contextual Factors: Job loss, single, financial  PROMIS (reviewed every 90 days): 3/29/2023 Score: 31    Referral / Collaboration:  Referral to another professional/service is not indicated at this time.    Anticipated number of session for this episode of care: 12  Anticipation frequency of session: Every 2-3 months  Anticipated Duration of each session: 38-52 minutes  Treatment plan will be reviewed in 90 days or when goals have been changed.     MeasurableTreatment Goal(s) related to diagnosis /  functional impairment(s)  Goal 1: Client will lower GAD7 score to 3 or below.    I will know I've met my goal when I'm less anxious.      Objective #A (Client Action)    Client will Increase interest, engagement, and pleasure in doing things.  Status: New - Date(s): 3/29/2023     Intervention(s)  Therapist will assign homework to increase activity level.    Objective #B  Client will work on finding new job.  Status: New - Date(s): 3/29/2023    Intervention(s)  Therapist will encourage client in job search.    Objective #C  Client will Identify negative self-talk and behaviors: challenge core beliefs, myths, and actions.  Status: New - Date(s): 3/29/2023    Intervention(s)  Therapist will teach emotional recognition/identification. and improve self talk.        Patient has reviewed and agreed to the above plan.      Elizabeth Ivy, INOCENCIA  March 29, 2023

## 2023-07-05 ENCOUNTER — VIRTUAL VISIT (OUTPATIENT)
Dept: PSYCHOLOGY | Facility: CLINIC | Age: 58
End: 2023-07-05
Payer: COMMERCIAL

## 2023-07-05 DIAGNOSIS — F41.1 GAD (GENERALIZED ANXIETY DISORDER): Primary | ICD-10-CM

## 2023-07-05 PROCEDURE — 90837 PSYTX W PT 60 MINUTES: CPT | Mod: VID | Performed by: MARRIAGE & FAMILY THERAPIST

## 2023-07-05 NOTE — PROGRESS NOTES
[facility]                                     Progress Note    Patient Name: Deandra Colón  Date: 7/5/2023         Service Type: Individual      Session Start Time: 3:00PM  Session End Time: 3:55PM     Session Length: 55 minutes    Session #: 21    Attendees: Client attended alone    Service Modality:  Video Visit:      Provider verified identity through the following two step process.  Patient provided:  Patient photo    Telemedicine Visit: The patient's condition can be safely assessed and treated via synchronous audio and visual telemedicine encounter.      Reason for Telemedicine Visit: Patient has requested telehealth visit    Originating Site (Patient Location): Patient's home    Distant Site (Provider Location): Provider Remote Setting- Home Office    Consent:  The patient/guardian has verbally consented to: the potential risks and benefits of telemedicine (video visit) versus in person care; bill my insurance or make self-payment for services provided; and responsibility for payment of non-covered services.     Patient would like the video invitation sent by:  My Chart    Mode of Communication:  Video Conference via Amwell    As the provider I attest to compliance with applicable laws and regulations related to telemedicine.    DATA  Extended Session (53+ minutes):   - Longer session due to limited access to mental health appointments and necessity to address patient's distress / complexity  Interactive Complexity: No  Crisis: No      Progress Since Last Session (Related to Symptoms / Goals / Homework):   Symptoms: No change trying to decide next steps    Homework: Partially completed      Episode of Care Goals: Minimal progress - ACTION (Actively working towards change); Intervened by reinforcing change plan / affirming steps taken     Current / Ongoing Stressors and Concerns:  Client is trying to determine whether to move home.Talked about the dysfunction with her mother and  siblings that are challenging. She does have an option of moving to [place] where one of her other sisters live. She continues to weigh the pros and cons.      Treatment Objective(s) Addressed in This Session:   Identify negative self-talk and behaviors: challenge core beliefs, myths, and actions     Intervention:   CBT: mindfulness and manage self talk  Solution Focused: job search    Assessments completed prior to visit:  The following assessments were completed by patient for this visit: None    PROMIS 10-Global Health (all questions and answers displayed):       2/22/2022     2:09 PM 5/12/2022     8:00 AM 10/5/2022     2:59 PM 3/29/2023     5:00 PM 7/6/2023    10:00 AM   PROMIS 10   In general, would you say your health is: Very good  Very good     In general, would you say your quality of life is: Very good  Good     In general, how would you rate your physical health? Very good  Very good     In general, how would you rate your mental health, including your mood and your ability to think? Very good  Very good     In general, how would you rate your satisfaction with your social activities and relationships? Good  Fair     In general, please rate how well you carry out your usual social activities and roles Very good  Very good     To what extent are you able to carry out your everyday physical activities such as walking, climbing stairs, carrying groceries, or moving a chair? Completely  Completely     In the past 7 days, how often have you been bothered by emotional problems such as feeling anxious, depressed, or irritable? Sometimes  Sometimes     In the past 7 days, how would you rate your fatigue on average? Moderate  Mild     In the past 7 days, how would you rate your pain on average, where 0 means no pain, and 10 means worst imaginable pain? 8  7     In general, would you say your health is: 4 3 4 4 4   In general, would you say your quality of life is: 4 3 3 3 4   In general, how would you rate your  physical health? 4 3 4 4 4   In general, how would you rate your mental health, including your mood and your ability to think? 4 3 4 4 3   In general, how would you rate your satisfaction with your social activities and relationships? 3 2 2 3 3   In general, please rate how well you carry out your usual social activities and roles. (This includes activities at home, at work and in your community, and responsibilities as a parent, child, spouse, employee, friend, etc.) 4 3 4 4 3   To what extent are you able to carry out your everyday physical activities such as walking, climbing stairs, carrying groceries, or moving a chair? 5 3 5 5 4   In the past 7 days, how often have you been bothered by emotional problems such as feeling anxious, depressed, or irritable? 3 2 3 3 3   In the past 7 days, how would you rate your fatigue on average? 3 2 2 2 2   In the past 7 days, how would you rate your pain on average, where 0 means no pain, and 10 means worst imaginable pain? 8 5 7 0 3   Global Mental Health Score 14 12 12 13 13   Global Physical Health Score 14 13 15 18 16   PROMIS TOTAL - SUBSCORES 28 25 27 31 29     PROMIS 10-Global Health (only subscores and total score):       2/22/2022     2:09 PM 5/12/2022     8:00 AM 10/5/2022     2:59 PM 3/29/2023     5:00 PM 7/6/2023    10:00 AM   PROMIS-10 Scores Only   Global Mental Health Score 14 12 12 13 13   Global Physical Health Score 14 13 15 18 16   PROMIS TOTAL - SUBSCORES 28 25 27 31 29         ASSESSMENT: Current Emotional / Mental Status (status of significant symptoms):   Risk status (Self / Other harm or suicidal ideation)   Patient denies current fears or concerns for personal safety.   Patient denies current or recent suicidal ideation or behaviors.   Patient denies current or recent homicidal ideation or behaviors.   Patient denies current or recent self injurious behavior or ideation.   Patient denies other safety concerns.   Patient reports there has been no change  in risk factors since their last session.     Patient reports there has been no change in protective factors since their last session.     Recommended that patient call 911 or go to the local ED should there be a change in any of these risk factors.     Appearance:   Appropriate    Eye Contact:   Good    Psychomotor Behavior: Normal    Attitude:   Interested Friendly Pleasant   Orientation:   All   Speech    Rate / Production: Normal/ Responsive Talkative    Volume:  Normal    Mood:    Elevated    Affect:    Appropriate    Thought Content:  Clear    Thought Form:  Coherent  Goal Directed  Logical    Insight:    Good      Medication Review:   No changes to current psychiatric medication(s)     Medication Compliance:   Yes     Changes in Health Issues:   None reported     Chemical Use Review:   Substance Use: Chemical use reviewed, no active concerns identified      Tobacco Use: No current tobacco use.      Diagnosis:  1. PILAR (generalized anxiety disorder)        Collateral Reports Completed:   Not Applicable    PLAN: (Patient Tasks / Therapist Tasks / Other)  Homework: Client to weigh her options about moving.        Elizabeth Ivy LMFT                                                         ______________________________________________________________________                                              Individual Treatment Plan    Patient's Name: Deandra Colón  YOB: 1965    Date of Creation: 5/12/2022  Date Treatment Plan Last Reviewed/Revised: 7/5/2023    DSM5 Diagnoses: 300.02 (F41.1) Generalized Anxiety Disorder  Psychosocial / Contextual Factors: Job loss, single, financial  PROMIS (reviewed every 90 days): 7/5/2023 Score: 29    Referral / Collaboration:  Referral to another professional/service is not indicated at this time.    Anticipated number of session for this episode of care: 12  Anticipation frequency of session: Every 2-3 months  Anticipated Duration of each session: 38-52  minutes  Treatment plan will be reviewed in 90 days or when goals have been changed.     MeasurableTreatment Goal(s) related to diagnosis / functional impairment(s)  Goal 1: Client will lower GAD7 score to 3 or below.    I will know I've met my goal when I'm less anxious.      Objective #A (Client Action)    Client will Increase interest, engagement, and pleasure in doing things.  Status: New - Date(s): 7/5/2023     Intervention(s)  Therapist will assign homework to increase activity level.    Objective #B  Client will work on finding new job.  Status: New - Date(s): 7/5/2023    Intervention(s)  Therapist will encourage client in job search.    Objective #C  Client will Identify negative self-talk and behaviors: challenge core beliefs, myths, and actions.  Status: New - Date(s): 7/5/2023    Intervention(s)  Therapist will teach emotional recognition/identification and improve self talk.        Patient has reviewed and agreed to the above plan.      Elizabeth Ivy, [employer]  July 5, 2023

## 2023-09-19 NOTE — TELEPHONE ENCOUNTER
Per 10/8/2021 visit : Message me with an update on symptoms in 2 to 4 weeks. If symptoms are controlled then continue Zoloft 25 mg daily. If symptoms are not controlled then increase Zoloft to 50 mg daily.   Sign off on new script if correct.    Thank you   0

## 2023-11-27 ENCOUNTER — MYC MEDICAL ADVICE (OUTPATIENT)
Dept: FAMILY MEDICINE | Facility: CLINIC | Age: 58
End: 2023-11-27
Payer: COMMERCIAL

## 2023-11-28 NOTE — TELEPHONE ENCOUNTER
Advised pt to schedule with Dr. Hickman. Also sent request to pt for updated PHQ9 and GAD7.    UNA EngleN, RN  [facility]

## 2023-11-28 NOTE — TELEPHONE ENCOUNTER
Responded to the patient through MyChart.     Manjula Zambrano RN  [facility]

## 2023-11-29 ENCOUNTER — TRANSFERRED RECORDS (OUTPATIENT)
Dept: HEALTH INFORMATION MANAGEMENT | Facility: CLINIC | Age: 58
End: 2023-11-29

## 2024-01-09 ENCOUNTER — VIRTUAL VISIT (OUTPATIENT)
Dept: PSYCHOLOGY | Facility: CLINIC | Age: 59
End: 2024-01-09
Payer: COMMERCIAL

## 2024-01-09 DIAGNOSIS — F41.1 GAD (GENERALIZED ANXIETY DISORDER): Primary | ICD-10-CM

## 2024-01-09 PROCEDURE — 90837 PSYTX W PT 60 MINUTES: CPT | Mod: 95 | Performed by: MARRIAGE & FAMILY THERAPIST

## 2024-01-09 NOTE — PROGRESS NOTES
[facility]                                     Progress Note    Patient Name: Deandra Colón  Date: 1/9/2024         Service Type: Individual      Session Start Time: 3:00PM  Session End Time: 3:55PM     Session Length: 55 minutes    Session #: 22    Attendees: Client attended alone    Service Modality:  Video Visit:      Provider verified identity through the following two step process.  Patient provided:  Patient photo    Telemedicine Visit: The patient's condition can be safely assessed and treated via synchronous audio and visual telemedicine encounter.      Reason for Telemedicine Visit: Patient has requested telehealth visit    Originating Site (Patient Location): Patient's home    Distant Site (Provider Location): Provider Remote Setting- Home Office    Consent:  The patient/guardian has verbally consented to: the potential risks and benefits of telemedicine (video visit) versus in person care; bill my insurance or make self-payment for services provided; and responsibility for payment of non-covered services.     Patient would like the video invitation sent by:  My Chart    Mode of Communication:  Video Conference via Amwell    As the provider I attest to compliance with applicable laws and regulations related to telemedicine.    DATA  Extended Session (53+ minutes):   - Longer session due to limited access to mental health appointments and necessity to address patient's distress / complexity  Interactive Complexity: No  Crisis: No      Progress Since Last Session (Related to Symptoms / Goals / Homework):   Symptoms: No change trying to decide next steps    Homework: Partially completed      Episode of Care Goals: Minimal progress - ACTION (Actively working towards change); Intervened by reinforcing change plan / affirming steps taken     Current / Ongoing Stressors and Concerns:  Client is trying to determine whether to move home.Talked about the dysfunction with her mother and  siblings that are challenging. She does have an option of moving to [place] where one of her other sisters live. She continues to weigh the pros and cons. Shoulder has been mildly problematic. Talked about dating and being .     Treatment Objective(s) Addressed in This Session:   Identify negative self-talk and behaviors: challenge core beliefs, myths, and actions     Intervention:   CBT: mindfulness and manage self talk  Solution Focused: job search    Assessments completed prior to visit:  The following assessments were completed by patient for this visit: None    PROMIS 10-Global Health (all questions and answers displayed):       2/22/2022     2:09 PM 5/12/2022     8:00 AM 10/5/2022     2:59 PM 3/29/2023     5:00 PM 7/6/2023    10:00 AM 1/9/2024     4:00 PM   PROMIS 10   In general, would you say your health is: Very good  Very good      In general, would you say your quality of life is: Very good  Good      In general, how would you rate your physical health? Very good  Very good      In general, how would you rate your mental health, including your mood and your ability to think? Very good  Very good      In general, how would you rate your satisfaction with your social activities and relationships? Good  Fair      In general, please rate how well you carry out your usual social activities and roles Very good  Very good      To what extent are you able to carry out your everyday physical activities such as walking, climbing stairs, carrying groceries, or moving a chair? Completely  Completely      In the past 7 days, how often have you been bothered by emotional problems such as feeling anxious, depressed, or irritable? Sometimes  Sometimes      In the past 7 days, how would you rate your fatigue on average? Moderate  Mild      In the past 7 days, how would you rate your pain on average, where 0 means no pain, and 10 means worst imaginable pain? 8  7      In general, would you say your health is: 4 3 4  4 4 4   In general, would you say your quality of life is: 4 3 3 3 4 3   In general, how would you rate your physical health? 4 3 4 4 4 4   In general, how would you rate your mental health, including your mood and your ability to think? 4 3 4 4 3 4   In general, how would you rate your satisfaction with your social activities and relationships? 3 2 2 3 3 3   In general, please rate how well you carry out your usual social activities and roles. (This includes activities at home, at work and in your community, and responsibilities as a parent, child, spouse, employee, friend, etc.) 4 3 4 4 3 4   To what extent are you able to carry out your everyday physical activities such as walking, climbing stairs, carrying groceries, or moving a chair? 5 3 5 5 4 4   In the past 7 days, how often have you been bothered by emotional problems such as feeling anxious, depressed, or irritable? 3 2 3 3 3 3   In the past 7 days, how would you rate your fatigue on average? 3 2 2 2 2 2   In the past 7 days, how would you rate your pain on average, where 0 means no pain, and 10 means worst imaginable pain? 8 5 7 0 3 1   Global Mental Health Score 14 12 12 13 13 13   Global Physical Health Score 14 13 15 18 16 16   PROMIS TOTAL - SUBSCORES 28 25 27 31 29 29     PROMIS 10-Global Health (only subscores and total score):       2/22/2022     2:09 PM 5/12/2022     8:00 AM 10/5/2022     2:59 PM 3/29/2023     5:00 PM 7/6/2023    10:00 AM 1/9/2024     4:00 PM   PROMIS-10 Scores Only   Global Mental Health Score 14 12 12 13 13 13   Global Physical Health Score 14 13 15 18 16 16   PROMIS TOTAL - SUBSCORES 28 25 27 31 29 29         ASSESSMENT: Current Emotional / Mental Status (status of significant symptoms):   Risk status (Self / Other harm or suicidal ideation)   Patient denies current fears or concerns for personal safety.   Patient denies current or recent suicidal ideation or behaviors.   Patient denies current or recent homicidal ideation or  behaviors.   Patient denies current or recent self injurious behavior or ideation.   Patient denies other safety concerns.   Patient reports there has been no change in risk factors since their last session.     Patient reports there has been no change in protective factors since their last session.     Recommended that patient call 911 or go to the local ED should there be a change in any of these risk factors.     Appearance:   Appropriate    Eye Contact:   Good    Psychomotor Behavior: Normal    Attitude:   Interested Friendly Pleasant   Orientation:   All   Speech    Rate / Production: Normal/ Responsive Talkative    Volume:  Normal    Mood:    Elevated    Affect:    Appropriate    Thought Content:  Clear    Thought Form:  Coherent  Goal Directed  Logical    Insight:    Good      Medication Review:   No changes to current psychiatric medication(s)     Medication Compliance:   Yes     Changes in Health Issues:   None reported     Chemical Use Review:   Substance Use: Chemical use reviewed, no active concerns identified      Tobacco Use: No current tobacco use.      Diagnosis:  1. PILAR (generalized anxiety disorder)        Collateral Reports Completed:   Not Applicable    PLAN: (Patient Tasks / Therapist Tasks / Other)  Homework: Client to weigh her options about moving.      Elizabeth Ivy, [facility]                                     ______________________________________________________________________________________________________________________                                              Individual Treatment Plan    Patient's Name: Deandra Colón  YOB: 1965    Date of Creation: 5/12/2022  Date Treatment Plan Last Reviewed/Revised: 1/9/2024    DSM5 Diagnoses: 300.02 (F41.1) Generalized Anxiety Disorder  Psychosocial / Contextual Factors: Job loss, single, financial  PROMIS (reviewed every 90 days): 1/9/2024 Score: 29    Referral / Collaboration:  Referral to another professional/service is not  indicated at this time.    Anticipated number of session for this episode of care: 12  Anticipation frequency of session: Every 2-3 months  Anticipated Duration of each session: 38-52 minutes  Treatment plan will be reviewed in 90 days or when goals have been changed.     MeasurableTreatment Goal(s) related to diagnosis / functional impairment(s)  Goal 1: Client will lower GAD7 score to 3 or below.    I will know I've met my goal when I'm less anxious.      Objective #A (Client Action)    Client will Increase interest, engagement, and pleasure in doing things.  Status: Continued- Date(s): 1/9/2024    Intervention(s)  Therapist will assign homework to increase activity level.    Objective #B  Client will work on finding new job.  Status: Continued- Date(s): 1/9/2024    Intervention(s)  Therapist will encourage client in job search.    Objective #C  Client will Identify negative self-talk and behaviors: challenge core beliefs, myths, and actions.  Status: Continued - Date(s): 1/9/2024    Intervention(s)  Therapist will teach emotional recognition/identification and improve self talk.        Patient has reviewed and agreed to the above plan.      Elizabeth Ivy, [facility]  January 9, 2024

## 2024-01-24 ENCOUNTER — OFFICE VISIT (OUTPATIENT)
Dept: FAMILY MEDICINE | Facility: CLINIC | Age: 59
End: 2024-01-24
Payer: COMMERCIAL

## 2024-01-24 VITALS
HEART RATE: 73 BPM | BODY MASS INDEX: 23.79 KG/M2 | TEMPERATURE: 97.6 F | RESPIRATION RATE: 16 BRPM | WEIGHT: 126 LBS | HEIGHT: 61 IN | SYSTOLIC BLOOD PRESSURE: 126 MMHG | OXYGEN SATURATION: 99 % | DIASTOLIC BLOOD PRESSURE: 77 MMHG

## 2024-01-24 DIAGNOSIS — Z13.220 LIPID SCREENING: ICD-10-CM

## 2024-01-24 DIAGNOSIS — F33.0 MILD EPISODE OF RECURRENT MAJOR DEPRESSIVE DISORDER (H): ICD-10-CM

## 2024-01-24 DIAGNOSIS — Z12.31 ENCOUNTER FOR SCREENING MAMMOGRAM FOR BREAST CANCER: ICD-10-CM

## 2024-01-24 DIAGNOSIS — Z11.4 SCREENING FOR HIV (HUMAN IMMUNODEFICIENCY VIRUS): ICD-10-CM

## 2024-01-24 DIAGNOSIS — Z00.00 ROUTINE GENERAL MEDICAL EXAMINATION AT A HEALTH CARE FACILITY: ICD-10-CM

## 2024-01-24 DIAGNOSIS — K90.0 CELIAC DISEASE: ICD-10-CM

## 2024-01-24 DIAGNOSIS — Z11.59 NEED FOR HEPATITIS C SCREENING TEST: ICD-10-CM

## 2024-01-24 LAB
ALBUMIN SERPL BCG-MCNC: 4.4 G/DL (ref 3.5–5.2)
ALP SERPL-CCNC: 55 U/L (ref 40–150)
ALT SERPL W P-5'-P-CCNC: 17 U/L (ref 0–50)
ANION GAP SERPL CALCULATED.3IONS-SCNC: 10 MMOL/L (ref 7–15)
AST SERPL W P-5'-P-CCNC: 22 U/L (ref 0–45)
BILIRUB SERPL-MCNC: 0.3 MG/DL
BUN SERPL-MCNC: 16.1 MG/DL (ref 8–23)
CALCIUM SERPL-MCNC: 9.7 MG/DL (ref 8.8–10.2)
CHLORIDE SERPL-SCNC: 105 MMOL/L (ref 98–107)
CHOLEST SERPL-MCNC: 244 MG/DL
CREAT SERPL-MCNC: 1.05 MG/DL (ref 0.51–0.95)
DEPRECATED HCO3 PLAS-SCNC: 27 MMOL/L (ref 22–29)
EGFRCR SERPLBLD CKD-EPI 2021: 60 ML/MIN/1.73M2
ERYTHROCYTE [DISTWIDTH] IN BLOOD BY AUTOMATED COUNT: 13.4 % (ref 10–15)
FASTING STATUS PATIENT QL REPORTED: NO
GLUCOSE SERPL-MCNC: 93 MG/DL (ref 70–99)
HCT VFR BLD AUTO: 36.2 % (ref 35–47)
HDLC SERPL-MCNC: 89 MG/DL
HGB BLD-MCNC: 11.9 G/DL (ref 11.7–15.7)
LDLC SERPL CALC-MCNC: 147 MG/DL
MCH RBC QN AUTO: 26.4 PG (ref 26.5–33)
MCHC RBC AUTO-ENTMCNC: 32.9 G/DL (ref 31.5–36.5)
MCV RBC AUTO: 80 FL (ref 78–100)
NONHDLC SERPL-MCNC: 155 MG/DL
PLATELET # BLD AUTO: 229 10E3/UL (ref 150–450)
POTASSIUM SERPL-SCNC: 4 MMOL/L (ref 3.4–5.3)
PROT SERPL-MCNC: 7.6 G/DL (ref 6.4–8.3)
RBC # BLD AUTO: 4.5 10E6/UL (ref 3.8–5.2)
SODIUM SERPL-SCNC: 142 MMOL/L (ref 135–145)
TRIGL SERPL-MCNC: 41 MG/DL
TSH SERPL DL<=0.005 MIU/L-ACNC: 1.77 UIU/ML (ref 0.3–4.2)
VIT D+METAB SERPL-MCNC: 33 NG/ML (ref 20–50)
WBC # BLD AUTO: 4.4 10E3/UL (ref 4–11)

## 2024-01-24 PROCEDURE — 85027 COMPLETE CBC AUTOMATED: CPT | Performed by: FAMILY MEDICINE

## 2024-01-24 PROCEDURE — 99214 OFFICE O/P EST MOD 30 MIN: CPT | Mod: 25 | Performed by: FAMILY MEDICINE

## 2024-01-24 PROCEDURE — 80061 LIPID PANEL: CPT | Performed by: FAMILY MEDICINE

## 2024-01-24 PROCEDURE — 36415 COLL VENOUS BLD VENIPUNCTURE: CPT | Performed by: FAMILY MEDICINE

## 2024-01-24 PROCEDURE — 86008 ALLG SPEC IGE RECOMB EA: CPT | Mod: 59 | Performed by: FAMILY MEDICINE

## 2024-01-24 PROCEDURE — 80053 COMPREHEN METABOLIC PANEL: CPT | Performed by: FAMILY MEDICINE

## 2024-01-24 PROCEDURE — 86003 ALLG SPEC IGE CRUDE XTRC EA: CPT | Performed by: FAMILY MEDICINE

## 2024-01-24 PROCEDURE — 99396 PREV VISIT EST AGE 40-64: CPT | Performed by: FAMILY MEDICINE

## 2024-01-24 PROCEDURE — 86364 TISS TRNSGLTMNASE EA IG CLAS: CPT | Mod: 59 | Performed by: FAMILY MEDICINE

## 2024-01-24 PROCEDURE — 84443 ASSAY THYROID STIM HORMONE: CPT | Performed by: FAMILY MEDICINE

## 2024-01-24 PROCEDURE — 82306 VITAMIN D 25 HYDROXY: CPT | Performed by: FAMILY MEDICINE

## 2024-01-24 RX ORDER — SERTRALINE HYDROCHLORIDE 25 MG/1
25 TABLET, FILM COATED ORAL DAILY
Qty: 30 TABLET | Refills: 0 | Status: SHIPPED | OUTPATIENT
Start: 2024-01-24 | End: 2024-02-23

## 2024-01-24 ASSESSMENT — ENCOUNTER SYMPTOMS
ABDOMINAL PAIN: 0
HEARTBURN: 0
HEADACHES: 0
NERVOUS/ANXIOUS: 0
JOINT SWELLING: 0
FEVER: 0
PALPITATIONS: 0
PARESTHESIAS: 0
HEMATOCHEZIA: 0
ARTHRALGIAS: 0
EYE PAIN: 0
DIARRHEA: 0
FREQUENCY: 1
WEAKNESS: 0
DYSURIA: 0
MYALGIAS: 0
NAUSEA: 0
HEMATURIA: 0
SHORTNESS OF BREATH: 0
CHILLS: 0
CONSTIPATION: 0
DIZZINESS: 0
BREAST MASS: 0
COUGH: 0
SORE THROAT: 0

## 2024-01-24 NOTE — PROGRESS NOTES
Preventive Care Visit  [facility]  Dominic Hickman MD, Family Medicine  Jan 24, 2024       SUBJECTIVE:   Deandra is a 59 year old, presenting for the following:  Physical        1/24/2024    11:15 AM   Additional Questions   Roomed by Yoli   Accompanied by Self     Healthy Habits:     Getting at least 3 servings of Calcium per day:  Yes    Bi-annual eye exam:  Yes    Dental care twice a year:  Yes    Sleep apnea or symptoms of sleep apnea:  None    Diet:  Gluten-free/reduced    Frequency of exercise:  2-3 days/week    Duration of exercise:  45-60 minutes    Taking medications regularly:  Yes    Medication side effects:  None    Additional concerns today:  Yes    Celiac seems to be getting worse. When she used to ate wheat she would have upper abdominal pain. The other day she ate not gluten free cookie, which she thought was gluten free. She feels that there was gluten due to the abdominal reaction. No heartburn or gastritis symptoms. No constipation or diarrhea. Stools are regular. Up to date with colonoscopy. No lactose intolerance.     Have you ever done Advance Care Planning? (For example, a Health Directive, POLST, or a discussion with a medical provider or your loved ones about your wishes): No, advance care planning information given to patient to review.  Patient declined advance care planning discussion at this time.    Social History     Tobacco Use    Smoking status: Never    Smokeless tobacco: Never   Substance Use Topics    Alcohol use: Not on file             1/24/2024     9:53 AM   Alcohol Use   Prescreen: >3 drinks/day or >7 drinks/week? Not Applicable     Reviewed orders with patient.  Reviewed health maintenance and updated orders accordingly - Yes      Breast Cancer Screening:    FHS-7:       2/22/2023     2:18 PM 1/24/2024     9:56 AM   Breast CA Risk Assessment (FHS-7)   Did any of your first-degree relatives have breast or ovarian cancer? Yes Yes   Did any of  "your relatives have bilateral breast cancer? No Unknown   Did any man in your family have breast cancer? No Unknown   Did any woman in your family have breast and ovarian cancer? No No   Did any woman in your family have breast cancer before age 50 y? No Unknown   Do you have 2 or more relatives with breast and/or ovarian cancer? No Yes   Do you have 2 or more relatives with breast and/or bowel cancer? No Unknown       Pertinent mammograms are reviewed under the imaging tab.    History of abnormal Pap smear: Status post benign hysterectomy. Health Maintenance and Surgical History updated.     Reviewed and updated as needed this visit by clinical staff   Tobacco  Allergies  Meds              Reviewed and updated as needed this visit by Provider                    Review of Systems   Constitutional:  Negative for chills and fever.   HENT:  Negative for congestion, ear pain, hearing loss and sore throat.    Eyes:  Negative for pain and visual disturbance.   Respiratory:  Negative for cough and shortness of breath.    Cardiovascular:  Negative for chest pain and palpitations.   Gastrointestinal:  Negative for abdominal pain, constipation, diarrhea and nausea.   Genitourinary:  Positive for frequency. Negative for dysuria, genital sores, hematuria, pelvic pain, urgency, vaginal bleeding and vaginal discharge.   Musculoskeletal:  Negative for arthralgias, joint swelling and myalgias.   Skin:  Negative for rash.   Neurological:  Negative for dizziness, weakness and headaches.   Psychiatric/Behavioral:  The patient is not nervous/anxious.          OBJECTIVE:   /77 (BP Location: Right arm, Patient Position: Sitting, Cuff Size: Adult Regular)   Pulse 73   Temp 97.6  F (36.4  C) (Temporal)   Resp 16   Ht 1.55 m (5' 1.02\")   Wt 57.2 kg (126 lb)   SpO2 99%   BMI 23.79 kg/m     Estimated body mass index is 23.79 kg/m  as calculated from the following:    Height as of this encounter: 1.55 m (5' 1.02\").    Weight as " of this encounter: 57.2 kg (126 lb).  Physical Exam  GENERAL: alert and no distress  EYES: Eyes grossly normal to inspection  HENT: ear canals and TM's normal  NECK: no adenopathy, no asymmetry, masses, or scars  RESP: lungs clear to auscultation - no rales, rhonchi or wheezes  CV: regular rate and rhythm, normal S1 S2  ABDOMEN: soft, nontender, no hepatosplenomegaly, no masses and bowel sounds normal  MS: no gross musculoskeletal defects noted, no edema  SKIN: no suspicious lesions or rashes  NEURO: Normal strength and tone, mentation intact and speech normal  PSYCH: mentation appears normal, affect normal/bright  LYMPH: no cervical, supraclavicular, axillary    Diagnostic Test Results:  Labs reviewed in Epic    ASSESSMENT/PLAN:     Routine general medical examination at a health care facility  - up to date with colonoscopy   - declined dermatology   - CBC with platelets; Future  - Comprehensive metabolic panel (BMP + Alb, Alk Phos, ALT, AST, Total. Bili, TP); Future  - TSH with free T4 reflex; Future  - Vitamin D Deficiency; Future  - CBC with platelets  - Comprehensive metabolic panel (BMP + Alb, Alk Phos, ALT, AST, Total. Bili, TP)  - TSH with free T4 reflex  - Vitamin D Deficiency    Mild episode of recurrent major depressive disorder (H24)  - discussed restarting medication which she was interested in   - previously used Zoloft  - advised below  - follow up in three months   - sertraline (ZOLOFT) 25 MG tablet; Take 1 tablet (25 mg) by mouth daily for 30 days  Dispense: 30 tablet; Refill: 0  - sertraline (ZOLOFT) 50 MG tablet; Take 1 tablet (50 mg) by mouth daily Start Zoloft 50 mg after completing one month of zoloft 25 mg daily.  Dispense: 90 tablet; Refill: 0  - PRIMARY CARE FOLLOW-UP SCHEDULING; Future    Celiac disease  - intermittent mild symptoms   - not taking probitioics but daily yogurt  - she eats gluten free food  -  offered dietician and declined  - recheck labs,   - Tissue transglutaminase quintin IgA  and IgG; Future  - Milk Components Allergy Panel; Future  - Tissue transglutaminase quintin IgA and IgG  - Milk Components Allergy Panel    Screening for HIV (human immunodeficiency virus)  Need for hepatitis C screening test  - declined     Encounter for screening mammogram for breast cancer  - MA Screen Bilateral w/Kevyn; Future    Lipid screening  - Lipid panel reflex to direct LDL Non-fasting; Future  - Lipid panel reflex to direct LDL Non-fasting       Patient has been advised of split billing requirements and indicates understanding: Yes      Counseling  Reviewed preventive health counseling, as reflected in patient instructions        She reports that she has never smoked. She has never used smokeless tobacco.          Signed Electronically by: Dominic Hickman MD

## 2024-01-24 NOTE — PATIENT INSTRUCTIONS
Restarting Zoloft -   Zoloft 25 mg daily for one month  If you are tolerating medication without side effects then increase dose to Zoloft 50 mg daily   Please follow up in three months       Preventive Health Recommendations  Female Ages 50 - 64    Yearly exam: See your health care provider every year in order to  Review health changes.   Discuss preventive care.    Review your medicines if your doctor has prescribed any.    Get a Pap test every three years (unless you have an abnormal result and your provider advises testing more often).  If you get Pap tests with HPV test, you only need to test every 5 years, unless you have an abnormal result.   You do not need a Pap test if your uterus was removed (hysterectomy) and you have not had cancer.  You should be tested each year for STDs (sexually transmitted diseases) if you're at risk.   Have a mammogram every 1 to 2 years.  Have a colonoscopy at age 45, or have a yearly FIT test (stool test). These exams screen for colon cancer.    Have a cholesterol test every 5 years, or more often if advised.  Have a diabetes test (fasting glucose) every three years. If you are at risk for diabetes, you should have this test more often.   If you are at risk for osteoporosis (brittle bone disease), think about having a bone density scan (DEXA).    Shots: Get a flu shot each year. Get a tetanus shot every 10 years.    Nutrition:   Eat at least 5 servings of fruits and vegetables each day.  Eat whole-grain bread, whole-wheat pasta and brown rice instead of white grains and rice.  Get adequate Calcium and Vitamin D.     Lifestyle  Exercise at least 150 minutes a week (30 minutes a day, 5 days a week). This will help you control your weight and prevent disease.  Limit alcohol to one drink per day.  No smoking.   Wear sunscreen to prevent skin cancer.   See your dentist every six months for an exam and cleaning.  See your eye doctor every 1 to 2 years.

## 2024-01-25 LAB
A-LACTALB IGE QN: <0.1 KU(A)/L
B-LACTOGLOB MF77 IGE QN: <0.1 KU(A)/L
CASEIN IGE QN: <0.1 KU(A)/L
TTG IGA SER-ACNC: 0.4 U/ML
TTG IGG SER-ACNC: 1 U/ML

## 2024-01-26 ENCOUNTER — MYC MEDICAL ADVICE (OUTPATIENT)
Dept: FAMILY MEDICINE | Facility: CLINIC | Age: 59
End: 2024-01-26
Payer: COMMERCIAL

## 2024-01-26 DIAGNOSIS — K90.0 CELIAC DISEASE: Primary | ICD-10-CM

## 2024-01-29 ENCOUNTER — TELEPHONE (OUTPATIENT)
Dept: FAMILY MEDICINE | Facility: CLINIC | Age: 59
End: 2024-01-29
Payer: COMMERCIAL

## 2024-01-29 NOTE — TELEPHONE ENCOUNTER
Reason for Call:  Other prescription    Detailed comments: patient called and needs medication Zoloft from Lisa Aburto at [facility]     Would like to pickup today at [facility]    Please contact patient.  Thank you    Phone Number Patient can be reached at: Home number on file [phone] (home)    Best Time: any    Can we leave a detailed message on this number? YES    Call taken on 1/29/2024 at 11:10 AM by Tequila Ortiz

## 2024-01-29 NOTE — TELEPHONE ENCOUNTER
Patient was called after TE received 1/29/24 to send medications in,same requested meds, to pharmacy and advised meds were sent 1/24/24 so should connect to [facility] drug.      Please review and sign if agreeable-thanks!

## 2024-02-02 ENCOUNTER — TELEPHONE (OUTPATIENT)
Dept: FAMILY MEDICINE | Facility: CLINIC | Age: 59
End: 2024-02-02

## 2024-02-02 NOTE — TELEPHONE ENCOUNTER
Order/Referral Request    Who is requesting: Patient    Orders being requested: Nutrition Referral    Reason service is needed/diagnosis: Celiac Disease    When are orders needed by: asap    Has this been discussed with Provider: Yes    Does patient have a preference on a Group/Provider/Facility? [facility]    Does patient have an appointment scheduled?: No    Where to send orders: Place orders within Epic    Could we send this information to you in [facility] or would you prefer to receive a phone call?:   Patient would prefer a phone call   Okay to leave a detailed message?: Yes at Home number on file [phone] (home)

## 2024-02-07 ENCOUNTER — PATIENT OUTREACH (OUTPATIENT)
Dept: CARE COORDINATION | Facility: CLINIC | Age: 59
End: 2024-02-07
Payer: COMMERCIAL

## 2024-03-01 NOTE — TELEPHONE ENCOUNTER
REFERRAL INFORMATION:  Referring Provider:  Dominic Hickman MD   Referring Clinic:  internal  Reason for Visit/Diagnosis: Celiac disease      FUTURE VISIT INFORMATION:  Appointment Date: 3/7/2024  Appointment Time: 10:45AM     NOTES STATUS DETAILS   OFFICE NOTE from Referring Provider Internal Dominic Hickman MD    OPERATIVE REPORT Internal    MEDICATION LIST Internal         COLONOSCOPY Internal 3/15/2023- internal   4/5/2013- internal    STOOL TESTING Internal    PERTINENT LABS Internal    PATHOLOGY REPORTS (RELATED) Internal ASCENDING COLON, POLYP, BIOPSY:  Tubular adenoma; negative for high grade dysplasia   IMAGING (CT, MRI, EGD, MRCP, Small Bowel Follow Through/SBT, MR/CT Enterography) Internal US Pelvic Complete 1/24/2020   CT abdomen pelvis 4/11/2019

## 2024-03-06 ENCOUNTER — PATIENT OUTREACH (OUTPATIENT)
Dept: CARE COORDINATION | Facility: CLINIC | Age: 59
End: 2024-03-06
Payer: COMMERCIAL

## 2024-03-07 ENCOUNTER — PRE VISIT (OUTPATIENT)
Dept: GASTROENTEROLOGY | Facility: CLINIC | Age: 59
End: 2024-03-07

## 2024-03-07 ENCOUNTER — LAB (OUTPATIENT)
Dept: LAB | Facility: CLINIC | Age: 59
End: 2024-03-07
Payer: COMMERCIAL

## 2024-03-07 ENCOUNTER — OFFICE VISIT (OUTPATIENT)
Dept: GASTROENTEROLOGY | Facility: CLINIC | Age: 59
End: 2024-03-07
Attending: FAMILY MEDICINE
Payer: COMMERCIAL

## 2024-03-07 ENCOUNTER — ANCILLARY PROCEDURE (OUTPATIENT)
Dept: GENERAL RADIOLOGY | Facility: CLINIC | Age: 59
End: 2024-03-07
Attending: PHYSICIAN ASSISTANT
Payer: COMMERCIAL

## 2024-03-07 VITALS
DIASTOLIC BLOOD PRESSURE: 68 MMHG | WEIGHT: 123.9 LBS | HEIGHT: 61 IN | SYSTOLIC BLOOD PRESSURE: 97 MMHG | OXYGEN SATURATION: 98 % | HEART RATE: 75 BPM | BODY MASS INDEX: 23.39 KG/M2

## 2024-03-07 DIAGNOSIS — R14.0 BLOATING: ICD-10-CM

## 2024-03-07 DIAGNOSIS — R10.84 ABDOMINAL PAIN, GENERALIZED: ICD-10-CM

## 2024-03-07 DIAGNOSIS — R10.84 ABDOMINAL PAIN, GENERALIZED: Primary | ICD-10-CM

## 2024-03-07 LAB
ANION GAP SERPL CALCULATED.3IONS-SCNC: 10 MMOL/L (ref 7–15)
BUN SERPL-MCNC: 16.2 MG/DL (ref 8–23)
CALCIUM SERPL-MCNC: 9.7 MG/DL (ref 8.8–10.2)
CHLORIDE SERPL-SCNC: 105 MMOL/L (ref 98–107)
CREAT SERPL-MCNC: 1.1 MG/DL (ref 0.51–0.95)
DEPRECATED HCO3 PLAS-SCNC: 27 MMOL/L (ref 22–29)
EGFRCR SERPLBLD CKD-EPI 2021: 57 ML/MIN/1.73M2
GLUCOSE SERPL-MCNC: 100 MG/DL (ref 70–99)
POTASSIUM SERPL-SCNC: 4.2 MMOL/L (ref 3.4–5.3)
SODIUM SERPL-SCNC: 142 MMOL/L (ref 135–145)

## 2024-03-07 PROCEDURE — 99000 SPECIMEN HANDLING OFFICE-LAB: CPT | Performed by: PATHOLOGY

## 2024-03-07 PROCEDURE — 74018 RADEX ABDOMEN 1 VIEW: CPT | Mod: GC | Performed by: RADIOLOGY

## 2024-03-07 PROCEDURE — 99205 OFFICE O/P NEW HI 60 MIN: CPT | Performed by: PHYSICIAN ASSISTANT

## 2024-03-07 PROCEDURE — 36415 COLL VENOUS BLD VENIPUNCTURE: CPT | Performed by: PATHOLOGY

## 2024-03-07 PROCEDURE — 80048 BASIC METABOLIC PNL TOTAL CA: CPT | Performed by: PATHOLOGY

## 2024-03-07 PROCEDURE — 86364 TISS TRNSGLTMNASE EA IG CLAS: CPT | Performed by: PHYSICIAN ASSISTANT

## 2024-03-07 ASSESSMENT — PAIN SCALES - GENERAL: PAINLEVEL: MILD PAIN (3)

## 2024-03-07 NOTE — LETTER
3/7/2024         RE: Deandra Colón  [address]  [place] [address]        Dear Colleague,    Thank you for referring your patient, Deandra Colón, to the [facility]STROENTER[facility] [place]. Please see a copy of my visit note below.      GI CLINIC VISIT    CC/REFERRING MD:  Dominic Hickman  REASON FOR CONSULTATION: K90.0 (ICD-10-CM) - Celiac disease     Chart Review  1/23/20  DGA IgA 8.4 (equivocal)   DGA IgG <0.4  TTG IgG 0.6  TTG IgA 0.3  Total IgA 120 (WNL)    2/2024  TTG Ab IgA 0.4  TTG Ab IgG 1.0    HPI  Deandra Colón is a 59 year old year old female with PMHx of PTSD, MDD presenting to establish care with the [facility] GI group for eval of possible celiac disease.    Had equivocal celiac serologies in 2020, since then went on low gluten diet which has been very hard for her as she enjoys eating gluten, almond crossiants and muffins.   -low gluten to gluten free diet continues to today, including leading up to celiac Ab testing 2/2024  -no known celiac dz or autoimmune in family    When she eats gluten, she tends to develop LLQ abd pain increased borborygmi (which improves if she eats something). LLQ abd pain has been ongoing since her 30s (today 59). Pain tends to ebb and flow over 2-3 days and these flares in pain will occur every few weeks or so.   At first she thought it was d/t presence of fibroid but it continued despite removal. She has since got total hysterectomy (d/t fibroid recurrence)   Started taking probiotic (maybe some relief) and yogurt daily which she feels helps more   Had a recent CScope with Dr Hudson that was unremarkable outside of 1 TA     Denied changes in stooling with intake of gluten though shares she has always ran constipated. If she does not take any meds, she will go multiple days w/o a BM (2-3d). She has started eating dried prunes (at least 10/d) which has helped with stooling. Will have 1 BM daily, soft. Never bloody or black  stools.    ROS  Denies fevers, chills, weight loss, nausea, emesis, dysphagia, odynophagia,  heartburn, regurgitation, abdominal pain,  black tarrying stools, bloody stools.     Family Hx  Sis - eating disorder in her 20s   Mom - breast Ca at aged 92, 94 this Deanna   No other known family history or GI related malignancy (esophageal, gastric, pancreatic, liver or colon) or family history of IBD/celiac disease.     Social Hx   no use of NSAIDs or Tylenol.    Use of OTC sleep aid since 2016. More regular use for past 4-5y     No ETOH  Never tobacco products.   No recreational drug use.     PROBLEM LIST  Patient Active Problem List    Diagnosis Date Noted    Episode of recurrent major depressive disorder, unspecified depression episode severity (H24) 04/14/2022     Priority: Medium    Osteoarthritis of glenohumeral joint, right 12/10/2021     Priority: Medium    Major depression, single episode 12/10/2019     Priority: Medium     Formatting of this note might be different from the original.  Created by Conversion  Formatting of this note might be different from the original.  Created by Conversion      Posttraumatic stress disorder 12/10/2019     Priority: Medium     Formatting of this note might be different from the original.  Created by Conversion  Formatting of this note might be different from the original.  Created by Conversion      Right shoulder pain 04/19/2019     Priority: Medium    Adhesive capsulitis of right shoulder 02/25/2019     Priority: Medium    Biceps tendinitis of right upper extremity 02/25/2019     Priority: Medium    Right sided temporal headache 12/04/2017     Priority: Medium    Special screening for malignant neoplasms, colon 04/05/2013     Priority: Medium     Formatting of this note might be different from the original.  Colonoscopy 04/05/13, no path,hx abd pain  Formatting of this note might be different from the original.  Colonoscopy 04/05/13, no path,hx abd pain      Glaucoma suspect  02/22/2012     Priority: Medium     Formatting of this note might be different from the original.  Risk: FH, Race  [facility]: 18/17  Pach:  Q1y visit      Pinguecula 02/22/2012     Priority: Medium     Formatting of this note might be different from the original.  Pinguecula both eyes      Tendinopathy of right rotator cuff 08/04/2009     Priority: Medium    Uterine leiomyoma 11/14/2007     Priority: Medium       PERTINENT PAST MEDICAL HISTORY:  Past Medical History:   Diagnosis Date    Hair loss     Uterine fibroid        PREVIOUS SURGERIES:  Past Surgical History:   Procedure Laterality Date    COLONOSCOPY N/A 3/15/2023    Procedure: COLONOSCOPY, WITH POLYPECTOMY AND BIOPSY;  Surgeon: Ariadne Hudson MD;  Location: [facility] OR    MARSUPIALIZATION BARTHOLIN CYST  08/27/2009    PARTIAL HYSTERECTOMY  04/2010       ALLERGIES:   No Known Allergies    PERTINENT MEDICATIONS:    Current Outpatient Medications:     ibuprofen (ADVIL,MOTRIN) 600 MG tablet, [IBUPROFEN (ADVIL,MOTRIN) 600 MG TABLET] Take 1 tablet (600 mg total) by mouth every 6 (six) hours as needed for pain., Disp: 60 tablet, Rfl: 1    multivitamin therapeutic (THERAGRAN) tablet, [MULTIVITAMIN THERAPEUTIC (THERAGRAN) TABLET] Take 1 tablet by mouth daily., Disp: , Rfl:     omega-3/dha/epa/fish oil (FISH OIL-OMEGA-3 FATTY ACIDS) 300-1,000 mg capsule, Take 2 g by mouth daily, Disp: , Rfl:     sertraline (ZOLOFT) 25 MG tablet, Take 1 tablet (25 mg) by mouth daily for 30 days, Disp: 30 tablet, Rfl: 0    sertraline (ZOLOFT) 50 MG tablet, Take 1 tablet (50 mg) by mouth daily Start Zoloft 50 mg after completing one month of zoloft 25 mg daily., Disp: 90 tablet, Rfl: 0    SOCIAL HISTORY:  Social History     Socioeconomic History    Marital status: Single     Spouse name: Not on file    Number of children: Not on file    Years of education: Not on file    Highest education level: Not on file   Occupational History    Not on file   Tobacco Use    Smoking status: Never     Smokeless tobacco: Never   Vaping Use    Vaping Use: Never used   Substance and Sexual Activity    Alcohol use: Not on file    Drug use: Not on file    Sexual activity: Not on file   Other Topics Concern    Not on file   Social History Narrative    Not on file     Social Determinants of Health     Financial Resource Strain: Low Risk  (1/24/2024)    Financial Resource Strain     Within the past 12 months, have you or your family members you live with been unable to get utilities (heat, electricity) when it was really needed?: No   Food Insecurity: Low Risk  (1/24/2024)    Food Insecurity     Within the past 12 months, did you worry that your food would run out before you got money to buy more?: No     Within the past 12 months, did the food you bought just not last and you didn t have money to get more?: No   Transportation Needs: Low Risk  (1/24/2024)    Transportation Needs     Within the past 12 months, has lack of transportation kept you from medical appointments, getting your medicines, non-medical meetings or appointments, work, or from getting things that you need?: No   Physical Activity: Not on file   Stress: Not on file   Social Connections: Not on file   Interpersonal Safety: Low Risk  (1/24/2024)    Interpersonal Safety     Do you feel physically and emotionally safe where you currently live?: Yes     Within the past 12 months, have you been hit, slapped, kicked or otherwise physically hurt by someone?: No     Within the past 12 months, have you been humiliated or emotionally abused in other ways by your partner or ex-partner?: No   Housing Stability: Low Risk  (1/24/2024)    Housing Stability     Do you have housing? : Yes     Are you worried about losing your housing?: No       FAMILY HISTORY:  Family History   Problem Relation Age of Onset    Vision Loss Mother     Glaucoma Mother     Kidney Disease Father     Diabetes Father        Past/family/social history reviewed and no changes    PHYSICAL  EXAMINATION:  Vitals reviewed: There were no vitals taken for this visit.  Wt:   Wt Readings from Last 2 Encounters:   01/24/24 57.2 kg (126 lb)   02/06/23 56.7 kg (125 lb)      Constitutional: aaox3, cooperative, pleasant, not dyspneic/diaphoretic, no acute distress  Eyes: Sclera anicteric/injected  Ears/nose/mouth/throat: hearing intact  Neck: supple, active ROM w/o limitation or pain   CV: No edema  Respiratory: Unlabored breathing  Abd: Nondistended, +bs, no hepatosplenomegaly, mild tenderness to LLQ, RLQ, no peritoneal signs, neg Grajeda's sign.   Skin: warm, perfused, no jaundice  Psych: Normal affect  MSK: Normal gait     PERTINENT STUDIES:    Office Visit on 01/24/2024   Component Date Value Ref Range Status    Cholesterol 01/24/2024 244 (H)  <200 mg/dL Final    Triglycerides 01/24/2024 41  <150 mg/dL Final    Direct Measure HDL 01/24/2024 89  >=50 mg/dL Final    LDL Cholesterol Calculated 01/24/2024 147 (H)  <=100 mg/dL Final    Non HDL Cholesterol 01/24/2024 155 (H)  <130 mg/dL Final    Patient Fasting > 8hrs? 01/24/2024 No   Final    Tissue Transglutaminase Antibody I* 01/24/2024 0.4  <7.0 U/mL Final    Tissue Transglutaminase Antibody I* 01/24/2024 1.0  <7.0 U/mL Final    Alpha-Lactalbumin IgE 01/24/2024 <0.10  <0.10 KU(A)/L Final    B-Lactoglobulin IgE 01/24/2024 <0.10  <0.10 KU(A)/L Final    Casein IgE 01/24/2024 <0.10  <0.10 KU(A)/L Final    WBC Count 01/24/2024 4.4  4.0 - 11.0 10e3/uL Final    RBC Count 01/24/2024 4.50  3.80 - 5.20 10e6/uL Final    Hemoglobin 01/24/2024 11.9  11.7 - 15.7 g/dL Final    Hematocrit 01/24/2024 36.2  35.0 - 47.0 % Final    MCV 01/24/2024 80  78 - 100 fL Final    MCH 01/24/2024 26.4 (L)  26.5 - 33.0 pg Final    MCHC 01/24/2024 32.9  31.5 - 36.5 g/dL Final    RDW 01/24/2024 13.4  10.0 - 15.0 % Final    Platelet Count 01/24/2024 229  150 - 450 10e3/uL Final    Sodium 01/24/2024 142  135 - 145 mmol/L Final    Potassium 01/24/2024 4.0  3.4 - 5.3 mmol/L Final    Carbon Dioxide  (CO2) 01/24/2024 27  22 - 29 mmol/L Final    Anion Gap 01/24/2024 10  7 - 15 mmol/L Final    Urea Nitrogen 01/24/2024 16.1  8.0 - 23.0 mg/dL Final    Creatinine 01/24/2024 1.05 (H)  0.51 - 0.95 mg/dL Final    GFR Estimate 01/24/2024 60 (L)  >60 mL/min/1.73m2 Final    Calcium 01/24/2024 9.7  8.8 - 10.2 mg/dL Final    Chloride 01/24/2024 105  98 - 107 mmol/L Final    Glucose 01/24/2024 93  70 - 99 mg/dL Final    Alkaline Phosphatase 01/24/2024 55  40 - 150 U/L Final    AST 01/24/2024 22  0 - 45 U/L Final    ALT 01/24/2024 17  0 - 50 U/L Final    Protein Total 01/24/2024 7.6  6.4 - 8.3 g/dL Final    Albumin 01/24/2024 4.4  3.5 - 5.2 g/dL Final    Bilirubin Total 01/24/2024 0.3  <=1.2 mg/dL Final    TSH 01/24/2024 1.77  0.30 - 4.20 uIU/mL Final    Vitamin D, Total (25-Hydroxy) 01/24/2024 33  20 - 50 ng/mL Final        Lab Results   Component Value Date    WBC 4.4 01/24/2024    WBC 6.7 04/11/2019    HGB 11.9 01/24/2024    HGB 12.1 04/11/2019     01/24/2024     04/11/2019    CHOL 244 (H) 01/24/2024    CHOL 193 11/25/2009    TRIG 41 01/24/2024    TRIG 55 11/25/2009    HDL 89 01/24/2024    HDL 59 11/25/2009    ALT 17 01/24/2024    AST 22 01/24/2024     01/24/2024     04/11/2019    BUN 16.1 01/24/2024    BUN 12 04/11/2019    CO2 27 01/24/2024    CO2 27 04/11/2019    TSH 1.77 01/24/2024        Liver Function Studies -   Recent Labs   Lab Test 01/24/24  1235   PROTTOTAL 7.6   ALBUMIN 4.4   BILITOTAL 0.3   ALKPHOS 55   AST 22   ALT 17      PREVIOUS ENDOSCOPY    Results for orders placed or performed during the hospital encounter of 03/15/23   COLONOSCOPY   Result Value Ref Range    COLONOSCOPY       [facility]  [address] [place] [address] [place] [address] [phone]     Endoscopy Department  _______________________________________________________________________________  Patient Name: Deandra Colón           Procedure Date: 3/15/2023 1:33 PM  MRN: [id number]                       Account  Number: [id number]  YOB: 1965              Admit Type: Outpatient  Age: 58                               Room: [facility] PROCEDURE ROOM 02  Gender: Female                        Note Status: Finalized  Attending MD: JARROD ROSADO MD     Pause for the Cause: Completed  Total Sedation Time: 29 minutes         _______________________________________________________________________________     Procedure:             Colonoscopy  Indications:           Generalized abdominal pain  Providers:             JARROD ROSADO MD, Aurora Canales RN  Patient Profile:       57 yo F with gluten sensitivity and intermittent abd                          pain  Referring MD:          JENY RODRIGUEZ   Medicines:             Fentanyl 100 micrograms IV, Midazolam 4 mg IV  Complications:         No immediate complications.  _______________________________________________________________________________  Procedure:             Pre-Anesthesia Assessment:                         - See H&P note in EHR                         After obtaining informed consent, the colonoscope was                          passed under direct vision. Throughout the procedure,                          the patient's blood pressure, pulse, and oxygen                          saturations were monitored continuously. The                          Colonoscope was introduced through the anus and                          advanced to the cecum, identified by appendiceal                          orifice and ileocecal valve. The colonoscopy was                          performed with ease. The patient tolerated the                          procedure fairly well. The quality of the bowel                          preparat ion was excellent.                                                                                   Findings:       The perianal and digital rectal examinations were normal.       A 4 mm polyp was found in the ascending colon. The polyp was sessile.         The polyp was removed with a cold snare. Resection and retrieval were        complete.       The exam was otherwise without abnormality.                                                                                   Impression:            - One 4 mm polyp in the ascending colon, removed with                          a cold snare. Resected and retrieved.                         - The examination was otherwise normal.  Recommendation:        - If the pathology report reveals adenomatous tissue,                          then repeat the colonoscopy for surveillance in 7                          years.                         - F/U with Primary                                                                                     Electro nically Signed by Dr SRINIVASA Hudson  __________________  JARROD HUDSON MD  3/15/2023 2:25:32 PM  I was physically present for the entire viewing portion of the exam.  __________________________  Signature of teaching physician  JARROD HUDSON MD  Number of Addenda: 0    Note Initiated On: 3/15/2023 1:33 PM  Scope In:  Scope Out:     Final Diagnosis   ASCENDING COLON, POLYP, BIOPSY:  Tubular adenoma; negative for high grade dysplasia        ASSESSMENT/PLAN:  Deandra Colón is a 59 year old year old female with PMHx of PTSD, MDD presenting to establish care with the [facility] GI group for eval of possible celiac disease.    #Equivocal DGA Ab, 2020   #LLQ abd pain, bloat    We had an extensive conversation about her prior testing - I explained that her celiac Ab testing in 2020 is equivocal and is not yet diagnostic of celiac disease. She was under impression she had celiac disease. I explained gluten intolerance vs sensitivity. In order to objectively evaluate for celiac disease, I recommended gluten challenge with subsequent serology testing (TTG Ab) and EGD and duodenal biopsy. She is agreeable. Explained steps of gluten challenge and timing of serology and EGD.   -she has had a Cscope  that outside of 1 TA was otherwise unremarkable which is reassuring  -nutritional consult for overview of dietary intake and consideration of low fodmap diet with retrial   -KUB w/ eval of stool burden and gas pattern as explained that I suspect retained stools are driving a lot of her sx. Sheis not able to say if abd pain improved after procedure as she did not pay attention to it at the time    -order TTG Ab and EGD with biowel biopsy after gluten challenge  --I advised at gluten intake for at least 14d for serology testing and around 6 weeks, but ideally 10 weeks for EGD evaluation   -I asked she start daily mag (declined miralax as it made her nauseated) up to 600 mg. Check BMP given slight bump in Crt on recent labs. Normal e-lytes  -I advised that otc night meds tend to have constipating s/e. She can trial hold of night sleep aide for 2 weeks with monitoring stooling pattern  -discussed dietary and lifestyle recs for stooling. Cont prunes     DDX - under-treated constipation, gluten sensitivity, celiac disease, DGBI    Future consideration  1. Pelvic floor eval   2. Trial of IB Abhay     No orders of the defined types were placed in this encounter.    Colorectal cancer screening: Recall 3/2030 d/t TA w/o high grade dysplasia (2/23 scope)     RTC- after EGD     Thank you for this consultation.  It was a pleasure to participate in the care of this patient; please contact me with any further questions.     Follow up: As planned above. Today, I personally spent 60 minutes in direct face to face time with the patient, of which greater than 50% of the time was spent in patient education and counseling as described above. Approximately  minutes were spent on indirect care associated with the patient's consultation including but not limited to review of: patient medical records to date, clinic visits, hospital records, lab results, imaging studies, procedural documentation, and coordinating care with other providers.  The findings from this review are summarized in the above note. All of the above accounted for a cumulative time of 60 minutes and was performed on the date of service.     Again, thank you for allowing me to participate in the care of your patient.      Sincerely,    Michelle Lara PA-C

## 2024-03-07 NOTE — PATIENT INSTRUCTIONS
It was a pleasure taking care of you today.  I've included a brief summary of our discussion and care plan from today's visit below.  Please review this information with your primary care provider.  _______________________________________________________________________    My recommendations are summarized as follows:    Let's have you proceed with a gluten challenge. Eat at least 1 croissant a day for 14 days then leave the blood work sample for me. You can schedule this today.   We should ultimately proceed with upper endoscopy with biopsy of the small bowel to confirm if this is celiac or not - I need you to eat gluten consistently for at least 6 weeks before this procedure. Try to schedule for end of May.   If it is not celiac, we will discuss next steps - this could be gluten sensitivity as well   I like the idea of you meeting with our dietitian to review dietary intake and consideration of a low-fodmap diet with reintroduction. I will put in the referral   Continue with daily prunes to help with bowel evacuation. Aim for about 25-30 grams of fiber in a day, most of this coming from your diet.   -for the next week, use the breakfast/lunch/dinner to get an idea of your baseline dietary fiber intake   OK to supplement with things like adams seeds/flax seeds - start low at 1 tsp daily x 2 weeks, increase by a tsp weekly for a goal of 1-2 tbsp in a day     Please call our clinic or send a MyChart message to us if you have any questions or concerns. MyChart messages are answered by your nurse or doctor typically within 24 hours.  Please allow extra time on weekends and holidays    Return to GI Clinic in (after upper endoscopy) months to review your progress.    _______________________________________________________________________    How do I schedule labs, imaging studies, or procedures that were ordered in clinic today?      Labs: To schedule lab appointment at the [facility], use my chart or call  [phone]. If you have a [place] lab closer to home where you are regularly seen you can give them a call.      Procedures: If a colonoscopy, upper endoscopy, breath test, esophageal manometry, or pH impedence was ordered today, our endoscopy team will call you to schedule this. If you have not heard from our endoscopy team within a week, please call [phone] option 2 to schedule.      Imaging Studies: If you were scheduled for a CT scan, X-ray, MRI, ultrasound, HIDA scan or other imaging study, please call [phone] to have this scheduled.      Referral: If a referral to another specialty was ordered, expect a phone call or follow instructions above. If you have not heard from anyone regarding your referral in a week, please call our clinic to check the status.      Who do I call with any questions after my visit?  Please be in touch if there are any further questions that arise following today's visit.  There are multiple ways to contact your gastroenterology care team.       During business hours, you may reach a Gastroenterology nurse at [phone]     To schedule or reschedule an appointment, please call [phone].      You can always send a secure message through [employer].  [employer] messages are answered by your nurse or doctor typically within 24 hours.  Please allow extra time on weekends and holidays.       For urgent/emergent questions after business hours, you may reach the on-call GI Fellow by contacting the [facility]  at [phone].     How will I get the results of any tests ordered?    You will receive all of your results.  If you have signed up for [employer], any tests ordered at your visit will be available to you after your physician reviews them.  Typically this takes 1-2 weeks.  If there are urgent results that require a change in your care plan, your physician or nurse will call you to discuss the next steps.       What is [employer]?  [employer] is a secure way for  "you to access all of your healthcare records from the [facility].  It is a web based computer program, so you can sign on to it from any location.  It also allows you to send secure messages to your care team.  I recommend signing up for [employer] access if you have not already done so and are comfortable with using a computer.       How to I schedule a follow-up visit?  If you did not schedule a follow-up visit today, please call [phone] to schedule a follow-up office visit.      Sincerely,    Michelle Lara PA-C  Gastroenterology    ---  Toileting techniques  -don t ignore the urge to defecate (try not to hold it in). Normal to have a bowel movement after waking in morning, after eating, or whenever \"nature calls\"  -limit time pushing to less than 10 min on the toilet. If you are not able to have a bowel movement, stop, and try again when you have the urge    Positioning  1. Elevate knees above hips (use a squatty potty)?  2. Lean forward, elbows rested on knees   3. Bulge out abdomen, and straighten your spine    Correct position  -knees higher than hips  -lean forward and put elbows on knees  ?-bulge our your abdomen  -straighten your spine    Position is similar to Dilia s  [employer]     "

## 2024-03-07 NOTE — NURSING NOTE
"Chief Complaint   Patient presents with    New Patient       Vitals:    03/07/24 1046   BP: 97/68   Pulse: 75   SpO2: 98%   Weight: 56.2 kg (123 lb 14.4 oz)   Height: 1.549 m (5' 1\")       Body mass index is 23.41 kg/m .    [employer]    "

## 2024-03-07 NOTE — PROGRESS NOTES
GI CLINIC VISIT    CC/REFERRING MD:  Dominic Hickman  REASON FOR CONSULTATION: K90.0 (ICD-10-CM) - Celiac disease     Chart Review  1/23/20  DGA IgA 8.4 (equivocal)   DGA IgG <0.4  TTG IgG 0.6  TTG IgA 0.3  Total IgA 120 (WNL)    2/2024  TTG Ab IgA 0.4  TTG Ab IgG 1.0    HPI  Deandra Colón is a 59 year old year old female with PMHx of PTSD, MDD presenting to establish care with the [facility] GI group for eval of possible celiac disease.    Had equivocal celiac serologies in 2020, since then went on low gluten diet which has been very hard for her as she enjoys eating gluten, almond crossiants and muffins.   -low gluten to gluten free diet continues to today, including leading up to celiac Ab testing 2/2024  -no known celiac dz or autoimmune in family    When she eats gluten, she tends to develop LLQ abd pain increased borborygmi (which improves if she eats something). LLQ abd pain has been ongoing since her 30s (today 59). Pain tends to ebb and flow over 2-3 days and these flares in pain will occur every few weeks or so.   At first she thought it was d/t presence of fibroid but it continued despite removal. She has since got total hysterectomy (d/t fibroid recurrence)   Started taking probiotic (maybe some relief) and yogurt daily which she feels helps more   Had a recent CScope with Dr Hudson that was unremarkable outside of 1 TA     Denied changes in stooling with intake of gluten though shares she has always ran constipated. If she does not take any meds, she will go multiple days w/o a BM (2-3d). She has started eating dried prunes (at least 10/d) which has helped with stooling. Will have 1 BM daily, soft. Never bloody or black stools.    ROS  Denies fevers, chills, weight loss, nausea, emesis, dysphagia, odynophagia,  heartburn, regurgitation, abdominal pain,  black tarrying stools, bloody stools.     Family Hx  Sis - eating disorder in her 20s   Mom - breast Ca at aged 92, 94 this June   No other known family  history or GI related malignancy (esophageal, gastric, pancreatic, liver or colon) or family history of IBD/celiac disease.     Social Hx   no use of NSAIDs or Tylenol.    Use of OTC sleep aid since 2016. More regular use for past 4-5y     No ETOH  Never tobacco products.   No recreational drug use.     PROBLEM LIST  Patient Active Problem List    Diagnosis Date Noted     Episode of recurrent major depressive disorder, unspecified depression episode severity (H24) 04/14/2022     Priority: Medium     Osteoarthritis of glenohumeral joint, right 12/10/2021     Priority: Medium     Major depression, single episode 12/10/2019     Priority: Medium     Formatting of this note might be different from the original.  Created by Conversion  Formatting of this note might be different from the original.  Created by Conversion       Posttraumatic stress disorder 12/10/2019     Priority: Medium     Formatting of this note might be different from the original.  Created by Conversion  Formatting of this note might be different from the original.  Created by Conversion       Right shoulder pain 04/19/2019     Priority: Medium     Adhesive capsulitis of right shoulder 02/25/2019     Priority: Medium     Biceps tendinitis of right upper extremity 02/25/2019     Priority: Medium     Right sided temporal headache 12/04/2017     Priority: Medium     Special screening for malignant neoplasms, colon 04/05/2013     Priority: Medium     Formatting of this note might be different from the original.  Colonoscopy 04/05/13, no path,hx abd pain  Formatting of this note might be different from the original.  Colonoscopy 04/05/13, no path,hx abd pain       Glaucoma suspect 02/22/2012     Priority: Medium     Formatting of this note might be different from the original.  Risk: FH, Race  Peak IOP: 18/17  Pach:  Q1y visit       Pinguecula 02/22/2012     Priority: Medium     Formatting of this note might be different from the original.  Pinguecula both  eyes       Tendinopathy of right rotator cuff 08/04/2009     Priority: Medium     Uterine leiomyoma 11/14/2007     Priority: Medium       PERTINENT PAST MEDICAL HISTORY:  Past Medical History:   Diagnosis Date     Hair loss      Uterine fibroid        PREVIOUS SURGERIES:  Past Surgical History:   Procedure Laterality Date     COLONOSCOPY N/A 3/15/2023    Procedure: COLONOSCOPY, WITH POLYPECTOMY AND BIOPSY;  Surgeon: Ariadne Hudson MD;  Location: [facility] OR     MARSUPIALIZATION BARTHOLIN CYST  08/27/2009     PARTIAL HYSTERECTOMY  04/2010       ALLERGIES:   No Known Allergies    PERTINENT MEDICATIONS:    Current Outpatient Medications:      ibuprofen (ADVIL,MOTRIN) 600 MG tablet, [IBUPROFEN (ADVIL,MOTRIN) 600 MG TABLET] Take 1 tablet (600 mg total) by mouth every 6 (six) hours as needed for pain., Disp: 60 tablet, Rfl: 1     multivitamin therapeutic (THERAGRAN) tablet, [MULTIVITAMIN THERAPEUTIC (THERAGRAN) TABLET] Take 1 tablet by mouth daily., Disp: , Rfl:      omega-3/dha/epa/fish oil (FISH OIL-OMEGA-3 FATTY ACIDS) 300-1,000 mg capsule, Take 2 g by mouth daily, Disp: , Rfl:      sertraline (ZOLOFT) 25 MG tablet, Take 1 tablet (25 mg) by mouth daily for 30 days, Disp: 30 tablet, Rfl: 0     sertraline (ZOLOFT) 50 MG tablet, Take 1 tablet (50 mg) by mouth daily Start Zoloft 50 mg after completing one month of zoloft 25 mg daily., Disp: 90 tablet, Rfl: 0    SOCIAL HISTORY:  Social History     Socioeconomic History     Marital status: Single     Spouse name: Not on file     Number of children: Not on file     Years of education: Not on file     Highest education level: Not on file   Occupational History     Not on file   Tobacco Use     Smoking status: Never     Smokeless tobacco: Never   Vaping Use     Vaping Use: Never used   Substance and Sexual Activity     Alcohol use: Not on file     Drug use: Not on file     Sexual activity: Not on file   Other Topics Concern     Not on file   Social History Narrative     Not on  file     Social Determinants of Health     Financial Resource Strain: Low Risk  (1/24/2024)    Financial Resource Strain      Within the past 12 months, have you or your family members you live with been unable to get utilities (heat, electricity) when it was really needed?: No   Food Insecurity: Low Risk  (1/24/2024)    Food Insecurity      Within the past 12 months, did you worry that your food would run out before you got money to buy more?: No      Within the past 12 months, did the food you bought just not last and you didn t have money to get more?: No   Transportation Needs: Low Risk  (1/24/2024)    Transportation Needs      Within the past 12 months, has lack of transportation kept you from medical appointments, getting your medicines, non-medical meetings or appointments, work, or from getting things that you need?: No   Physical Activity: Not on file   Stress: Not on file   Social Connections: Not on file   Interpersonal Safety: Low Risk  (1/24/2024)    Interpersonal Safety      Do you feel physically and emotionally safe where you currently live?: Yes      Within the past 12 months, have you been hit, slapped, kicked or otherwise physically hurt by someone?: No      Within the past 12 months, have you been humiliated or emotionally abused in other ways by your partner or ex-partner?: No   Housing Stability: Low Risk  (1/24/2024)    Housing Stability      Do you have housing? : Yes      Are you worried about losing your housing?: No       FAMILY HISTORY:  Family History   Problem Relation Age of Onset     Vision Loss Mother      Glaucoma Mother      Kidney Disease Father      Diabetes Father        Past/family/social history reviewed and no changes    PHYSICAL EXAMINATION:  Vitals reviewed: There were no vitals taken for this visit.  Wt:   Wt Readings from Last 2 Encounters:   01/24/24 57.2 kg (126 lb)   02/06/23 56.7 kg (125 lb)      Constitutional: aaox3, cooperative, pleasant, not dyspneic/diaphoretic,  no acute distress  Eyes: Sclera anicteric/injected  Ears/nose/mouth/throat: hearing intact  Neck: supple, active ROM w/o limitation or pain   CV: No edema  Respiratory: Unlabored breathing  Abd: Nondistended, +bs, no hepatosplenomegaly, mild tenderness to LLQ, RLQ, no peritoneal signs, neg Grajeda's sign.   Skin: warm, perfused, no jaundice  Psych: Normal affect  MSK: Normal gait     PERTINENT STUDIES:    Office Visit on 01/24/2024   Component Date Value Ref Range Status     Cholesterol 01/24/2024 244 (H)  <200 mg/dL Final     Triglycerides 01/24/2024 41  <150 mg/dL Final     Direct Measure HDL 01/24/2024 89  >=50 mg/dL Final     LDL Cholesterol Calculated 01/24/2024 147 (H)  <=100 mg/dL Final     Non HDL Cholesterol 01/24/2024 155 (H)  <130 mg/dL Final     Patient Fasting > 8hrs? 01/24/2024 No   Final     Tissue Transglutaminase Antibody I* 01/24/2024 0.4  <7.0 U/mL Final     Tissue Transglutaminase Antibody I* 01/24/2024 1.0  <7.0 U/mL Final     Alpha-Lactalbumin IgE 01/24/2024 <0.10  <0.10 KU(A)/L Final     B-Lactoglobulin IgE 01/24/2024 <0.10  <0.10 KU(A)/L Final     Casein IgE 01/24/2024 <0.10  <0.10 KU(A)/L Final     WBC Count 01/24/2024 4.4  4.0 - 11.0 10e3/uL Final     RBC Count 01/24/2024 4.50  3.80 - 5.20 10e6/uL Final     Hemoglobin 01/24/2024 11.9  11.7 - 15.7 g/dL Final     Hematocrit 01/24/2024 36.2  35.0 - 47.0 % Final     MCV 01/24/2024 80  78 - 100 fL Final     MCH 01/24/2024 26.4 (L)  26.5 - 33.0 pg Final     MCHC 01/24/2024 32.9  31.5 - 36.5 g/dL Final     RDW 01/24/2024 13.4  10.0 - 15.0 % Final     Platelet Count 01/24/2024 229  150 - 450 10e3/uL Final     Sodium 01/24/2024 142  135 - 145 mmol/L Final     Potassium 01/24/2024 4.0  3.4 - 5.3 mmol/L Final     Carbon Dioxide (CO2) 01/24/2024 27  22 - 29 mmol/L Final     Anion Gap 01/24/2024 10  7 - 15 mmol/L Final     Urea Nitrogen 01/24/2024 16.1  8.0 - 23.0 mg/dL Final     Creatinine 01/24/2024 1.05 (H)  0.51 - 0.95 mg/dL Final     GFR Estimate  01/24/2024 60 (L)  >60 mL/min/1.73m2 Final     Calcium 01/24/2024 9.7  8.8 - 10.2 mg/dL Final     Chloride 01/24/2024 105  98 - 107 mmol/L Final     Glucose 01/24/2024 93  70 - 99 mg/dL Final     Alkaline Phosphatase 01/24/2024 55  40 - 150 U/L Final     AST 01/24/2024 22  0 - 45 U/L Final     ALT 01/24/2024 17  0 - 50 U/L Final     Protein Total 01/24/2024 7.6  6.4 - 8.3 g/dL Final     Albumin 01/24/2024 4.4  3.5 - 5.2 g/dL Final     Bilirubin Total 01/24/2024 0.3  <=1.2 mg/dL Final     TSH 01/24/2024 1.77  0.30 - 4.20 uIU/mL Final     Vitamin D, Total (25-Hydroxy) 01/24/2024 33  20 - 50 ng/mL Final        Lab Results   Component Value Date    WBC 4.4 01/24/2024    WBC 6.7 04/11/2019    HGB 11.9 01/24/2024    HGB 12.1 04/11/2019     01/24/2024     04/11/2019    CHOL 244 (H) 01/24/2024    CHOL 193 11/25/2009    TRIG 41 01/24/2024    TRIG 55 11/25/2009    HDL 89 01/24/2024    HDL 59 11/25/2009    ALT 17 01/24/2024    AST 22 01/24/2024     01/24/2024     04/11/2019    BUN 16.1 01/24/2024    BUN 12 04/11/2019    CO2 27 01/24/2024    CO2 27 04/11/2019    TSH 1.77 01/24/2024        Liver Function Studies -   Recent Labs   Lab Test 01/24/24  1235   PROTTOTAL 7.6   ALBUMIN 4.4   BILITOTAL 0.3   ALKPHOS 55   AST 22   ALT 17      PREVIOUS ENDOSCOPY    Results for orders placed or performed during the hospital encounter of 03/15/23   COLONOSCOPY   Result Value Ref Range    COLONOSCOPY       [facility]  [address] [place], [place] [address]., [place] [address] [phone]     Endoscopy Department  _______________________________________________________________________________  Patient Name: Deandra Colón           Procedure Date: 3/15/2023 1:33 PM  MRN: [id number]                       Account Number: [id number]  YOB: 1965              Admit Type: Outpatient  Age: 58                               Room: [facility] PROCEDURE ROOM 02  Gender: Female                        Note Status:  Finalized  Attending MD: JARROD ROSADO MD     Pause for the Cause: Completed  Total Sedation Time: 29 minutes         _______________________________________________________________________________     Procedure:             Colonoscopy  Indications:           Generalized abdominal pain  Providers:             JARROD ROSADO MD, Aurora Canales RN  Patient Profile:       57 yo F with gluten sensitivity and intermittent abd                          pain  Referring MD:          JENY RODRIGUEZ   Medicines:             Fentanyl 100 micrograms IV, Midazolam 4 mg IV  Complications:         No immediate complications.  _______________________________________________________________________________  Procedure:             Pre-Anesthesia Assessment:                         - See H&P note in EHR                         After obtaining informed consent, the colonoscope was                          passed under direct vision. Throughout the procedure,                          the patient's blood pressure, pulse, and oxygen                          saturations were monitored continuously. The                          Colonoscope was introduced through the anus and                          advanced to the cecum, identified by appendiceal                          orifice and ileocecal valve. The colonoscopy was                          performed with ease. The patient tolerated the                          procedure fairly well. The quality of the bowel                          preparat ion was excellent.                                                                                   Findings:       The perianal and digital rectal examinations were normal.       A 4 mm polyp was found in the ascending colon. The polyp was sessile.        The polyp was removed with a cold snare. Resection and retrieval were        complete.       The exam was otherwise without abnormality.                                                                                    Impression:            - One 4 mm polyp in the ascending colon, removed with                          a cold snare. Resected and retrieved.                         - The examination was otherwise normal.  Recommendation:        - If the pathology report reveals adenomatous tissue,                          then repeat the colonoscopy for surveillance in 7                          years.                         - F/U with Primary                                                                                     Electro nically Signed by Dr SRINIVASA Hudson  __________________  JARROD HUDSON MD  3/15/2023 2:25:32 PM  I was physically present for the entire viewing portion of the exam.  __________________________  Signature of teaching physician  JARROD HUDSON MD  Number of Addenda: 0    Note Initiated On: 3/15/2023 1:33 PM  Scope In:  Scope Out:       Final Diagnosis   ASCENDING COLON, POLYP, BIOPSY:  Tubular adenoma; negative for high grade dysplasia          ASSESSMENT/PLAN:  Deandra Colón is a 59 year old year old female with PMHx of PTSD, MDD presenting to establish care with the [facility] group for eval of possible celiac disease.    #Equivocal DGA Ab, 2020   #LLQ abd pain, bloat    We had an extensive conversation about her prior testing - I explained that her celiac Ab testing in 2020 is equivocal and is not yet diagnostic of celiac disease. She was under impression she had celiac disease. I explained gluten intolerance vs sensitivity. In order to objectively evaluate for celiac disease, I recommended gluten challenge with subsequent serology testing (TTG Ab) and EGD and duodenal biopsy. She is agreeable. Explained steps of gluten challenge and timing of serology and EGD.   -she has had a Cscope that outside of 1 TA was otherwise unremarkable which is reassuring  -nutritional consult for overview of dietary intake and consideration of low fodmap diet with retrial   -KUB w/ eval of stool  burden and gas pattern as explained that I suspect retained stools are driving a lot of her sx. Sheis not able to say if abd pain improved after procedure as she did not pay attention to it at the time    -order TTG Ab and EGD with biowel biopsy after gluten challenge  --I advised at gluten intake for at least 14d for serology testing and around 6 weeks, but ideally 10 weeks for EGD evaluation   -I asked she start daily mag (declined miralax as it made her nauseated) up to 600 mg. Check BMP given slight bump in Crt on recent labs. Normal e-lytes  -I advised that otc night meds tend to have constipating s/e. She can trial hold of night sleep aide for 2 weeks with monitoring stooling pattern  -discussed dietary and lifestyle recs for stooling. Cont prunes     DDX - under-treated constipation, gluten sensitivity, celiac disease, DGBI    Future consideration  1. Pelvic floor eval   2. Trial of IB Abhay     No orders of the defined types were placed in this encounter.      Colorectal cancer screening: Recall 3/2030 d/t TA w/o high grade dysplasia (2/23 scope)     RTC- after EGD     Thank you for this consultation.  It was a pleasure to participate in the care of this patient; please contact me with any further questions.     Michelle Lara PA-C    Follow up: As planned above. Today, I personally spent 60 minutes in direct face to face time with the patient, of which greater than 50% of the time was spent in patient education and counseling as described above. Approximately  minutes were spent on indirect care associated with the patient's consultation including but not limited to review of: patient medical records to date, clinic visits, hospital records, lab results, imaging studies, procedural documentation, and coordinating care with other providers. The findings from this review are summarized in the above note. All of the above accounted for a cumulative time of 60 minutes and was performed on the date of service.

## 2024-03-08 ENCOUNTER — TELEPHONE (OUTPATIENT)
Dept: GASTROENTEROLOGY | Facility: CLINIC | Age: 59
End: 2024-03-08
Payer: COMMERCIAL

## 2024-03-08 LAB
TTG IGA SER-ACNC: 0.4 U/ML
TTG IGG SER-ACNC: <0.6 U/ML

## 2024-03-08 NOTE — TELEPHONE ENCOUNTER
"Endoscopy Scheduling Screen    Have you had a positive Covid test in the last 14 days?  No    Are you active on MyChart?   Yes    What insurance is in the chart?  Other:      Ordering/Referring Provider:     MIRI MANZANO      (If ordering provider performs procedure, schedule with ordering provider unless otherwise instructed. )    BMI: Estimated body mass index is 23.41 kg/m  as calculated from the following:    Height as of 3/7/24: 1.549 m (5' 1\").    Weight as of 3/7/24: 56.2 kg (123 lb 14.4 oz).     Sedation Ordered  MAC/deep sedation.   BMI<= 45 45 < BMI <= 48 48 < BMI < = 50  BMI > 50   No Restrictions No [facility]  No [facility]  [facility] with exceptions Hospital Only OR Only       Are you taking any prescription medications for pain 3 or more times per week?   NO - No RN review required.    Do you have a history of malignant hyperthermia or adverse reaction to anesthesia?  No    (Females) Are you currently pregnant?        Have you been diagnosed or told you have pulmonary hypertension?   No    Do you have an LVAD?  No    Have you been told you have moderate to severe sleep apnea?  No    Have you been told you have COPD, asthma, or any other lung disease?  No    Do you have any heart conditions?  No     Have you ever had an organ transplant?   No    Have you ever had or are you awaiting a heart or lung transplant?   No    Have you had a stroke or transient ischemic attack (TIA aka \"mini stroke\" in the last 6 months?   No    Have you been diagnosed with or been told you have cirrhosis of the liver?   No    Are you currently on dialysis?   No    Do you need assistance transferring?   No    BMI: Estimated body mass index is 23.41 kg/m  as calculated from the following:    Height as of 3/7/24: 1.549 m (5' 1\").    Weight as of 3/7/24: 56.2 kg (123 lb 14.4 oz).     Is patients BMI > 40 and scheduling location [facility]?  No    Do you take an injectable medication for weight loss or diabetes (excluding insulin)?  No    Do " you take the medication Naltrexone?  No    Do you take blood thinners?  No       Prep   Are you currently on dialysis or do you have chronic kidney disease?  No    Do you have a diagnosis of diabetes?  No    Do you have a diagnosis of cystic fibrosis (CF)?  No    On a regular basis do you go 3 -5 days between bowel movements?      BMI > 40?      Preferred Pharmacy:    [facility] [place], [place] - [address]  [address]  [place] [address]  Phone: [phone] Fax: [phone]      Final Scheduling Details   Colonoscopy prep sent?      Procedure scheduled  Upper endoscopy (EGD)    Surgeon:  IAN     Date of procedure: ON WAITLIST  7/2     Pre-OP / PAC:   No - Not required for this site.    Location  [facility] - Per order.    Sedation   MAC/Deep Sedation - Per order.      Patient Reminders:   You will receive a call from a Nurse to review instructions and health history.  This assessment must be completed prior to your procedure.  Failure to complete the Nurse assessment may result in the procedure being cancelled.      On the day of your procedure, please designate an adult(s) who can drive you home stay with you for the next 24 hours. The medicines used in the exam will make you sleepy. You will not be able to drive.      You cannot take public transportation, ride share services, or non-medical taxi service without a responsible caregiver.  Medical transport services are allowed with the requirement that a responsible caregiver will receive you at your destination.  We require that drivers and caregivers are confirmed prior to your procedure.

## 2024-03-08 NOTE — TELEPHONE ENCOUNTER
Medical screening examination initiated.  I have conducted a focused provider triage encounter, findings are as follows:    Brief history of present illness:  multiple covid tests negative, then positive    Vitals:    03/07/24 2014   BP: 120/69   BP Location: Right arm   Patient Position: Sitting   Pulse: 106   Resp: 19   Temp: 99.2 °F (37.3 °C)   TempSrc: Oral   SpO2: 97%   Weight: 102.1 kg (225 lb)   Height: 5' (1.524 m)       Pertinent physical exam:  nontoxic    Brief workup plan:  covid test    Preliminary workup initiated; this workup will be continued and followed by the physician or advanced practice provider that is assigned to the patient when roomed.   Pended referral

## 2024-03-19 DIAGNOSIS — R14.0 BLOATING: ICD-10-CM

## 2024-03-19 DIAGNOSIS — R10.84 ABDOMINAL PAIN, GENERALIZED: Primary | ICD-10-CM

## 2024-03-21 ENCOUNTER — VIRTUAL VISIT (OUTPATIENT)
Dept: FAMILY MEDICINE | Facility: CLINIC | Age: 59
End: 2024-03-21
Payer: COMMERCIAL

## 2024-03-21 ENCOUNTER — LAB (OUTPATIENT)
Dept: LAB | Facility: CLINIC | Age: 59
End: 2024-03-21
Payer: COMMERCIAL

## 2024-03-21 ENCOUNTER — ANCILLARY PROCEDURE (OUTPATIENT)
Dept: MAMMOGRAPHY | Facility: CLINIC | Age: 59
End: 2024-03-21
Attending: FAMILY MEDICINE
Payer: COMMERCIAL

## 2024-03-21 DIAGNOSIS — R10.84 ABDOMINAL PAIN, GENERALIZED: ICD-10-CM

## 2024-03-21 DIAGNOSIS — R14.0 BLOATING: ICD-10-CM

## 2024-03-21 DIAGNOSIS — Z79.1 NSAID LONG-TERM USE: ICD-10-CM

## 2024-03-21 DIAGNOSIS — Z12.31 ENCOUNTER FOR SCREENING MAMMOGRAM FOR BREAST CANCER: ICD-10-CM

## 2024-03-21 DIAGNOSIS — N28.9 DECREASED RENAL FUNCTION: Primary | ICD-10-CM

## 2024-03-21 LAB
ANION GAP SERPL CALCULATED.3IONS-SCNC: 12 MMOL/L (ref 7–15)
BUN SERPL-MCNC: 16.3 MG/DL (ref 8–23)
CALCIUM SERPL-MCNC: 10.1 MG/DL (ref 8.8–10.2)
CHLORIDE SERPL-SCNC: 105 MMOL/L (ref 98–107)
CREAT SERPL-MCNC: 1.13 MG/DL (ref 0.51–0.95)
DEPRECATED HCO3 PLAS-SCNC: 25 MMOL/L (ref 22–29)
EGFRCR SERPLBLD CKD-EPI 2021: 55 ML/MIN/1.73M2
GLUCOSE SERPL-MCNC: 79 MG/DL (ref 70–99)
POTASSIUM SERPL-SCNC: 4.2 MMOL/L (ref 3.4–5.3)
SODIUM SERPL-SCNC: 142 MMOL/L (ref 135–145)
TTG IGA SER-ACNC: 0.4 U/ML
TTG IGG SER-ACNC: 1.2 U/ML

## 2024-03-21 PROCEDURE — 77063 BREAST TOMOSYNTHESIS BI: CPT | Mod: GC | Performed by: RADIOLOGY

## 2024-03-21 PROCEDURE — 36415 COLL VENOUS BLD VENIPUNCTURE: CPT | Performed by: PATHOLOGY

## 2024-03-21 PROCEDURE — 99000 SPECIMEN HANDLING OFFICE-LAB: CPT | Performed by: PATHOLOGY

## 2024-03-21 PROCEDURE — 99214 OFFICE O/P EST MOD 30 MIN: CPT | Mod: 93 | Performed by: FAMILY MEDICINE

## 2024-03-21 PROCEDURE — 77067 SCR MAMMO BI INCL CAD: CPT | Mod: GC | Performed by: RADIOLOGY

## 2024-03-21 PROCEDURE — 80048 BASIC METABOLIC PNL TOTAL CA: CPT | Performed by: PATHOLOGY

## 2024-03-21 PROCEDURE — 86364 TISS TRNSGLTMNASE EA IG CLAS: CPT | Mod: 91 | Performed by: PHYSICIAN ASSISTANT

## 2024-03-21 NOTE — PROGRESS NOTES
Deandra is a 59 year old who is being evaluated via a billable telephone visit.    What phone number would you like to be contacted at? [phone]   How would you like to obtain your AVS? MyChart  Originating Location (pt. Location): Home    Distant Location (provider location):  On-site    Assessment & Plan     Decreased renal function  NSAID long-term use  - Discussed labs from 4 years ago and today's labs. We discussed that decreased kidney function likely due to chronic NSAID use. Advised to avoid any further NSAID use and to stay hydrated. Discussed rechecking labs in one month which would determine if decreased kidney function is chronic. I discussed that likely it is due to chronic kidney disease and she would need routine monitoring every 6 months to 1 year.   - Basic metabolic panel  (Ca, Cl, CO2, Creat, Gluc, K, Na, BUN); Future  - Albumin Random Urine Quantitative with Creat Ratio; Future  - PRIMARY CARE FOLLOW-UP SCHEDULING; Future          Subjective   Deandra is a 59 year old, presenting for the following health issues:  RECHECK      3/21/2024     5:26 PM   Additional Questions   Roomed by TONY RN     HPI     She drinks a lot of water.  She does walk a lot.   She takes ibuprofen 200 mg (4-8 tablets)/day. She takes for 2-3 days and has been doing that for the last 5 years.   She also does take a sleep aid (benadryl).             Objective           Vitals:  No vitals were obtained today due to virtual visit.    Physical Exam   General: Alert and no distress //Respiratory: No audible wheeze, cough, or shortness of breath // Psychiatric:  Appropriate affect, tone, and pace of words            Phone call duration: 18 minutes  Signed Electronically by: Dominic Hickman MD

## 2024-03-26 ENCOUNTER — VIRTUAL VISIT (OUTPATIENT)
Dept: GASTROENTEROLOGY | Facility: CLINIC | Age: 59
End: 2024-03-26
Attending: PHYSICIAN ASSISTANT
Payer: COMMERCIAL

## 2024-03-26 VITALS — WEIGHT: 123 LBS | BODY MASS INDEX: 23.24 KG/M2

## 2024-03-26 DIAGNOSIS — R10.84 ABDOMINAL PAIN, GENERALIZED: ICD-10-CM

## 2024-03-26 DIAGNOSIS — R14.0 BLOATING: ICD-10-CM

## 2024-03-26 PROCEDURE — 99207 PR NO CHARGE LOS: CPT | Mod: 95 | Performed by: DIETITIAN, REGISTERED

## 2024-03-26 PROCEDURE — 97802 MEDICAL NUTRITION INDIV IN: CPT | Mod: 95 | Performed by: DIETITIAN, REGISTERED

## 2024-03-26 ASSESSMENT — PAIN SCALES - GENERAL: PAINLEVEL: NO PAIN (0)

## 2024-03-26 NOTE — NURSING NOTE
Is the patient currently in the state of [place]? YES    Visit mode:VIDEO    If the visit is dropped, the patient can be reconnected by: VIDEO VISIT: Text to cell phone:   Telephone Information:   Mobile [phone]       Will anyone else be joining the visit? No  (If patient encounters technical issues they should call [phone])    How would you like to obtain your AVS? MyChart    Are changes needed to the allergy or medication list? Yes also taking the below medication  Magnesium citrate 200 mg two cap once daily    Rooming Documentation: Patient declined to complete qnrs with VF.    Reason for visit: Consult     AMANDA Najera

## 2024-03-26 NOTE — PROGRESS NOTES
Virtual Visit Details    Type of service:  Video Visit     Originating Location (pt. Location): Home  Distant Location (provider location):  Off-site  Platform used for Video Visit: [facility] Outpatient Medical Nutrition Therapy      Time Spent:  45 minutes  Session Type:  Initial   Referring Physician:  Michelle Lara PA-C  Reason for RD Visit:   abdominal pain and bloating    Nutrition Assessment:  Patient is a 59 year old female with history that includes abdominal pain, bloating. She stated that she was just diagnosed with decreased renal function which is new dx for her. Her PCP noted may be related to NSAIDs.     She stated that with eating gluten she will have LLQ abdominal pain, bloating. She has issues with constipation, but since she has been eating 10 prunes everyday in the morning, she is now having BMs and denies any current issues with constipation. If she eats any less than 10 prunes or skips eating them, then doesn't have a BM. She stated that she has had a couple labs for celiac and one lab was abnormal but her recent lab wasn't. She has an EGD scheduled for early July so NATHALY told her to do a gluten challenge for at least 6 week prior to that EGD. She had some questions about this. She has been eating some gluten before her recent labs but did not eat daily for the 14 days before the labs. She is trying to incorporate some now. There are some foods she likes such as croissants but she doesn't want to eat these daily and she doesn't want to gain weight with adding in more starches into her diet especially as she doesn't typically eat a lot of starches and breads, even GF option regularly. With eating more gluten now for labs and future testing, she has been getting a migraine and stomach pain and bloating. For the last year, she had been eating mostly gluten free but not strictly GF. She stated that following a strict GF diet is difficult to do. She had some questions about celiac  "disease vs gluten intolerance. She hasn't started taking metamucil yet as discussed with her NATHALY because with eating the 10 prunes per day, she wonders if she needs to take the fiber as well. Encouraged her to discussed with NATHALY. (Writer will message NATHALY as well).     Patient Active Problem List   Diagnosis    Right shoulder pain    Adhesive capsulitis of right shoulder    Biceps tendinitis of right upper extremity    Glaucoma suspect    Major depression, single episode    Tendinopathy of right rotator cuff    Pinguecula    Posttraumatic stress disorder    Right sided temporal headache    Special screening for malignant neoplasms, colon    Uterine leiomyoma    Osteoarthritis of glenohumeral joint, right    Episode of recurrent major depressive disorder, unspecified depression episode severity (H24)     Height:   Ht Readings from Last 1 Encounters:   03/07/24 1.549 m (5' 1\")     Weight:  Wt Readings from Last 10 Encounters:   03/26/24 55.8 kg (123 lb)   03/07/24 56.2 kg (123 lb 14.4 oz)   01/24/24 57.2 kg (126 lb)   02/06/23 56.7 kg (125 lb)   04/14/22 56.2 kg (124 lb)   11/11/21 56.2 kg (124 lb)   10/14/21 56.2 kg (124 lb)   10/08/21 56.2 kg (124 lb)   08/13/21 56.7 kg (125 lb)   01/23/20 65 kg (143 lb 4 oz)     BMI: Estimated body mass index is 23.24 kg/m  as calculated from the following:    Height as of 3/7/24: 1.549 m (5' 1\").    Weight as of this encounter: 55.8 kg (123 lb).    Diet Recall:  (some usual/recent meals/snacks/beverages):  Meal Food    Breakfast 10 prunes, yogurt, peanuts or prune, yogurt and banana or belgium waffle with ban, strawberries and whipped cream   Lunch 11:30-12 PM: chicken, rice, sweet potato or 1/2 dinner roll with tuna salad   Dinner Protein ( roasted chicken/salmon) vegetables (cucumber, avocado).    Snacks Yogurt, peauts and if afternoon snack: milk juju, blueberries and in evening: melon or cherries or popcorn. In afternoon butter cookies, croissant   Beverages Coffee with " steamed milk in AM, 1-2 hot tea, ~40 oz water, sometimes small amt coke with popcorn, occas OJ   Alcohol Intake none     Frequency of eating/taking out meals: 2-3x/week has chipotle, may get a croissant take out     Labs:    Last Comprehensive Metabolic Panel:  Sodium   Date Value Ref Range Status   03/21/2024 142 135 - 145 mmol/L Final     Comment:     Reference intervals for this test were updated on 09/26/2023 to more accurately reflect our healthy population. There may be differences in the flagging of prior results with similar values performed with this method. Interpretation of those prior results can be made in the context of the updated reference intervals.      Potassium   Date Value Ref Range Status   03/21/2024 4.2 3.4 - 5.3 mmol/L Final   04/11/2019 3.9 3.5 - 5.0 mmol/L Final     Chloride   Date Value Ref Range Status   03/21/2024 105 98 - 107 mmol/L Final   04/11/2019 108 (H) 98 - 107 mmol/L Final     Carbon Dioxide (CO2)   Date Value Ref Range Status   03/21/2024 25 22 - 29 mmol/L Final   04/11/2019 27 22 - 31 mmol/L Final     Anion Gap   Date Value Ref Range Status   03/21/2024 12 7 - 15 mmol/L Final   04/11/2019 9 5 - 18 mmol/L Final     Glucose   Date Value Ref Range Status   03/21/2024 79 70 - 99 mg/dL Final   04/11/2019 103 70 - 125 mg/dL Final     Urea Nitrogen   Date Value Ref Range Status   03/21/2024 16.3 8.0 - 23.0 mg/dL Final   04/11/2019 12 8 - 22 mg/dL Final     Creatinine   Date Value Ref Range Status   03/21/2024 1.13 (H) 0.51 - 0.95 mg/dL Final     GFR Estimate   Date Value Ref Range Status   03/21/2024 55 (L) >60 mL/min/1.73m2 Final   04/11/2019 >60 >60 mL/min/1.73m2 Final     Calcium   Date Value Ref Range Status   03/21/2024 10.1 8.8 - 10.2 mg/dL Final     CBC RESULTS:   Recent Labs   Lab Test 01/24/24  1235   WBC 4.4   RBC 4.50   HGB 11.9   HCT 36.2   MCV 80   MCH 26.4*   MCHC 32.9   RDW 13.4        Pertinent Medications/vitamin and mineral supplements:      Current  Outpatient Medications   Medication    sertraline (ZOLOFT) 50 MG tablet    ibuprofen (ADVIL,MOTRIN) 600 MG tablet    multivitamin therapeutic (THERAGRAN) tablet    omega-3/dha/epa/fish oil (FISH OIL-OMEGA-3 FATTY ACIDS) 300-1,000 mg capsule    sertraline (ZOLOFT) 25 MG tablet     No current facility-administered medications for this visit.     Food Allergies:  NKFA     MALNUTRITION:  % Weight Loss:  None noted  % Intake:  No decreased intake noted  Subcutaneous Fat Loss:  None observed  Muscle Loss:  None observed  Fluid Retention:  None noted    Malnutrition Diagnosis: Patient does not meet two of the above criteria necessary for diagnosing malnutrition    Nutrition Prescription: Nutrition Education     Nutrition Intervention      Provided diet education for abdominal pain and bloating, constipation, possible celiac dz and/or gluten intolerance. Answered her question regarding the difference between celiac or gluten sensitivity. Discussed diet tips for increasing fiber, higher fiber diet with some specific tips based on her preferences and usual meals. discussed ideas for gluten challenge, including options that are not necessarily bread such as soy sauce, some granola containing gluten in her morning yogurt, whole grain crackers made with wheat, cous cous instead of rice at her lunch meal or small serving whole grain pasta instead of other starchy at a meal.     Answered patient's questions. Patient verbalized understanding of education provided. See Goals below.     Goals:    Eat a higher fiber diet, recommendation is to gradually increase the fiber you eat to consume a total of about 25 grams to 30 grams per day. Fiber is found in fruits, vegetables, nuts, seeds and whole grain starches.    --When increasing fiber in diet, do so gradually and do not eat too much fiber all of a sudden and also try not to load up on a lot of fiber all at one meal.    --add some adams seeds into your yogurt along with the peanuts  and prunes at breakfast.    --Here are some other general tips to increase fiber:   - choose whole grain/100% whole grain grain and bread.   - eat fruits and vegetables with skins.   - eat whole fresh or frozen vegetables and fruit not juices.   - can add some beans or peas into soup, salads, stews.   - can eat nuts as a snack or along with a meal.   - if having potatoes or sweet potatoes, eat the skins too as these have more fiber.    2.  Continue eating 10 prunes daily.    3.  Eat 2 kiwi fruits per day (some research shows that eating 2 kiwi fruits per day may be helpful for constipation).    4.  Consistently drink at least 48 oz-64 oz water per day (which is the equivalent of 3 to 4 of those 16.9 oz water bottles or 6-8 cups per day).    5.  Eat at least a couple of servings of gluten per day for at least 6 weeks before your endoscopy (start at least in early to mid-May, eating two servings per day).    --For example 1 serving of gluten is equal to about 1 slice of regular bread or 1 small dinner roll or 1/2 cup cooked pasta or 1/2 cup cooked cous cous or 1/2 cup cooked bulgur wheat (regular tabbouleh).     --If you only want to eat the gluten once per day, then you could eats 2 slices of bread for a sandwich at a meal or 1 cup of cooked pasta/cous cous/bulgur wheat instead of eating a 1/2 cup at two different meals.    Here are some ideas we discussed for foods with gluten:    --at lunch instead of having rice along with your chicken and sweet potato, has 1/2-1 cup of cous cous or 1/2-1 cup of whole grain pasta or 1/2 cup-1 cup of bulgur wheat (such as tabbouleh).     --can make a stir burgess with soy sauce or other sauce that contains wheat flour.    --can have some granola that contains some gluten in your yogurt in the morning at breakfast.    --can have some whole grain wheat crackers along with a meal or as a snack.    --if going to [facility], you could have a flour tortilla along with your burrito bowl or have  the burrito with the tortilla (but you don't have to eat the entire tortilla or could divide the burrito into two smaller meals and eat half one day and the other half the next day.    --for a dessert/snack, you can have your favorite croissant or the butter cookies made with wheat flour.    6. If the results of your endoscopy show that you have celiac disease, then you should follow a strict gluten free diet and prevent cross contamination of gluten ingredients. (We can discussed this in more detail at a follow up visit about 1-2 weeks after your endoscopy).    If the results do not show that you have celiac disease and you are told you likely have gluten intolerance, then you do not need to follow a strict gluten free diet, but you can minimize your intake of gluten and follow a mostly gluten free diet as you tolerate.    Nutrition Monitoring and Evaluation: Will monitor adherence to nutrition recommendations at future RD visits.     Further Medical Nutrition Therapy:  Follow-up about a week or two after your endoscopy in July.    Patient was encouraged to contact RD with any further questions.    Nae Gamboa, MS, RD, LD

## 2024-03-26 NOTE — LETTER
3/26/2024         RE: Deandra Colón  [address]  [place] [address]        Dear Colleague,    Thank you for referring your patient, Deandra Colón, to the [facility] [place]. Please see a copy of my visit note below.    [facility] Outpatient Medical Nutrition Therapy      Time Spent:  45 minutes  Session Type:  Initial   Referring Physician:  Michelle Lara PA-C  Reason for RD Visit:   abdominal pain and bloating    Nutrition Assessment:  Patient is a 59 year old female with history that includes abdominal pain, bloating. She stated that she was just diagnosed with decreased renal function which is new dx for her. Her PCP noted may be related to NSAIDs.     She stated that with eating gluten she will have LLQ abdominal pain, bloating. She has issues with constipation, but since she has been eating 10 prunes everyday in the morning, she is now having BMs and denies any current issues with constipation. If she eats any less than 10 prunes or skips eating them, then doesn't have a BM. She stated that she has had a couple labs for celiac and one lab was abnormal but her recent lab wasn't. She has an EGD scheduled for early July so NATHALY told her to do a gluten challenge for at least 6 week prior to that EGD. She had some questions about this. She has been eating some gluten before her recent labs but did not eat daily for the 14 days before the labs. She is trying to incorporate some now. There are some foods she likes such as croissants but she doesn't want to eat these daily and she doesn't want to gain weight with adding in more starches into her diet especially as she doesn't typically eat a lot of starches and breads, even GF option regularly. With eating more gluten now for labs and future testing, she has been getting a migraine and stomach pain and bloating. For the last year, she had been eating mostly gluten free but not strictly GF. She stated that  "following a strict GF diet is difficult to do. She had some questions about celiac disease vs gluten intolerance. She hasn't started taking metamucil yet as discussed with her NATHALY because with eating the 10 prunes per day, she wonders if she needs to take the fiber as well. Encouraged her to discussed with NATHALY. (Writer will message NATHALY as well).     Patient Active Problem List   Diagnosis    Right shoulder pain    Adhesive capsulitis of right shoulder    Biceps tendinitis of right upper extremity    Glaucoma suspect    Major depression, single episode    Tendinopathy of right rotator cuff    Pinguecula    Posttraumatic stress disorder    Right sided temporal headache    Special screening for malignant neoplasms, colon    Uterine leiomyoma    Osteoarthritis of glenohumeral joint, right    Episode of recurrent major depressive disorder, unspecified depression episode severity (H24)     Height:   Ht Readings from Last 1 Encounters:   03/07/24 1.549 m (5' 1\")     Weight:  Wt Readings from Last 10 Encounters:   03/26/24 55.8 kg (123 lb)   03/07/24 56.2 kg (123 lb 14.4 oz)   01/24/24 57.2 kg (126 lb)   02/06/23 56.7 kg (125 lb)   04/14/22 56.2 kg (124 lb)   11/11/21 56.2 kg (124 lb)   10/14/21 56.2 kg (124 lb)   10/08/21 56.2 kg (124 lb)   08/13/21 56.7 kg (125 lb)   01/23/20 65 kg (143 lb 4 oz)     BMI: Estimated body mass index is 23.24 kg/m  as calculated from the following:    Height as of 3/7/24: 1.549 m (5' 1\").    Weight as of this encounter: 55.8 kg (123 lb).    Diet Recall:  (some usual/recent meals/snacks/beverages):  Meal Food    Breakfast 10 prunes, yogurt, peanuts or prune, yogurt and banana or belgium waffle with ban, strawberries and whipped cream   Lunch 11:30-12 PM: chicken, rice, sweet potato or 1/2 dinner roll with tuna salad   Dinner Protein ( roasted chicken/salmon) vegetables (cucumber, avocado).    Snacks Yogurt, peauts and if afternoon snack: milk juju, blueberries and in evening: melon or " cherries or popcorn. In afternoon butter cookies, croissant   Beverages Coffee with steamed milk in AM, 1-2 hot tea, ~40 oz water, sometimes small amt coke with popcorn, occas OJ   Alcohol Intake none     Frequency of eating/taking out meals: 2-3x/week has chipotle, may get a croissant take out     Labs:    Last Comprehensive Metabolic Panel:  Sodium   Date Value Ref Range Status   03/21/2024 142 135 - 145 mmol/L Final     Comment:     Reference intervals for this test were updated on 09/26/2023 to more accurately reflect our healthy population. There may be differences in the flagging of prior results with similar values performed with this method. Interpretation of those prior results can be made in the context of the updated reference intervals.      Potassium   Date Value Ref Range Status   03/21/2024 4.2 3.4 - 5.3 mmol/L Final   04/11/2019 3.9 3.5 - 5.0 mmol/L Final     Chloride   Date Value Ref Range Status   03/21/2024 105 98 - 107 mmol/L Final   04/11/2019 108 (H) 98 - 107 mmol/L Final     Carbon Dioxide (CO2)   Date Value Ref Range Status   03/21/2024 25 22 - 29 mmol/L Final   04/11/2019 27 22 - 31 mmol/L Final     Anion Gap   Date Value Ref Range Status   03/21/2024 12 7 - 15 mmol/L Final   04/11/2019 9 5 - 18 mmol/L Final     Glucose   Date Value Ref Range Status   03/21/2024 79 70 - 99 mg/dL Final   04/11/2019 103 70 - 125 mg/dL Final     Urea Nitrogen   Date Value Ref Range Status   03/21/2024 16.3 8.0 - 23.0 mg/dL Final   04/11/2019 12 8 - 22 mg/dL Final     Creatinine   Date Value Ref Range Status   03/21/2024 1.13 (H) 0.51 - 0.95 mg/dL Final     GFR Estimate   Date Value Ref Range Status   03/21/2024 55 (L) >60 mL/min/1.73m2 Final   04/11/2019 >60 >60 mL/min/1.73m2 Final     Calcium   Date Value Ref Range Status   03/21/2024 10.1 8.8 - 10.2 mg/dL Final     CBC RESULTS:   Recent Labs   Lab Test 01/24/24  1235   WBC 4.4   RBC 4.50   HGB 11.9   HCT 36.2   MCV 80   MCH 26.4*   MCHC 32.9   RDW 13.4   PLT  229     Pertinent Medications/vitamin and mineral supplements:      Current Outpatient Medications   Medication    sertraline (ZOLOFT) 50 MG tablet    ibuprofen (ADVIL,MOTRIN) 600 MG tablet    multivitamin therapeutic (THERAGRAN) tablet    omega-3/dha/epa/fish oil (FISH OIL-OMEGA-3 FATTY ACIDS) 300-1,000 mg capsule    sertraline (ZOLOFT) 25 MG tablet     No current facility-administered medications for this visit.     Food Allergies:  NKFA     MALNUTRITION:  % Weight Loss:  None noted  % Intake:  No decreased intake noted  Subcutaneous Fat Loss:  None observed  Muscle Loss:  None observed  Fluid Retention:  None noted    Malnutrition Diagnosis: Patient does not meet two of the above criteria necessary for diagnosing malnutrition    Nutrition Prescription: Nutrition Education     Nutrition Intervention      Provided diet education for abdominal pain and bloating, constipation, possible celiac dz and/or gluten intolerance. Answered her question regarding the difference between celiac or gluten sensitivity. Discussed diet tips for increasing fiber, higher fiber diet with some specific tips based on her preferences and usual meals. discussed ideas for gluten challenge, including options that are not necessarily bread such as soy sauce, some granola containing gluten in her morning yogurt, whole grain crackers made with wheat, cous cous instead of rice at her lunch meal or small serving whole grain pasta instead of other starchy at a meal.     Answered patient's questions. Patient verbalized understanding of education provided. See Goals below.     Goals:    Eat a higher fiber diet, recommendation is to gradually increase the fiber you eat to consume a total of about 25 grams to 30 grams per day. Fiber is found in fruits, vegetables, nuts, seeds and whole grain starches.    --When increasing fiber in diet, do so gradually and do not eat too much fiber all of a sudden and also try not to load up on a lot of fiber all at  one meal.    --add some adams seeds into your yogurt along with the peanuts and prunes at breakfast.    --Here are some other general tips to increase fiber:   - choose whole grain/100% whole grain grain and bread.   - eat fruits and vegetables with skins.   - eat whole fresh or frozen vegetables and fruit not juices.   - can add some beans or peas into soup, salads, stews.   - can eat nuts as a snack or along with a meal.   - if having potatoes or sweet potatoes, eat the skins too as these have more fiber.    2.  Continue eating 10 prunes daily.    3.  Eat 2 kiwi fruits per day (some research shows that eating 2 kiwi fruits per day may be helpful for constipation).    4.  Consistently drink at least 48 oz-64 oz water per day (which is the equivalent of 3 to 4 of those 16.9 oz water bottles or 6-8 cups per day).    5.  Eat at least a couple of servings of gluten per day for at least 6 weeks before your endoscopy (start at least in early to mid-May, eating two servings per day).    --For example 1 serving of gluten is equal to about 1 slice of regular bread or 1 small dinner roll or 1/2 cup cooked pasta or 1/2 cup cooked cous cous or 1/2 cup cooked bulgur wheat (regular tabbouleh).     --If you only want to eat the gluten once per day, then you could eats 2 slices of bread for a sandwich at a meal or 1 cup of cooked pasta/cous cous/bulgur wheat instead of eating a 1/2 cup at two different meals.    Here are some ideas we discussed for foods with gluten:    --at lunch instead of having rice along with your chicken and sweet potato, has 1/2-1 cup of cous cous or 1/2-1 cup of whole grain pasta or 1/2 cup-1 cup of bulgur wheat (such as tabbouleh).     --can make a stir burgess with soy sauce or other sauce that contains wheat flour.    --can have some granola that contains some gluten in your yogurt in the morning at breakfast.    --can have some whole grain wheat crackers along with a meal or as a snack.    --if going to  [employer]otle, you could have a flour tortilla along with your burrito bowl or have the burrito with the tortilla (but you don't have to eat the entire tortilla or could divide the burrito into two smaller meals and eat half one day and the other half the next day.    --for a dessert/snack, you can have your favorite croissant or the butter cookies made with wheat flour.    6. If the results of your endoscopy show that you have celiac disease, then you should follow a strict gluten free diet and prevent cross contamination of gluten ingredients. (We can discussed this in more detail at a follow up visit about 1-2 weeks after your endoscopy).    If the results do not show that you have celiac disease and you are told you likely have gluten intolerance, then you do not need to follow a strict gluten free diet, but you can minimize your intake of gluten and follow a mostly gluten free diet as you tolerate.    Nutrition Monitoring and Evaluation: Will monitor adherence to nutrition recommendations at future RD visits.     Further Medical Nutrition Therapy:  Follow-up about a week or two after your endoscopy in July.    Patient was encouraged to contact RD with any further questions.          Again, thank you for allowing me to participate in the care of your patient.      Sincerely,    Nae Gamboa RD

## 2024-03-26 NOTE — PATIENT INSTRUCTIONS
It was nice meeting you today. Below are the nutrition recommendations we discussed at your visit.    Please let me know if you have any additional questions.    Nutrition Recommendations    Eat a higher fiber diet, recommendation is to gradually increase the fiber you eat to consume a total of about 25 grams to 30 grams per day. Fiber is found in fruits, vegetables, nuts, seeds and whole grain starches.    --When increasing fiber in diet, do so gradually and do not eat too much fiber all of a sudden and also try not to load up on a lot of fiber all at one meal.    --add some adams seeds into your yogurt along with the peanuts and prunes at breakfast.    --Here are some other general tips to increase fiber:   - choose whole grain/100% whole grain grain and bread.   - eat fruits and vegetables with skins.   - eat whole fresh or frozen vegetables and fruit not juices.   - can add some beans or peas into soup, salads, stews.   - can eat nuts as a snack or along with a meal.   - if having potatoes or sweet potatoes, eat the skins too as these have more fiber.    2.  Continue eating 10 prunes daily.    3.  Eat 2 kiwi fruits per day (some research shows that eating 2 kiwi fruits per day may be helpful for constipation).    4.  Consistently drink at least 48 oz-64 oz water per day (which is the equivalent of 3 to 4 of those 16.9 oz water bottles or 6-8 cups per day).    5.  Eat at least a couple of servings of gluten per day for at least 6 weeks before your endoscopy (start at least in early to mid-May, eating two servings per day).    --For example 1 serving of gluten is equal to about 1 slice of regular bread or 1 small dinner roll or 1/2 cup cooked pasta or 1/2 cup cooked cous cous or 1/2 cup cooked bulgur wheat (regular tabbouleh).     --If you only want to eat the gluten once per day, then you could eats 2 slices of bread for a sandwich at a meal or 1 cup of cooked pasta/cous cous/bulgur wheat instead of eating a 1/2  cup at two different meals.    Here are some ideas we discussed for foods with gluten:    --at lunch instead of having rice along with your chicken and sweet potato, has 1/2-1 cup of cous cous or 1/2-1 cup of whole grain pasta or 1/2 cup-1 cup of bulgur wheat (such as tabbouleh).     --can make a stir burgess with soy sauce or other sauce that contains wheat flour.    --can have some granola that contains some gluten in your yogurt in the morning at breakfast.    --can have some whole grain wheat crackers along with a meal or as a snack.    --if going to [facility], you could have a flour tortilla along with your burrito bowl or have the burrito with the tortilla (but you don't have to eat the entire tortilla or could divide the burrito into two smaller meals and eat half one day and the other half the next day.    --for a dessert/snack, you can have your favorite croissant or the butter cookies made with wheat flour.    6. If the results of your endoscopy show that you have celiac disease, then you should follow a strict gluten free diet and prevent cross contamination of gluten ingredients. (We can discussed this in more detail at a follow up visit about 1-2 weeks after your endoscopy).    If the results do not show that you have celiac disease and you are told you likely have gluten intolerance, then you do not need to follow a strict gluten free diet, but you can minimize your intake of gluten and follow a mostly gluten free diet as you tolerate.    Follow up about 1-2 weeks after your endoscopy in July.    For follow up appointments, please call [phone].    Thank you,    Nae Gamboa, MS, RD, LD

## 2024-03-28 NOTE — TELEPHONE ENCOUNTER
Pt called to inform us the 04/02 slot will not work because she has to follow dietary changes required by her provider before having procedure.

## 2024-04-02 ENCOUNTER — TELEPHONE (OUTPATIENT)
Dept: GASTROENTEROLOGY | Facility: CLINIC | Age: 59
End: 2024-04-02
Payer: COMMERCIAL

## 2024-04-02 NOTE — TELEPHONE ENCOUNTER
Left Voicemail (1st Attempt) and Sent Mychart (1st Attempt) for the patient to call back and schedule the following:    Appointment type: Return GI Nutrition  Provider: Nae Gamboa RD  Return date: 07/11/24  Specialty phone number: [phone]  Additional appointment(s) needed: N/A  Additonal Notes: N/A

## 2024-04-04 ENCOUNTER — TELEPHONE (OUTPATIENT)
Dept: GASTROENTEROLOGY | Facility: CLINIC | Age: 59
End: 2024-04-04
Payer: COMMERCIAL

## 2024-04-07 ENCOUNTER — MYC MEDICAL ADVICE (OUTPATIENT)
Dept: GASTROENTEROLOGY | Facility: CLINIC | Age: 59
End: 2024-04-07
Payer: COMMERCIAL

## 2024-04-07 DIAGNOSIS — R94.4 ABNORMAL RENAL FUNCTION TEST: Primary | ICD-10-CM

## 2024-04-07 DIAGNOSIS — R10.84 ABDOMINAL PAIN, GENERALIZED: ICD-10-CM

## 2024-04-09 NOTE — TELEPHONE ENCOUNTER
"If renal function and Mg OK, plan to send for Rx med for stooling. She reports nausea with use of miralax. She is reporting renal \"discomfort\" with use of low dose Mg.     -will trial low dose linzess 72 mcg   "

## 2024-04-30 ENCOUNTER — LAB (OUTPATIENT)
Dept: LAB | Facility: CLINIC | Age: 59
End: 2024-04-30
Attending: FAMILY MEDICINE
Payer: COMMERCIAL

## 2024-04-30 DIAGNOSIS — N28.9 DECREASED RENAL FUNCTION: ICD-10-CM

## 2024-04-30 DIAGNOSIS — R94.4 ABNORMAL RENAL FUNCTION TEST: ICD-10-CM

## 2024-04-30 LAB
ANION GAP SERPL CALCULATED.3IONS-SCNC: 11 MMOL/L (ref 7–15)
BUN SERPL-MCNC: 19.3 MG/DL (ref 8–23)
CALCIUM SERPL-MCNC: 9.8 MG/DL (ref 8.8–10.2)
CHLORIDE SERPL-SCNC: 108 MMOL/L (ref 98–107)
CREAT SERPL-MCNC: 1.22 MG/DL (ref 0.51–0.95)
CREAT UR-MCNC: 157 MG/DL
DEPRECATED HCO3 PLAS-SCNC: 24 MMOL/L (ref 22–29)
EGFRCR SERPLBLD CKD-EPI 2021: 50 ML/MIN/1.73M2
GLUCOSE SERPL-MCNC: 86 MG/DL (ref 70–99)
MAGNESIUM SERPL-MCNC: 2.3 MG/DL (ref 1.7–2.3)
MICROALBUMIN UR-MCNC: <12 MG/L
MICROALBUMIN/CREAT UR: NORMAL MG/G{CREAT}
POTASSIUM SERPL-SCNC: 4.2 MMOL/L (ref 3.4–5.3)
SODIUM SERPL-SCNC: 143 MMOL/L (ref 135–145)

## 2024-04-30 PROCEDURE — 82570 ASSAY OF URINE CREATININE: CPT

## 2024-04-30 PROCEDURE — 82043 UR ALBUMIN QUANTITATIVE: CPT

## 2024-04-30 PROCEDURE — 80048 BASIC METABOLIC PNL TOTAL CA: CPT

## 2024-04-30 PROCEDURE — 83735 ASSAY OF MAGNESIUM: CPT

## 2024-04-30 PROCEDURE — 36415 COLL VENOUS BLD VENIPUNCTURE: CPT

## 2024-05-01 ENCOUNTER — VIRTUAL VISIT (OUTPATIENT)
Dept: PSYCHOLOGY | Facility: CLINIC | Age: 59
End: 2024-05-01
Payer: COMMERCIAL

## 2024-05-01 DIAGNOSIS — F41.1 GAD (GENERALIZED ANXIETY DISORDER): Primary | ICD-10-CM

## 2024-05-01 PROCEDURE — 90837 PSYTX W PT 60 MINUTES: CPT | Mod: 95 | Performed by: MARRIAGE & FAMILY THERAPIST

## 2024-05-01 NOTE — PROGRESS NOTES
[facility]                                     Progress Note    Patient Name: Deandra Colón  Date: 5/1/2024         Service Type: Individual      Session Start Time: 4:00PM  Session End Time: 4:58PM     Session Length: 58 minutes    Session #: 23    Attendees: Client attended alone    Service Modality:  Video Visit:      Provider verified identity through the following two step process.  Patient provided:  Patient photo    Telemedicine Visit: The patient's condition can be safely assessed and treated via synchronous audio and visual telemedicine encounter.      Reason for Telemedicine Visit: Patient has requested telehealth visit    Originating Site (Patient Location): Patient's home    Distant Site (Provider Location): Provider Remote Setting- Home Office    Consent:  The patient/guardian has verbally consented to: the potential risks and benefits of telemedicine (video visit) versus in person care; bill my insurance or make self-payment for services provided; and responsibility for payment of non-covered services.     Patient would like the video invitation sent by:  My Chart    Mode of Communication:  Video Conference via [employer]    As the provider I attest to compliance with applicable laws and regulations related to telemedicine.    DATA  Extended Session (53+ minutes):   - Longer session due to limited access to mental health appointments and necessity to address patient's distress / complexity  Interactive Complexity: No  Crisis: No      Progress Since Last Session (Related to Symptoms / Goals / Homework):   Symptoms: No change trying to decide next steps    Homework: Partially completed      Episode of Care Goals: Minimal progress - ACTION (Actively working towards change); Intervened by reinforcing change plan / affirming steps taken     Current / Ongoing Stressors and Concerns:  Client is trying to determine whether to move home.Talked about the dysfunction with her mother and  siblings that are challenging. She continues to weigh the pros and cons. Shoulder has been mildly problematic.      Treatment Objective(s) Addressed in This Session:   Identify negative self-talk and behaviors: challenge core beliefs, myths, and actions     Intervention:   CBT: mindfulness and manage self talk  Solution Focused: job search    Assessments completed prior to visit:  The following assessments were completed by patient for this visit: None    PROMIS 10-Global Health (all questions and answers displayed):       2/22/2022     2:09 PM 5/12/2022     8:00 AM 10/5/2022     2:59 PM 3/29/2023     5:00 PM 7/6/2023    10:00 AM 1/9/2024     4:00 PM 5/1/2024     4:00 PM   PROMIS 10   In general, would you say your health is: Very good  Very good       In general, would you say your quality of life is: Very good  Good       In general, how would you rate your physical health? Very good  Very good       In general, how would you rate your mental health, including your mood and your ability to think? Very good  Very good       In general, how would you rate your satisfaction with your social activities and relationships? Good  Fair       In general, please rate how well you carry out your usual social activities and roles Very good  Very good       To what extent are you able to carry out your everyday physical activities such as walking, climbing stairs, carrying groceries, or moving a chair? Completely  Completely       In the past 7 days, how often have you been bothered by emotional problems such as feeling anxious, depressed, or irritable? Sometimes  Sometimes       In the past 7 days, how would you rate your fatigue on average? Moderate  Mild       In the past 7 days, how would you rate your pain on average, where 0 means no pain, and 10 means worst imaginable pain? 8  7       In general, would you say your health is: 4 3 4 4 4 4 3   In general, would you say your quality of life is: 4 3 3 3 4 3 3   In general,  how would you rate your physical health? 4 3 4 4 4 4 4   In general, how would you rate your mental health, including your mood and your ability to think? 4 3 4 4 3 4 4   In general, how would you rate your satisfaction with your social activities and relationships? 3 2 2 3 3 3 3   In general, please rate how well you carry out your usual social activities and roles. (This includes activities at home, at work and in your community, and responsibilities as a parent, child, spouse, employee, friend, etc.) 4 3 4 4 3 4 4   To what extent are you able to carry out your everyday physical activities such as walking, climbing stairs, carrying groceries, or moving a chair? 5 3 5 5 4 4 4   In the past 7 days, how often have you been bothered by emotional problems such as feeling anxious, depressed, or irritable? 3 2 3 3 3 3 3   In the past 7 days, how would you rate your fatigue on average? 3 2 2 2 2 2 2   In the past 7 days, how would you rate your pain on average, where 0 means no pain, and 10 means worst imaginable pain? 8 5 7 0 3 1 2   Global Mental Health Score 14 12 12 13 13 13 13   Global Physical Health Score 14 13 15 18 16 16 16   PROMIS TOTAL - SUBSCORES 28 25 27 31 29 29 29     PROMIS 10-Global Health (only subscores and total score):       2/22/2022     2:09 PM 5/12/2022     8:00 AM 10/5/2022     2:59 PM 3/29/2023     5:00 PM 7/6/2023    10:00 AM 1/9/2024     4:00 PM   PROMIS-10 Scores Only   Global Mental Health Score 14 12 12 13 13 13   Global Physical Health Score 14 13 15 18 16 16   PROMIS TOTAL - SUBSCORES 28 25 27 31 29 29         ASSESSMENT: Current Emotional / Mental Status (status of significant symptoms):   Risk status (Self / Other harm or suicidal ideation)   Patient denies current fears or concerns for personal safety.   Patient denies current or recent suicidal ideation or behaviors.   Patient denies current or recent homicidal ideation or behaviors.   Patient denies current or recent self injurious  behavior or ideation.   Patient denies other safety concerns.   Patient reports there has been no change in risk factors since their last session.     Patient reports there has been no change in protective factors since their last session.     Recommended that patient call 911 or go to the local ED should there be a change in any of these risk factors.     Appearance:   Appropriate    Eye Contact:   Good    Psychomotor Behavior: Normal    Attitude:   Interested Friendly Pleasant   Orientation:   All   Speech    Rate / Production: Normal/ Responsive Talkative    Volume:  Normal    Mood:    Anxious  Elevated    Affect:    Appropriate  Worrisome    Thought Content:  Clear    Thought Form:  Coherent  Goal Directed  Logical    Insight:    Good      Medication Review:   No changes to current psychiatric medication(s)     Medication Compliance:   Yes     Changes in Health Issues:   None reported     Chemical Use Review:   Substance Use: Chemical use reviewed, no active concerns identified      Tobacco Use: No current tobacco use.      Diagnosis:  1. PILAR (generalized anxiety disorder)        Collateral Reports Completed:   Not Applicable    PLAN: (Patient Tasks / Therapist Tasks / Other)  Homework: Client to weigh her options about moving.      Elizabeth Ivy [facility]                                     ______________________________________________________________________________________________________________________                                              Individual Treatment Plan    Patient's Name: Deandra Colón  YOB: 1965    Date of Creation: 5/12/2022  Date Treatment Plan Last Reviewed/Revised: 5/1/2024    DSM5 Diagnoses: 300.02 (F41.1) Generalized Anxiety Disorder  Psychosocial / Contextual Factors: Job loss, single, financial  PROMIS (reviewed every 90 days): 5/1/2024 Score: 29    Referral / Collaboration:  Referral to another professional/service is not indicated at this time.    Anticipated  number of session for this episode of care: 12  Anticipation frequency of session: Every 2-3 months  Anticipated Duration of each session: 38-52 minutes  Treatment plan will be reviewed in 90 days or when goals have been changed.     MeasurableTreatment Goal(s) related to diagnosis / functional impairment(s)  Goal 1: Client will lower GAD7 score to 3 or below.    I will know I've met my goal when I'm less anxious.      Objective #A (Client Action)    Client will Increase interest, engagement, and pleasure in doing things.  Status: Continued- Date(s): 5/1/2024    Intervention(s)  Therapist will assign homework to increase activity level.    Objective #B  Client will work on finding new job.  Status: Continued- Date(s): 5/1/2024    Intervention(s)  Therapist will encourage client in job search.    Objective #C  Client will Identify negative self-talk and behaviors: challenge core beliefs, myths, and actions.  Status: Continued - Date(s): 5/1/2024    Intervention(s)  Therapist will teach emotional recognition/identification and improve self talk.      Patient has reviewed and agreed to the above plan.      Elizabeth Ivy, INOCENCIA  May 1, 2024

## 2024-05-31 DIAGNOSIS — R10.84 ABDOMINAL PAIN, GENERALIZED: ICD-10-CM

## 2024-05-31 NOTE — TELEPHONE ENCOUNTER
linaclotide (LINZESS) 72 MCG capsule    Last Written Prescription Date:  4/9/24  Last Fill Quantity: 40,   # refills: 1  Last Office Visit : 3/7/24  Future Office visit:  none RTC- after EGD ( scheduled 7/2/24)     Refilled per protocol.

## 2024-06-04 ENCOUNTER — TELEPHONE (OUTPATIENT)
Dept: GASTROENTEROLOGY | Facility: CLINIC | Age: 59
End: 2024-06-04
Payer: COMMERCIAL

## 2024-06-04 NOTE — TELEPHONE ENCOUNTER
Caller: Deandra Colón      Reason for Reschedule/Cancellation   (please be detailed, any staff messages or encounters to note?): PT WANTED A SOONER DATE      Prior to reschedule please review:  Ordering Provider:     MIRI MANZANO     Sedation Determined: MAC  Does patient have any ASC Exclusions, please identify?: N      Notes on Cancelled Procedure:  Procedure: Upper Endoscopy [EGD]   Date: 07/02/2024  Location: [facility]; [address] [place] [address], [address] [place] [address]  Surgeon: IAN      Rescheduled: Yes,   Procedure: Upper Endoscopy [EGD]    Date: 06/14/2024   Location: [facility]; [address] [place] [address], [address] [place] [address]   Surgeon: WANDA   Sedation Level Scheduled  MAC ,  Reason for Sedation Level PER ORDER    Instructions updated and sent: VIA [employer]     Does patient need PAC or Pre -Op Rescheduled? : NO       Did you cancel or rescheduled an EUS procedure? No.

## 2024-06-06 ENCOUNTER — TELEPHONE (OUTPATIENT)
Dept: GASTROENTEROLOGY | Facility: CLINIC | Age: 59
End: 2024-06-06
Payer: COMMERCIAL

## 2024-06-06 NOTE — TELEPHONE ENCOUNTER
Pre visit planning completed.      Procedure details:    Patient scheduled for Upper endoscopy (EGD) on 6/14/24.     Arrival time: 0800. Procedure time 0900    Facility location: [facility]; [address] [place] [address], [address], [place] [address]. Check in location: 5th Floor.    Sedation type: MAC    Pre op exam needed? N/A    Indication for procedure:   Abdominal pain, generalized [R10.84]  - Primary      Bloating            Chart review:     Electronic implanted devices? No    Recent diagnosis of diverticulitis within the last 6 weeks? No    Diabetic? No      Medication review:    Anticoagulants? No    NSAIDS? Yes.  Ibuprofen (Advil, Motrin).  Holding interval of 1 day before procedure.    Other medication HOLDING recommendations:  N/A      Prep for procedure:     Prep instructions sent via [employer]         Umu Barker RN  Endoscopy Procedure Pre Assessment RN  [phone] option 4

## 2024-06-07 NOTE — TELEPHONE ENCOUNTER
Attempted to contact patient in order to complete pre assessment questions.     No answer. Left message to return call to [phone] option 4    Callback required communication sent via [employer].      Umu Gore RN  Endoscopy Procedure Pre Assessment

## 2024-06-11 NOTE — TELEPHONE ENCOUNTER
Second call attempt to complete pre assessment.     Patient scheduled for Upper endoscopy (EGD) on 6/14/24     No answer.  Left message to return call to [phone] #4 by next business day prior to 4PM or procedure will be sent to cancel.     Callback required communication sent via [employer].      Umu Barker RN  Endoscopy Procedure Pre Assessment

## 2024-06-11 NOTE — TELEPHONE ENCOUNTER
Pre assessment completed for upcoming procedure.   (Please see previous telephone encounter notes for complete details)    Patient  returned call.       Procedure details:    Arrival time and facility location reviewed.    Pre op exam needed? N/A    Designated  policy reviewed. Instructed to have someone stay 24  hours post procedure.       Medication review:    Medications reviewed. Please see supporting documentation below. Holding recommendations discussed (if applicable).       Prep for procedure:     Procedure prep instructions reviewed.        Any additional information needed:  N/A      Patient  verbalized understanding and had no questions or concerns at this time.      Umu Barker RN  Endoscopy Procedure Pre Assessment   [phone] option 4

## 2024-06-14 ENCOUNTER — ANESTHESIA EVENT (OUTPATIENT)
Dept: SURGERY | Facility: AMBULATORY SURGERY CENTER | Age: 59
End: 2024-06-14
Payer: COMMERCIAL

## 2024-06-14 ENCOUNTER — HOSPITAL ENCOUNTER (OUTPATIENT)
Facility: AMBULATORY SURGERY CENTER | Age: 59
Discharge: HOME | End: 2024-06-14
Attending: INTERNAL MEDICINE
Payer: COMMERCIAL

## 2024-06-14 ENCOUNTER — ANESTHESIA (OUTPATIENT)
Dept: SURGERY | Facility: AMBULATORY SURGERY CENTER | Age: 59
End: 2024-06-14
Payer: COMMERCIAL

## 2024-06-14 VITALS
TEMPERATURE: 98 F | RESPIRATION RATE: 16 BRPM | SYSTOLIC BLOOD PRESSURE: 102 MMHG | HEIGHT: 62 IN | WEIGHT: 120 LBS | HEART RATE: 71 BPM | BODY MASS INDEX: 22.08 KG/M2 | OXYGEN SATURATION: 99 % | DIASTOLIC BLOOD PRESSURE: 64 MMHG

## 2024-06-14 VITALS — HEART RATE: 89 BPM

## 2024-06-14 LAB — UPPER GI ENDOSCOPY: NORMAL

## 2024-06-14 PROCEDURE — 88305 TISSUE EXAM BY PATHOLOGIST: CPT | Mod: TC | Performed by: INTERNAL MEDICINE

## 2024-06-14 PROCEDURE — 43239 EGD BIOPSY SINGLE/MULTIPLE: CPT | Performed by: NURSE ANESTHETIST, CERTIFIED REGISTERED

## 2024-06-14 PROCEDURE — 43239 EGD BIOPSY SINGLE/MULTIPLE: CPT | Performed by: INTERNAL MEDICINE

## 2024-06-14 PROCEDURE — 43239 EGD BIOPSY SINGLE/MULTIPLE: CPT | Performed by: ANESTHESIOLOGY

## 2024-06-14 PROCEDURE — 88305 TISSUE EXAM BY PATHOLOGIST: CPT | Mod: 26 | Performed by: PATHOLOGY

## 2024-06-14 RX ORDER — ACETAMINOPHEN 325 MG/1
325-650 TABLET ORAL EVERY 6 HOURS PRN
COMMUNITY

## 2024-06-14 RX ORDER — NALOXONE HYDROCHLORIDE 0.4 MG/ML
0.2 INJECTION, SOLUTION INTRAMUSCULAR; INTRAVENOUS; SUBCUTANEOUS
Status: DISCONTINUED | OUTPATIENT
Start: 2024-06-14 | End: 2024-06-15 | Stop reason: HOSPADM

## 2024-06-14 RX ORDER — NALOXONE HYDROCHLORIDE 0.4 MG/ML
0.4 INJECTION, SOLUTION INTRAMUSCULAR; INTRAVENOUS; SUBCUTANEOUS
Status: DISCONTINUED | OUTPATIENT
Start: 2024-06-14 | End: 2024-06-15 | Stop reason: HOSPADM

## 2024-06-14 RX ORDER — SODIUM CHLORIDE, SODIUM LACTATE, POTASSIUM CHLORIDE, CALCIUM CHLORIDE 600; 310; 30; 20 MG/100ML; MG/100ML; MG/100ML; MG/100ML
INJECTION, SOLUTION INTRAVENOUS CONTINUOUS PRN
Status: DISCONTINUED | OUTPATIENT
Start: 2024-06-14 | End: 2024-06-14

## 2024-06-14 RX ORDER — LIDOCAINE 40 MG/G
CREAM TOPICAL
Status: DISCONTINUED | OUTPATIENT
Start: 2024-06-14 | End: 2024-06-14 | Stop reason: HOSPADM

## 2024-06-14 RX ORDER — PROCHLORPERAZINE MALEATE 10 MG
10 TABLET ORAL EVERY 6 HOURS PRN
Status: DISCONTINUED | OUTPATIENT
Start: 2024-06-14 | End: 2024-06-15 | Stop reason: HOSPADM

## 2024-06-14 RX ORDER — PROPOFOL 10 MG/ML
INJECTION, EMULSION INTRAVENOUS PRN
Status: DISCONTINUED | OUTPATIENT
Start: 2024-06-14 | End: 2024-06-14

## 2024-06-14 RX ORDER — FLUMAZENIL 0.1 MG/ML
0.2 INJECTION, SOLUTION INTRAVENOUS
Status: ACTIVE | OUTPATIENT
Start: 2024-06-14 | End: 2024-06-14

## 2024-06-14 RX ORDER — PROPOFOL 10 MG/ML
INJECTION, EMULSION INTRAVENOUS CONTINUOUS PRN
Status: DISCONTINUED | OUTPATIENT
Start: 2024-06-14 | End: 2024-06-14

## 2024-06-14 RX ORDER — ONDANSETRON 4 MG/1
4 TABLET, ORALLY DISINTEGRATING ORAL EVERY 6 HOURS PRN
Status: DISCONTINUED | OUTPATIENT
Start: 2024-06-14 | End: 2024-06-15 | Stop reason: HOSPADM

## 2024-06-14 RX ORDER — ONDANSETRON 2 MG/ML
4 INJECTION INTRAMUSCULAR; INTRAVENOUS EVERY 6 HOURS PRN
Status: DISCONTINUED | OUTPATIENT
Start: 2024-06-14 | End: 2024-06-15 | Stop reason: HOSPADM

## 2024-06-14 RX ORDER — ONDANSETRON 2 MG/ML
4 INJECTION INTRAMUSCULAR; INTRAVENOUS
Status: DISCONTINUED | OUTPATIENT
Start: 2024-06-14 | End: 2024-06-14 | Stop reason: HOSPADM

## 2024-06-14 RX ORDER — LIDOCAINE HYDROCHLORIDE 20 MG/ML
INJECTION, SOLUTION INFILTRATION; PERINEURAL PRN
Status: DISCONTINUED | OUTPATIENT
Start: 2024-06-14 | End: 2024-06-14

## 2024-06-14 RX ADMIN — PROPOFOL 250 MCG/KG/MIN: 10 INJECTION, EMULSION INTRAVENOUS at 09:23

## 2024-06-14 RX ADMIN — LIDOCAINE HYDROCHLORIDE 50 MG: 20 INJECTION, SOLUTION INFILTRATION; PERINEURAL at 09:22

## 2024-06-14 RX ADMIN — PROPOFOL 50 MG: 10 INJECTION, EMULSION INTRAVENOUS at 09:22

## 2024-06-14 RX ADMIN — SODIUM CHLORIDE, SODIUM LACTATE, POTASSIUM CHLORIDE, CALCIUM CHLORIDE: 600; 310; 30; 20 INJECTION, SOLUTION INTRAVENOUS at 08:44

## 2024-06-14 NOTE — H&P
Deandra Colón  [id number]  female  59 year old      Reason for procedure/surgery: Rule out celiac disease    Patient Active Problem List   Diagnosis    Right shoulder pain    Adhesive capsulitis of right shoulder    Biceps tendinitis of right upper extremity    Glaucoma suspect    Major depression, single episode    Tendinopathy of right rotator cuff    Pinguecula    Posttraumatic stress disorder    Right sided temporal headache    Special screening for malignant neoplasms, colon    Uterine leiomyoma    Osteoarthritis of glenohumeral joint, right    Episode of recurrent major depressive disorder, unspecified depression episode severity (H24)       Past Surgical History:    Past Surgical History:   Procedure Laterality Date    COLONOSCOPY N/A 3/15/2023    Procedure: COLONOSCOPY, WITH POLYPECTOMY AND BIOPSY;  Surgeon: Ariadne Hudson MD;  Location: [facility] OR    MARSUPIALIZATION BARTHOLIN CYST  08/27/2009    PARTIAL HYSTERECTOMY  04/2010       Past Medical History:   Past Medical History:   Diagnosis Date    Hair loss     Uterine fibroid        Social History:   Social History     Tobacco Use    Smoking status: Never    Smokeless tobacco: Never   Substance Use Topics    Alcohol use: Not on file       Family History:   Family History   Problem Relation Age of Onset    Vision Loss Mother     Glaucoma Mother     Kidney Disease Father     Diabetes Father        Allergies: No Known Allergies    Active Medications:   Current Outpatient Medications   Medication Sig Dispense Refill    acetaminophen (TYLENOL) 325 MG tablet Take 325-650 mg by mouth every 6 hours as needed for mild pain      linaclotide (LINZESS) 72 MCG capsule Take 1 capsule (72 mcg) by mouth every morning (before breakfast) 30 capsule 1    ibuprofen (ADVIL,MOTRIN) 600 MG tablet [IBUPROFEN (ADVIL,MOTRIN) 600 MG TABLET] Take 1 tablet (600 mg total) by mouth every 6 (six) hours as needed for pain. (Patient not taking: Reported on 3/26/2024) 60 tablet 1     "multivitamin therapeutic (THERAGRAN) tablet [MULTIVITAMIN THERAPEUTIC (THERAGRAN) TABLET] Take 1 tablet by mouth daily. (Patient not taking: Reported on 3/26/2024)      omega-3/dha/epa/fish oil (FISH OIL-OMEGA-3 FATTY ACIDS) 300-1,000 mg capsule Take 2 g by mouth daily (Patient not taking: Reported on 3/26/2024)      sertraline (ZOLOFT) 25 MG tablet Take 1 tablet (25 mg) by mouth daily for 30 days 30 tablet 0    sertraline (ZOLOFT) 50 MG tablet Take 1 tablet (50 mg) by mouth daily Start Zoloft 50 mg after completing one month of zoloft 25 mg daily. 90 tablet 0       Systemic Review:   CONSTITUTIONAL: NEGATIVE for fever, chills, change in weight  ENT/MOUTH: NEGATIVE for ear, mouth and throat problems  RESP: NEGATIVE for significant cough or SOB  CV: NEGATIVE for chest pain, palpitations or peripheral edema    Physical Examination:   Vital Signs: BP 91/68 (BP Location: Right arm)   Pulse 63   Temp 97.7  F (36.5  C) (Temporal)   Resp 17   Ht 1.575 m (5' 2\")   Wt 54.4 kg (120 lb)   SpO2 98%   BMI 21.95 kg/m    GENERAL: healthy, alert and no distress  NECK: no adenopathy, no asymmetry, masses, or scars  RESP: lungs clear to auscultation - no rales, rhonchi or wheezes  CV: regular rate and rhythm, normal S1 S2, no S3 or S4, no murmur, click or rub, no peripheral edema and peripheral pulses strong  ABDOMEN: soft, nontender, no hepatosplenomegaly, no masses and bowel sounds normal  MS: no gross musculoskeletal defects noted, no edema    Plan: Appropriate to proceed as scheduled.      Fabrice Dale MD  6/14/2024    PCP:  Dominic Hicmkan Y    "

## 2024-06-14 NOTE — ANESTHESIA PREPROCEDURE EVALUATION
Anesthesia Pre-Procedure Evaluation    Patient: Deandra Colón   MRN: [id number] : 1965        Procedure : Procedure(s):  Esophagoscopy, gastroscopy, duodenoscopy (EGD), combined          Past Medical History:   Diagnosis Date    Hair loss     Uterine fibroid       Past Surgical History:   Procedure Laterality Date    COLONOSCOPY N/A 3/15/2023    Procedure: COLONOSCOPY, WITH POLYPECTOMY AND BIOPSY;  Surgeon: Ariadne Hudson MD;  Location: [facility] OR    MARSUPIALIZATION BARTHOLIN CYST  2009    PARTIAL HYSTERECTOMY  2010      No Known Allergies   Social History     Tobacco Use    Smoking status: Never    Smokeless tobacco: Never   Substance Use Topics    Alcohol use: Not on file      Wt Readings from Last 1 Encounters:   24 54.4 kg (120 lb)        Anesthesia Evaluation            ROS/MED HX  ENT/Pulmonary:  - neg pulmonary ROS     Neurologic:  - neg neurologic ROS     Cardiovascular:  - neg cardiovascular ROS     METS/Exercise Tolerance: >4 METS    Hematologic:  - neg hematologic  ROS     Musculoskeletal:  - neg musculoskeletal ROS     GI/Hepatic:  - neg GI/hepatic ROS     Renal/Genitourinary:  - neg Renal ROS     Endo:  - neg endo ROS     Psychiatric/Substance Use:  - neg psychiatric ROS     Infectious Disease:  - neg infectious disease ROS     Malignancy:  - neg malignancy ROS     Other:  - neg other ROS          Physical Exam    Airway  airway exam normal      Mallampati: II   TM distance: > 3 FB   Neck ROM: full   Mouth opening: > 3 cm    Respiratory Devices and Support         Dental       (+) Completely normal teeth      Cardiovascular          Rhythm and rate: regular and normal     Pulmonary   pulmonary exam normal        breath sounds clear to auscultation           OUTSIDE LABS:  CBC:   Lab Results   Component Value Date    WBC 4.4 2024    WBC 6.7 2019    HGB 11.9 2024    HGB 12.1 2019    HCT 36.2 2024    HCT 36.6 2019     2024      "04/11/2019     BMP:   Lab Results   Component Value Date     04/30/2024     03/21/2024    POTASSIUM 4.2 04/30/2024    POTASSIUM 4.2 03/21/2024    CHLORIDE 108 (H) 04/30/2024    CHLORIDE 105 03/21/2024    CO2 24 04/30/2024    CO2 25 03/21/2024    BUN 19.3 04/30/2024    BUN 16.3 03/21/2024    CR 1.22 (H) 04/30/2024    CR 1.13 (H) 03/21/2024    GLC 86 04/30/2024    GLC 79 03/21/2024     COAGS: No results found for: \"PTT\", \"INR\", \"FIBR\"  POC: No results found for: \"BGM\", \"HCG\", \"HCGS\"  HEPATIC:   Lab Results   Component Value Date    ALBUMIN 4.4 01/24/2024    PROTTOTAL 7.6 01/24/2024    ALT 17 01/24/2024    AST 22 01/24/2024    ALKPHOS 55 01/24/2024    BILITOTAL 0.3 01/24/2024     OTHER:   Lab Results   Component Value Date    ANN 9.8 04/30/2024    MAG 2.3 04/30/2024    TSH 1.77 01/24/2024       Anesthesia Plan    ASA Status:  1    NPO Status:  NPO Appropriate    Anesthesia Type: MAC.     - Reason for MAC: immobility needed, straight local not clinically adequate   Induction: Intravenous.   Maintenance: TIVA.        Consents    Anesthesia Plan(s) and associated risks, benefits, and realistic alternatives discussed. Questions answered and patient/representative(s) expressed understanding.     - Discussed:     - Discussed with:  Patient      - Extended Intubation/Ventilatory Support Discussed: No.      - Patient is DNR/DNI Status: No     Use of blood products discussed: No .     Postoperative Care    Pain management: IV analgesics, Oral pain medications, Multi-modal analgesia.   PONV prophylaxis: Ondansetron (or other 5HT-3), Background Propofol Infusion     Comments:               Pierre Cochran MD    I have reviewed the pertinent notes and labs in the chart from the past 30 days and (re)examined the patient.  Any updates or changes from those notes are reflected in this note.                  "

## 2024-06-14 NOTE — ANESTHESIA CARE TRANSFER NOTE
Patient: Deandra Colón    Procedure: Procedure(s):  ESOPHAGOGASTRODUODENOSCOPY, WITH BIOPSY       Diagnosis: Bloating [R14.0]  Abdominal pain, generalized [R10.84]  Diagnosis Additional Information: No value filed.    Anesthesia Type:   MAC     Note:    Oropharynx: spontaneously breathing  Level of Consciousness: drowsy  Oxygen Supplementation: room air    Independent Airway: airway patency satisfactory and stable  Dentition: dentition unchanged  Vital Signs Stable: post-procedure vital signs reviewed and stable  Report to RN Given: handoff report given  Patient transferred to: Phase II    Handoff Report: Identifed the Patient, Identified the Reponsible Provider, Reviewed the pertinent medical history, Discussed the surgical course, Reviewed Intra-OP anesthesia mangement and issues during anesthesia, Set expectations for post-procedure period and Allowed opportunity for questions and acknowledgement of understanding  Vitals:  Vitals Value Taken Time   BP     Temp     Pulse 89 06/14/24 0930   Resp     SpO2         Electronically Signed By: FRANCISCO Burt CRNA  June 14, 2024  9:33 AM

## 2024-06-14 NOTE — ANESTHESIA POSTPROCEDURE EVALUATION
Patient: Deandra Colón    Procedure: Procedure(s):  ESOPHAGOGASTRODUODENOSCOPY, WITH BIOPSY       Anesthesia Type:  MAC    Note:  Disposition: Outpatient   Postop Pain Control: Uneventful            Sign Out: Well controlled pain   PONV: No   Neuro/Psych: Uneventful            Sign Out: Acceptable/Baseline neuro status   Airway/Respiratory: Uneventful            Sign Out: Acceptable/Baseline resp. status   CV/Hemodynamics: Uneventful            Sign Out: Acceptable CV status   Other NRE: NONE   DID A NON-ROUTINE EVENT OCCUR? No           Last vitals:  Vitals Value Taken Time   /64 06/14/24 1004   Temp 36.7  C (98  F) 06/14/24 1004   Pulse 71 06/14/24 1004   Resp 16 06/14/24 1004   SpO2 99 % 06/14/24 1004       Electronically Signed By: Pierre Cochran MD  June 14, 2024  4:45 PM

## 2024-07-17 ENCOUNTER — VIRTUAL VISIT (OUTPATIENT)
Dept: PSYCHOLOGY | Facility: CLINIC | Age: 59
End: 2024-07-17
Payer: COMMERCIAL

## 2024-07-17 DIAGNOSIS — F41.1 GAD (GENERALIZED ANXIETY DISORDER): Primary | ICD-10-CM

## 2024-07-17 PROCEDURE — 90837 PSYTX W PT 60 MINUTES: CPT | Mod: 95 | Performed by: MARRIAGE & FAMILY THERAPIST

## 2024-07-17 NOTE — PROGRESS NOTES
[facility]                                     Progress Note    Patient Name: Deandra Colón  Date: 7/17/2024         Service Type: Individual      Session Start Time: 1:00PM  Session End Time: 1:58PM     Session Length: 58 minutes    Session #: 24    Attendees: Client attended alone    Service Modality:  Video Visit:      Provider verified identity through the following two step process.  Patient provided:  Patient photo    Telemedicine Visit: The patient's condition can be safely assessed and treated via synchronous audio and visual telemedicine encounter.      Reason for Telemedicine Visit: Patient has requested telehealth visit    Originating Site (Patient Location): Patient's home    Distant Site (Provider Location): Provider Remote Setting- Home Office    Consent:  The patient/guardian has verbally consented to: the potential risks and benefits of telemedicine (video visit) versus in person care; bill my insurance or make self-payment for services provided; and responsibility for payment of non-covered services.     Patient would like the video invitation sent by:  My Chart    Mode of Communication:  Video Conference via Amwell    As the provider I attest to compliance with applicable laws and regulations related to telemedicine.    DATA  Extended Session (53+ minutes):   - Longer session due to limited access to mental health appointments and necessity to address patient's distress / complexity  Interactive Complexity: No  Crisis: No      Progress Since Last Session (Related to Symptoms / Goals / Homework):   Symptoms: No change trying to decide next steps    Homework: Partially completed      Episode of Care Goals: Minimal progress - ACTION (Actively working towards change); Intervened by reinforcing change plan / affirming steps taken     Current / Ongoing Stressors and Concerns:  Client is trying to determine whether to move home.Talked about the ongoing dysfunction with her mother  and siblings that are challenging. She has learned new information that is not favorable and causes her to not want to move there. She continues to weigh the pros and cons. She misses her friend who  and is spending time with her daughter Chiquis alfaro.      Treatment Objective(s) Addressed in This Session:   Identify negative self-talk and behaviors: challenge core beliefs, myths, and actions     Intervention:   CBT: mindfulness and manage self talk  Solution Focused: job search    Assessments completed prior to visit:  The following assessments were completed by patient for this visit: None    PROMIS 10-Global Health (all questions and answers displayed):       2022     2:09 PM 2022     8:00 AM 10/5/2022     2:59 PM 3/29/2023     5:00 PM 2023    10:00 AM 2024     4:00 PM 2024     4:00 PM   PROMIS 10   In general, would you say your health is: Very good  Very good       In general, would you say your quality of life is: Very good  Good       In general, how would you rate your physical health? Very good  Very good       In general, how would you rate your mental health, including your mood and your ability to think? Very good  Very good       In general, how would you rate your satisfaction with your social activities and relationships? Good  Fair       In general, please rate how well you carry out your usual social activities and roles Very good  Very good       To what extent are you able to carry out your everyday physical activities such as walking, climbing stairs, carrying groceries, or moving a chair? Completely  Completely       In the past 7 days, how often have you been bothered by emotional problems such as feeling anxious, depressed, or irritable? Sometimes  Sometimes       In the past 7 days, how would you rate your fatigue on average? Moderate  Mild       In the past 7 days, how would you rate your pain on average, where 0 means no pain, and 10 means worst imaginable pain? 8   7       In general, would you say your health is: 4 3 4 4 4 4 3   In general, would you say your quality of life is: 4 3 3 3 4 3 3   In general, how would you rate your physical health? 4 3 4 4 4 4 4   In general, how would you rate your mental health, including your mood and your ability to think? 4 3 4 4 3 4 4   In general, how would you rate your satisfaction with your social activities and relationships? 3 2 2 3 3 3 3   In general, please rate how well you carry out your usual social activities and roles. (This includes activities at home, at work and in your community, and responsibilities as a parent, child, spouse, employee, friend, etc.) 4 3 4 4 3 4 4   To what extent are you able to carry out your everyday physical activities such as walking, climbing stairs, carrying groceries, or moving a chair? 5 3 5 5 4 4 4   In the past 7 days, how often have you been bothered by emotional problems such as feeling anxious, depressed, or irritable? 3 2 3 3 3 3 3   In the past 7 days, how would you rate your fatigue on average? 3 2 2 2 2 2 2   In the past 7 days, how would you rate your pain on average, where 0 means no pain, and 10 means worst imaginable pain? 8 5 7 0 3 1 2   Global Mental Health Score 14 12 12 13 13 13 13   Global Physical Health Score 14 13 15 18 16 16 16   PROMIS TOTAL - SUBSCORES 28 25 27 31 29 29 29     PROMIS 10-Global Health (only subscores and total score):       2/22/2022     2:09 PM 5/12/2022     8:00 AM 10/5/2022     2:59 PM 3/29/2023     5:00 PM 7/6/2023    10:00 AM 1/9/2024     4:00 PM 5/1/2024     4:00 PM   PROMIS-10 Scores Only   Global Mental Health Score 14 12 12 13 13 13 13   Global Physical Health Score 14 13 15 18 16 16 16   PROMIS TOTAL - SUBSCORES 28 25 27 31 29 29 29         ASSESSMENT: Current Emotional / Mental Status (status of significant symptoms):   Risk status (Self / Other harm or suicidal ideation)   Patient denies current fears or concerns for personal safety.   Patient  denies current or recent suicidal ideation or behaviors.   Patient denies current or recent homicidal ideation or behaviors.   Patient denies current or recent self injurious behavior or ideation.   Patient denies other safety concerns.   Patient reports there has been no change in risk factors since their last session.     Patient reports there has been no change in protective factors since their last session.     Recommended that patient call 911 or go to the local ED should there be a change in any of these risk factors.     Appearance:   Appropriate    Eye Contact:   Good    Psychomotor Behavior: Normal    Attitude:   Interested Friendly Pleasant   Orientation:   All   Speech    Rate / Production: Normal/ Responsive Talkative    Volume:  Normal    Mood:    Anxious  Elevated    Affect:    Appropriate  Tearful Worrisome    Thought Content:  Clear    Thought Form:  Coherent  Goal Directed  Logical    Insight:    Good      Medication Review:   No current psychiatric medications prescribed     Medication Compliance:   NA     Changes in Health Issues:   None reported     Chemical Use Review:   Substance Use: Chemical use reviewed, no active concerns identified      Tobacco Use: No current tobacco use.      Diagnosis:  1. PILAR (generalized anxiety disorder)        Collateral Reports Completed:   Not Applicable    PLAN: (Patient Tasks / Therapist Tasks / Other)  Homework: Client to weigh her options about moving.      Elizabeth Ivy LMFT                                     ______________________________________________________________________________________________________________________                                              Individual Treatment Plan    Patient's Name: Deandra Colón  YOB: 1965    Date of Creation: 5/12/2022  Date Treatment Plan Last Reviewed/Revised: 5/1/2024    DSM5 Diagnoses: 300.02 (F41.1) Generalized Anxiety Disorder  Psychosocial / Contextual Factors: Job loss, single,  financial  PROMIS (reviewed every 90 days): 5/1/2024 Score: 29    Referral / Collaboration:  Referral to another professional/service is not indicated at this time.    Anticipated number of session for this episode of care: 12  Anticipation frequency of session: Every 2-3 months  Anticipated Duration of each session: 38-52 minutes  Treatment plan will be reviewed in 90 days or when goals have been changed.     MeasurableTreatment Goal(s) related to diagnosis / functional impairment(s)  Goal 1: Client will lower GAD7 score to 3 or below.    I will know I've met my goal when I'm less anxious.      Objective #A (Client Action)    Client will Increase interest, engagement, and pleasure in doing things.  Status: Continued- Date(s): 5/1/2024    Intervention(s)  Therapist will assign homework to increase activity level.    Objective #B  Client will work on finding new job.  Status: Continued- Date(s): 5/1/2024    Intervention(s)  Therapist will encourage client in job search.    Objective #C  Client will Identify negative self-talk and behaviors: challenge core beliefs, myths, and actions.  Status: Continued - Date(s): 5/1/2024    Intervention(s)  Therapist will teach emotional recognition/identification and improve self talk.      Patient has reviewed and agreed to the above plan.      Elizabeth Ivy, [facility]  May 1, 2024

## 2024-08-20 ENCOUNTER — VIRTUAL VISIT (OUTPATIENT)
Dept: PSYCHOLOGY | Facility: CLINIC | Age: 59
End: 2024-08-20
Payer: COMMERCIAL

## 2024-08-20 DIAGNOSIS — F41.1 GAD (GENERALIZED ANXIETY DISORDER): Primary | ICD-10-CM

## 2024-08-20 PROCEDURE — 90837 PSYTX W PT 60 MINUTES: CPT | Mod: 95 | Performed by: MARRIAGE & FAMILY THERAPIST

## 2024-08-20 NOTE — PROGRESS NOTES
[facility]                                     Progress Note    Patient Name: Deandra Colón  Date: 8/20/2024         Service Type: Individual      Session Start Time: 4:00PM  Session End Time: 4:58PM     Session Length: 58 minutes    Session #: 25    Attendees: Client attended alone    Service Modality:  Video Visit:      Provider verified identity through the following two step process.  Patient provided:  Patient photo    Telemedicine Visit: The patient's condition can be safely assessed and treated via synchronous audio and visual telemedicine encounter.      Reason for Telemedicine Visit: Patient has requested telehealth visit    Originating Site (Patient Location): Patient's home    Distant Site (Provider Location): Provider Remote Setting- Home Office    Consent:  The patient/guardian has verbally consented to: the potential risks and benefits of telemedicine (video visit) versus in person care; bill my insurance or make self-payment for services provided; and responsibility for payment of non-covered services.     Patient would like the video invitation sent by:  My Chart    Mode of Communication:  Video Conference via Amwell    As the provider I attest to compliance with applicable laws and regulations related to telemedicine.    DATA  Extended Session (53+ minutes):   - Longer session due to limited access to mental health appointments and necessity to address patient's distress / complexity  Interactive Complexity: No  Crisis: No      Progress Since Last Session (Related to Symptoms / Goals / Homework):   Symptoms: No change trying to decide next steps    Homework: Partially completed      Episode of Care Goals: Minimal progress - ACTION (Actively working towards change); Intervened by reinforcing change plan / affirming steps taken     Current / Ongoing Stressors and Concerns:  Client is struggling today with things not going well for her. She is feeling alone and vulnerable.  Talked about her options and the situation back home with her family.     Treatment Objective(s) Addressed in This Session:   Identify negative self-talk and behaviors: challenge core beliefs, myths, and actions     Intervention:   CBT: mindfulness and manage self talk  Solution Focused: job search    Assessments completed prior to visit:  The following assessments were completed by patient for this visit: None    PROMIS 10-Global Health (all questions and answers displayed):       2/22/2022     2:09 PM 5/12/2022     8:00 AM 10/5/2022     2:59 PM 3/29/2023     5:00 PM 7/6/2023    10:00 AM 1/9/2024     4:00 PM 5/1/2024     4:00 PM   PROMIS 10   In general, would you say your health is: Very good  Very good       In general, would you say your quality of life is: Very good  Good       In general, how would you rate your physical health? Very good  Very good       In general, how would you rate your mental health, including your mood and your ability to think? Very good  Very good       In general, how would you rate your satisfaction with your social activities and relationships? Good  Fair       In general, please rate how well you carry out your usual social activities and roles Very good  Very good       To what extent are you able to carry out your everyday physical activities such as walking, climbing stairs, carrying groceries, or moving a chair? Completely  Completely       In the past 7 days, how often have you been bothered by emotional problems such as feeling anxious, depressed, or irritable? Sometimes  Sometimes       In the past 7 days, how would you rate your fatigue on average? Moderate  Mild       In the past 7 days, how would you rate your pain on average, where 0 means no pain, and 10 means worst imaginable pain? 8  7       In general, would you say your health is: 4 3 4 4 4 4 3   In general, would you say your quality of life is: 4 3 3 3 4 3 3   In general, how would you rate your physical health? 4  3 4 4 4 4 4   In general, how would you rate your mental health, including your mood and your ability to think? 4 3 4 4 3 4 4   In general, how would you rate your satisfaction with your social activities and relationships? 3 2 2 3 3 3 3   In general, please rate how well you carry out your usual social activities and roles. (This includes activities at home, at work and in your community, and responsibilities as a parent, child, spouse, employee, friend, etc.) 4 3 4 4 3 4 4   To what extent are you able to carry out your everyday physical activities such as walking, climbing stairs, carrying groceries, or moving a chair? 5 3 5 5 4 4 4   In the past 7 days, how often have you been bothered by emotional problems such as feeling anxious, depressed, or irritable? 3 2 3 3 3 3 3   In the past 7 days, how would you rate your fatigue on average? 3 2 2 2 2 2 2   In the past 7 days, how would you rate your pain on average, where 0 means no pain, and 10 means worst imaginable pain? 8 5 7 0 3 1 2   Global Mental Health Score 14 12 12 13 13 13 13   Global Physical Health Score 14 13 15 18 16 16 16   PROMIS TOTAL - SUBSCORES 28 25 27 31 29 29 29     PROMIS 10-Global Health (only subscores and total score):       2/22/2022     2:09 PM 5/12/2022     8:00 AM 10/5/2022     2:59 PM 3/29/2023     5:00 PM 7/6/2023    10:00 AM 1/9/2024     4:00 PM 5/1/2024     4:00 PM   PROMIS-10 Scores Only   Global Mental Health Score 14 12 12 13 13 13 13   Global Physical Health Score 14 13 15 18 16 16 16   PROMIS TOTAL - SUBSCORES 28 25 27 31 29 29 29         ASSESSMENT: Current Emotional / Mental Status (status of significant symptoms):   Risk status (Self / Other harm or suicidal ideation)   Patient denies current fears or concerns for personal safety.   Patient denies current or recent suicidal ideation or behaviors.   Patient denies current or recent homicidal ideation or behaviors.   Patient denies current or recent self injurious behavior or  ideation.   Patient denies other safety concerns.   Patient reports there has been no change in risk factors since their last session.     Patient reports there has been no change in protective factors since their last session.     Recommended that patient call 911 or go to the local ED should there be a change in any of these risk factors.     Appearance:   Appropriate    Eye Contact:   Good    Psychomotor Behavior: Normal    Attitude:   Interested Friendly Pleasant   Orientation:   All   Speech    Rate / Production: Normal/ Responsive Talkative    Volume:  Normal    Mood:    Anxious  Elevated    Affect:    Appropriate  Tearful Worrisome    Thought Content:  Clear    Thought Form:  Coherent  Goal Directed  Logical    Insight:    Good      Medication Review:   No current psychiatric medications prescribed     Medication Compliance:   NA     Changes in Health Issues:   None reported     Chemical Use Review:   Substance Use: Chemical use reviewed, no active concerns identified      Tobacco Use: No current tobacco use.      Diagnosis:  1. PILAR (generalized anxiety disorder)        Collateral Reports Completed:   Not Applicable    PLAN: (Patient Tasks / Therapist Tasks / Other)  Homework: Client to weigh her options about moving. Consider talking to her .      Elizabeth Ivy, [facility]                                     ______________________________________________________________________________________________________________________                                              Individual Treatment Plan    Patient's Name: Deandra Colón  YOB: 1965    Date of Creation: 5/12/2022  Date Treatment Plan Last Reviewed/Revised: 8/20/2024    DSM5 Diagnoses: 300.02 (F41.1) Generalized Anxiety Disorder  Psychosocial / Contextual Factors: Job loss, single, financial  PROMIS (reviewed every 90 days): 8/20/2024 Score: 29    Referral / Collaboration:  Referral to another professional/service is not indicated  at this time.    Anticipated number of session for this episode of care: 12  Anticipation frequency of session: Every 2-3 months  Anticipated Duration of each session: 38-52 minutes  Treatment plan will be reviewed in 90 days or when goals have been changed.     MeasurableTreatment Goal(s) related to diagnosis / functional impairment(s)  Goal 1: Client will lower GAD7 score to 3 or below.    I will know I've met my goal when I'm less anxious.      Objective #A (Client Action)    Client will Increase interest, engagement, and pleasure in doing things.  Status: Continued- Date(s): 8/20/2024    Intervention(s)  Therapist will assign homework to increase activity level.    Objective #B  Client will work on finding new job.  Status: Continued- Date(s): 8/20/2024    Intervention(s)  Therapist will encourage client in job search.    Objective #C  Client will Identify negative self-talk and behaviors: challenge core beliefs, myths, and actions.  Status: Continued - Date(s): 8/20/2024    Intervention(s)  Therapist will teach emotional recognition/identification and improve self talk.      Patient has reviewed and agreed to the above plan.      Elizabeth Ivy, [facility]  August 20, 2024

## 2024-09-25 ENCOUNTER — E-VISIT (OUTPATIENT)
Dept: FAMILY MEDICINE | Facility: CLINIC | Age: 59
End: 2024-09-25
Payer: COMMERCIAL

## 2024-09-25 ENCOUNTER — VIRTUAL VISIT (OUTPATIENT)
Dept: PSYCHOLOGY | Facility: CLINIC | Age: 59
End: 2024-09-25
Payer: COMMERCIAL

## 2024-09-25 ENCOUNTER — MYC REFILL (OUTPATIENT)
Dept: FAMILY MEDICINE | Facility: CLINIC | Age: 59
End: 2024-09-25
Payer: COMMERCIAL

## 2024-09-25 DIAGNOSIS — F33.9 RECURRENT MAJOR DEPRESSIVE DISORDER, REMISSION STATUS UNSPECIFIED (H): Primary | ICD-10-CM

## 2024-09-25 DIAGNOSIS — F41.1 GAD (GENERALIZED ANXIETY DISORDER): Primary | ICD-10-CM

## 2024-09-25 DIAGNOSIS — F33.0 MILD EPISODE OF RECURRENT MAJOR DEPRESSIVE DISORDER (H): ICD-10-CM

## 2024-09-25 PROCEDURE — 99421 OL DIG E/M SVC 5-10 MIN: CPT | Performed by: FAMILY MEDICINE

## 2024-09-25 PROCEDURE — 90837 PSYTX W PT 60 MINUTES: CPT | Mod: 95 | Performed by: MARRIAGE & FAMILY THERAPIST

## 2024-09-25 RX ORDER — SERTRALINE HYDROCHLORIDE 25 MG/1
25 TABLET, FILM COATED ORAL DAILY
Qty: 30 TABLET | Refills: 0 | OUTPATIENT
Start: 2024-09-25

## 2024-09-25 ASSESSMENT — ANXIETY QUESTIONNAIRES
3. WORRYING TOO MUCH ABOUT DIFFERENT THINGS: SEVERAL DAYS
1. FEELING NERVOUS, ANXIOUS, OR ON EDGE: SEVERAL DAYS
4. TROUBLE RELAXING: NOT AT ALL
GAD7 TOTAL SCORE: 5
GAD7 TOTAL SCORE: 5
IF YOU CHECKED OFF ANY PROBLEMS ON THIS QUESTIONNAIRE, HOW DIFFICULT HAVE THESE PROBLEMS MADE IT FOR YOU TO DO YOUR WORK, TAKE CARE OF THINGS AT HOME, OR GET ALONG WITH OTHER PEOPLE: NOT DIFFICULT AT ALL
8. IF YOU CHECKED OFF ANY PROBLEMS, HOW DIFFICULT HAVE THESE MADE IT FOR YOU TO DO YOUR WORK, TAKE CARE OF THINGS AT HOME, OR GET ALONG WITH OTHER PEOPLE?: NOT DIFFICULT AT ALL
7. FEELING AFRAID AS IF SOMETHING AWFUL MIGHT HAPPEN: SEVERAL DAYS
6. BECOMING EASILY ANNOYED OR IRRITABLE: SEVERAL DAYS
5. BEING SO RESTLESS THAT IT IS HARD TO SIT STILL: NOT AT ALL
2. NOT BEING ABLE TO STOP OR CONTROL WORRYING: SEVERAL DAYS
7. FEELING AFRAID AS IF SOMETHING AWFUL MIGHT HAPPEN: SEVERAL DAYS
GAD7 TOTAL SCORE: 5

## 2024-09-25 ASSESSMENT — PATIENT HEALTH QUESTIONNAIRE - PHQ9
SUM OF ALL RESPONSES TO PHQ QUESTIONS 1-9: 4
10. IF YOU CHECKED OFF ANY PROBLEMS, HOW DIFFICULT HAVE THESE PROBLEMS MADE IT FOR YOU TO DO YOUR WORK, TAKE CARE OF THINGS AT HOME, OR GET ALONG WITH OTHER PEOPLE: NOT DIFFICULT AT ALL
SUM OF ALL RESPONSES TO PHQ QUESTIONS 1-9: 4

## 2024-09-25 NOTE — PROGRESS NOTES
[facility]                                     Progress Note    Patient Name: Deandra Colón  Date: 9/25/2024         Service Type: Individual      Session Start Time: 11:00AM  Session End Time: 12:00PM     Session Length: 60 minutes    Session #: 26    Attendees: Client attended alone    Service Modality:  Video Visit:      Provider verified identity through the following two step process.  Patient provided:  Patient photo    Telemedicine Visit: The patient's condition can be safely assessed and treated via synchronous audio and visual telemedicine encounter.      Reason for Telemedicine Visit: Patient has requested telehealth visit    Originating Site (Patient Location): Patient's home    Distant Site (Provider Location): Provider Remote Setting- Home Office    Consent:  The patient/guardian has verbally consented to: the potential risks and benefits of telemedicine (video visit) versus in person care; bill my insurance or make self-payment for services provided; and responsibility for payment of non-covered services.     Patient would like the video invitation sent by:  My Chart    Mode of Communication:  Video Conference via Amwell    As the provider I attest to compliance with applicable laws and regulations related to telemedicine.    DATA  Extended Session (53+ minutes):   - Longer session due to limited access to mental health appointments and necessity to address patient's distress / complexity  Interactive Complexity: No  Crisis: No      Progress Since Last Session (Related to Symptoms / Goals / Homework):   Symptoms: No change trying to decide next steps    Homework: Partially completed      Episode of Care Goals: Minimal progress - ACTION (Actively working towards change); Intervened by reinforcing change plan / affirming steps taken     Current / Ongoing Stressors and Concerns:  Client is struggling today with things not going well for her. She talked to her mother who was very  negative and elevated. Deandra tried to encourage her and  her but she wasn't receptive. Deandra felt badly after they hung up and remains quite discouraged by her circumstances. 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 She has decided to stay long enough to see if the election brings about any change or she will go to [place]. Talked about a man at the coffee shop who continues to stare at her and Deandra has been unsure how to handle the attention.     Treatment Objective(s) Addressed in This Session:   Identify negative self-talk and behaviors: challenge core beliefs, myths, and actions     Intervention:   CBT: mindfulness and manage self talk  Solution Focused: job search    Assessments completed prior to visit:  The following assessments were completed by patient for this visit: None    PROMIS 10-Global Health (all questions and answers displayed):       5/12/2022     8:00 AM 10/5/2022     2:59 PM 3/29/2023     5:00 PM 7/6/2023    10:00 AM 1/9/2024     4:00 PM 5/1/2024     4:00 PM 8/20/2024     4:00 PM   PROMIS 10   In general, would you say your health is:  Very good        In general, would you say your quality of life is:  Good        In general, how would you rate your physical health?  Very good        In general, how would you rate your mental health, including your mood and your ability to think?  Very good        In general, how would you rate your satisfaction with your social activities and relationships?  Fair        In general, please rate how well you carry out your usual social activities and roles  Very good        To what extent are you able to carry out your everyday physical activities such as walking, climbing stairs, carrying groceries, or moving a chair?  Completely        In the past 7 days, how often have you been bothered by emotional problems such as feeling anxious, depressed, or irritable?  Sometimes        In the past 7 days, how would you  rate your fatigue on average?  Mild        In the past 7 days, how would you rate your pain on average, where 0 means no pain, and 10 means worst imaginable pain?  7        In general, would you say your health is: 3 4 4 4 4 3 4   In general, would you say your quality of life is: 3 3 3 4 3 3 3   In general, how would you rate your physical health? 3 4 4 4 4 4 3   In general, how would you rate your mental health, including your mood and your ability to think? 3 4 4 3 4 4 3   In general, how would you rate your satisfaction with your social activities and relationships? 2 2 3 3 3 3 3   In general, please rate how well you carry out your usual social activities and roles. (This includes activities at home, at work and in your community, and responsibilities as a parent, child, spouse, employee, friend, etc.) 3 4 4 3 4 4 3   To what extent are you able to carry out your everyday physical activities such as walking, climbing stairs, carrying groceries, or moving a chair? 3 5 5 4 4 4 4   In the past 7 days, how often have you been bothered by emotional problems such as feeling anxious, depressed, or irritable? 2 3 3 3 3 3 3   In the past 7 days, how would you rate your fatigue on average? 2 2 2 2 2 2 2   In the past 7 days, how would you rate your pain on average, where 0 means no pain, and 10 means worst imaginable pain? 5 7 0 3 1 2 2   Global Mental Health Score 12 12 13 13 13 13 12   Global Physical Health Score 13 15 18 16 16 16 15   PROMIS TOTAL - SUBSCORES 25 27 31 29 29 29 27     PROMIS 10-Global Health (only subscores and total score):       5/12/2022     8:00 AM 10/5/2022     2:59 PM 3/29/2023     5:00 PM 7/6/2023    10:00 AM 1/9/2024     4:00 PM 5/1/2024     4:00 PM 8/20/2024     4:00 PM   PROMIS-10 Scores Only   Global Mental Health Score 12 12 13 13 13 13 12   Global Physical Health Score 13 15 18 16 16 16 15   PROMIS TOTAL - SUBSCORES 25 27 31 29 29 29 27         ASSESSMENT: Current Emotional / Mental  Status (status of significant symptoms):   Risk status (Self / Other harm or suicidal ideation)   Patient denies current fears or concerns for personal safety.   Patient denies current or recent suicidal ideation or behaviors.   Patient denies current or recent homicidal ideation or behaviors.   Patient denies current or recent self injurious behavior or ideation.   Patient denies other safety concerns.   Patient reports there has been no change in risk factors since their last session.     Patient reports there has been no change in protective factors since their last session.     Recommended that patient call 911 or go to the local ED should there be a change in any of these risk factors.     Appearance:   Appropriate    Eye Contact:   Good    Psychomotor Behavior: Normal    Attitude:   Interested Friendly Pleasant   Orientation:   All   Speech    Rate / Production: Normal/ Responsive Talkative    Volume:  Normal    Mood:    Anxious  Elevated    Affect:    Appropriate  Tearful Worrisome    Thought Content:  Clear    Thought Form:  Coherent  Goal Directed  Logical    Insight:    Good      Medication Review:   No current psychiatric medications prescribed     Medication Compliance:   NA     Changes in Health Issues:   None reported     Chemical Use Review:   Substance Use: Chemical use reviewed, no active concerns identified      Tobacco Use: No current tobacco use.      Diagnosis:  1. PILAR (generalized anxiety disorder)        Collateral Reports Completed:   Not Applicable    PLAN: (Patient Tasks / Therapist Tasks / Other)  Homework:  Consider options and meet with doctor to discuss medication.      INOCENCIA Sosa                                     ______________________________________________________________________________________________________________________                                              Individual Treatment Plan    Patient's Name: Deandra Colón  YOB: 1965    Date of  Creation: 5/12/2022  Date Treatment Plan Last Reviewed/Revised: 8/20/2024    DSM5 Diagnoses: 300.02 (F41.1) Generalized Anxiety Disorder  Psychosocial / Contextual Factors: Job loss, single, financial  PROMIS (reviewed every 90 days): 8/20/2024 Score: 29    Referral / Collaboration:  Referral to another professional/service is not indicated at this time.    Anticipated number of session for this episode of care: 12  Anticipation frequency of session: Every 2-3 months  Anticipated Duration of each session: 38-52 minutes  Treatment plan will be reviewed in 90 days or when goals have been changed.     MeasurableTreatment Goal(s) related to diagnosis / functional impairment(s)  Goal 1: Client will lower GAD7 score to 3 or below.    I will know I've met my goal when I'm less anxious.      Objective #A (Client Action)    Client will Increase interest, engagement, and pleasure in doing things.  Status: Continued- Date(s): 8/20/2024    Intervention(s)  Therapist will assign homework to increase activity level.    Objective #B  Client will work on finding new job.  Status: Continued- Date(s): 8/20/2024    Intervention(s)  Therapist will encourage client in job search.    Objective #C  Client will Identify negative self-talk and behaviors: challenge core beliefs, myths, and actions.  Status: Continued - Date(s): 8/20/2024    Intervention(s)  Therapist will teach emotional recognition/identification and improve self talk.      Patient has reviewed and agreed to the above plan.      Elizabeth Ivy, INOCENCIA  August 20, 2024

## 2024-09-26 ASSESSMENT — PATIENT HEALTH QUESTIONNAIRE - PHQ9: SUM OF ALL RESPONSES TO PHQ QUESTIONS 1-9: 4

## 2024-11-12 ENCOUNTER — TELEPHONE (OUTPATIENT)
Dept: FAMILY MEDICINE | Facility: CLINIC | Age: 59
End: 2024-11-12
Payer: COMMERCIAL

## 2024-11-12 ENCOUNTER — TELEPHONE (OUTPATIENT)
Dept: GASTROENTEROLOGY | Facility: CLINIC | Age: 59
End: 2024-11-12
Payer: COMMERCIAL

## 2024-11-12 DIAGNOSIS — N28.9 DECREASED RENAL FUNCTION: Primary | ICD-10-CM

## 2024-11-12 NOTE — TELEPHONE ENCOUNTER
[facility]    Phone Message    May a detailed message be left on voicemail: yes     Reason for Call: Other: Requesting a call back, pt requesting to be seen this month as their insurance will run out this month. First available is Jan 24. Pt also has medical questions, thinks she has an infection.      Action Taken: Other: csc gi     Travel Screening: Not Applicable     Date of Service:

## 2024-11-13 NOTE — TELEPHONE ENCOUNTER
Spoke with Deandra and was able to find a sooner appointment on 11-27 at 230 with Michelle LANGFORD.   Message sent to the clinic coordinators to schedule     Asked her about the infection and she followed up with her PCP.   Deandra stated Michelle ordered an antibiotic and upon chart review she was thinking the linzess was an antibiotic. Discussed linzess for constipation and she verbalized understanding

## 2024-11-14 NOTE — TELEPHONE ENCOUNTER
Order/Referral Request    Who is requesting: Patient     Orders being requested: Kidney panel    Reason service is needed/diagnosis: Pos kidney disease left side issues     When are orders needed by: as soon as possible    Has this been discussed with Provider: Yes    Does patient have a preference on a Group/Provider/Facility? [facility]    Does patient have an appointment scheduled?: Yes: 12/19/24    Where to send orders: Place orders within Epic    Could we send this information to you in [facility] or would you prefer to receive a phone call?:   Patient would prefer a phone call   Okay to leave a detailed message?: Yes at Home number on file [phone] (home)  
Done  DM  
Dr. Hickman - patient would like labs ordered prior to 12/19 visit. Appreciate any necessary additional labs ordered, BMP pended below.    --------------------    Beginning of the year pt had labs and was told to repeat testing to assess kidney function. Patient would like those labs ordered and processed ahead of her 12/19/24 visit, in the event that any follow up is needed. She will be losing her health insurance in 2025.    RAFAEL Sargent, BSN, RN (she/her)  [facility] RN    
LVM that labs are placed and advised on scheduling   
n/a

## 2024-11-19 ENCOUNTER — LAB (OUTPATIENT)
Dept: LAB | Facility: CLINIC | Age: 59
End: 2024-11-19
Payer: COMMERCIAL

## 2024-11-19 DIAGNOSIS — N28.9 DECREASED RENAL FUNCTION: ICD-10-CM

## 2024-11-19 LAB
ANION GAP SERPL CALCULATED.3IONS-SCNC: 11 MMOL/L (ref 7–15)
BUN SERPL-MCNC: 12.3 MG/DL (ref 8–23)
CALCIUM SERPL-MCNC: 9.8 MG/DL (ref 8.8–10.4)
CHLORIDE SERPL-SCNC: 106 MMOL/L (ref 98–107)
CREAT SERPL-MCNC: 1.01 MG/DL (ref 0.51–0.95)
EGFRCR SERPLBLD CKD-EPI 2021: 63 ML/MIN/1.73M2
ERYTHROCYTE [DISTWIDTH] IN BLOOD BY AUTOMATED COUNT: 13.5 % (ref 10–15)
GLUCOSE SERPL-MCNC: 99 MG/DL (ref 70–99)
HCO3 SERPL-SCNC: 24 MMOL/L (ref 22–29)
HCT VFR BLD AUTO: 38.3 % (ref 35–47)
HGB BLD-MCNC: 12.4 G/DL (ref 11.7–15.7)
MCH RBC QN AUTO: 26.1 PG (ref 26.5–33)
MCHC RBC AUTO-ENTMCNC: 32.4 G/DL (ref 31.5–36.5)
MCV RBC AUTO: 81 FL (ref 78–100)
PLATELET # BLD AUTO: 227 10E3/UL (ref 150–450)
POTASSIUM SERPL-SCNC: 4.4 MMOL/L (ref 3.4–5.3)
RBC # BLD AUTO: 4.76 10E6/UL (ref 3.8–5.2)
SODIUM SERPL-SCNC: 141 MMOL/L (ref 135–145)
WBC # BLD AUTO: 3.3 10E3/UL (ref 4–11)

## 2024-11-19 PROCEDURE — 85027 COMPLETE CBC AUTOMATED: CPT

## 2024-11-19 PROCEDURE — 36415 COLL VENOUS BLD VENIPUNCTURE: CPT

## 2024-11-19 PROCEDURE — 80048 BASIC METABOLIC PNL TOTAL CA: CPT

## 2024-12-03 ENCOUNTER — VIRTUAL VISIT (OUTPATIENT)
Dept: PSYCHOLOGY | Facility: CLINIC | Age: 59
End: 2024-12-03
Payer: COMMERCIAL

## 2024-12-03 DIAGNOSIS — F41.1 GAD (GENERALIZED ANXIETY DISORDER): Primary | ICD-10-CM

## 2024-12-03 PROCEDURE — 90837 PSYTX W PT 60 MINUTES: CPT | Mod: 95 | Performed by: MARRIAGE & FAMILY THERAPIST

## 2024-12-03 NOTE — PROGRESS NOTES
[facility]                                     Progress Note    Patient Name: Deandra Colón  Date: 12/3/2024         Service Type: Individual      Session Start Time: 10:00AM  Session End Time: 11:00AM     Session Length: 60 minutes    Session #: 27    Attendees: Client attended alone    Service Modality:  Video Visit:      Provider verified identity through the following two step process.  Patient provided:  Patient photo    Telemedicine Visit: The patient's condition can be safely assessed and treated via synchronous audio and visual telemedicine encounter.      Reason for Telemedicine Visit: Patient has requested telehealth visit    Originating Site (Patient Location): Patient's home    Distant Site (Provider Location): Provider Remote Setting- Home Office    Consent:  The patient/guardian has verbally consented to: the potential risks and benefits of telemedicine (video visit) versus in person care; bill my insurance or make self-payment for services provided; and responsibility for payment of non-covered services.     Patient would like the video invitation sent by:  My Chart    Mode of Communication:  Video Conference via Amwell    As the provider I attest to compliance with applicable laws and regulations related to telemedicine.    DATA  Extended Session (53+ minutes):   - Longer session due to limited access to mental health appointments and necessity to address patient's distress / complexity  Interactive Complexity: No  Crisis: No      Progress Since Last Session (Related to Symptoms / Goals / Homework):   Symptoms: Improving Deandra has made an important decision    Homework: Achieved / completed to satisfaction      Episode of Care Goals: Satisfactory progress - ACTION (Actively working towards change); Intervened by reinforcing change plan / affirming steps taken     Current / Ongoing Stressors and Concerns:  Deandra has made a decision about her future that she feels good about.  She also shared that her mother passed away on 11/18 which actually provided relief for Deandra as he mother was in great pain physically.      Treatment Objective(s) Addressed in This Session:   Identify negative self-talk and behaviors: challenge core beliefs, myths, and actions     Intervention:   CBT: mindfulness and manage self talk  Solution Focused: job search    Assessments completed prior to visit:  The following assessments were completed by patient for this visit: None    PROMIS 10-Global Health (all questions and answers displayed):       10/5/2022     2:59 PM 3/29/2023     5:00 PM 7/6/2023    10:00 AM 1/9/2024     4:00 PM 5/1/2024     4:00 PM 8/20/2024     4:00 PM 12/3/2024    10:00 AM   PROMIS 10   In general, would you say your health is: Very good         In general, would you say your quality of life is: Good         In general, how would you rate your physical health? Very good         In general, how would you rate your mental health, including your mood and your ability to think? Very good         In general, how would you rate your satisfaction with your social activities and relationships? Fair         In general, please rate how well you carry out your usual social activities and roles Very good         To what extent are you able to carry out your everyday physical activities such as walking, climbing stairs, carrying groceries, or moving a chair? Completely         In the past 7 days, how often have you been bothered by emotional problems such as feeling anxious, depressed, or irritable? Sometimes         In the past 7 days, how would you rate your fatigue on average? Mild         In the past 7 days, how would you rate your pain on average, where 0 means no pain, and 10 means worst imaginable pain? 7         In general, would you say your health is: 4  4 4 4 3 4 3   In general, would you say your quality of life is: 3  3 4 3 3 3 3   In general, how would you rate your physical health? 4  4 4  4 4 3 3   In general, how would you rate your mental health, including your mood and your ability to think? 4  4 3 4 4 3 3   In general, how would you rate your satisfaction with your social activities and relationships? 2  3 3 3 3 3 3   In general, please rate how well you carry out your usual social activities and roles. (This includes activities at home, at work and in your community, and responsibilities as a parent, child, spouse, employee, friend, etc.) 4  4 3 4 4 3 4   To what extent are you able to carry out your everyday physical activities such as walking, climbing stairs, carrying groceries, or moving a chair? 5  5 4 4 4 4 4   In the past 7 days, how often have you been bothered by emotional problems such as feeling anxious, depressed, or irritable? 3  3 3 3 3 3 3   In the past 7 days, how would you rate your fatigue on average? 2  2 2 2 2 2 2   In the past 7 days, how would you rate your pain on average, where 0 means no pain, and 10 means worst imaginable pain? 7  0 3 1 2 2 2   Global Mental Health Score 12 13 13 13 13 12 12   Global Physical Health Score 15 18 16 16 16 15 15   PROMIS TOTAL - SUBSCORES 27 31 29 29 29 27 27       Patient-reported     PROMIS 10-Global Health (only subscores and total score):       10/5/2022     2:59 PM 3/29/2023     5:00 PM 7/6/2023    10:00 AM 1/9/2024     4:00 PM 5/1/2024     4:00 PM 8/20/2024     4:00 PM 12/3/2024    10:00 AM   PROMIS-10 Scores Only   Global Mental Health Score 12 13 13 13 13 12 12   Global Physical Health Score 15 18 16 16 16 15 15   PROMIS TOTAL - SUBSCORES 27 31 29 29 29 27 27         ASSESSMENT: Current Emotional / Mental Status (status of significant symptoms):   Risk status (Self / Other harm or suicidal ideation)   Patient denies current fears or concerns for personal safety.   Patient denies current or recent suicidal ideation or behaviors.   Patient denies current or recent homicidal ideation or behaviors.   Patient denies current or recent  self injurious behavior or ideation.   Patient denies other safety concerns.   Patient reports there has been no change in risk factors since their last session.     Patient reports there has been no change in protective factors since their last session.     Recommended that patient call 911 or go to the local ED should there be a change in any of these risk factors.     Appearance:   Appropriate    Eye Contact:   Good    Psychomotor Behavior: Normal    Attitude:   Interested Friendly Pleasant   Orientation:   All   Speech    Rate / Production: Normal/ Responsive Talkative    Volume:  Normal    Mood:    Normal   Affect:    Appropriate    Thought Content:  Clear    Thought Form:  Coherent  Goal Directed  Logical    Insight:    Good      Medication Review:   No current psychiatric medications prescribed     Medication Compliance:   NA     Changes in Health Issues:   None reported     Chemical Use Review:   Substance Use: Chemical use reviewed, no active concerns identified      Tobacco Use: No current tobacco use.      Diagnosis:  1. PILAR (generalized anxiety disorder)      Collateral Reports Completed:   Not Applicable    PLAN: (Patient Tasks / Therapist Tasks / Other)  Homework:  Begin grief process and also prepare for move.      Elizabeth Vu, [facility]                                     ______________________________________________________________________________________________________________________                                              Individual Treatment Plan    Patient's Name: Deandra Colón  YOB: 1965    Date of Creation: 5/12/2022  Date Treatment Plan Last Reviewed/Revised: 12/3/2024    DSM5 Diagnoses: 300.02 (F41.1) Generalized Anxiety Disorder  Psychosocial / Contextual Factors: Job loss, single, financial  PROMIS (reviewed every 90 days): 12/3/2024 Score: 27    Referral / Collaboration:  Referral to another professional/service is not indicated at this time.    Anticipated number  of session for this episode of care: 12  Anticipation frequency of session: Every 2-3 months  Anticipated Duration of each session: 38-52 minutes  Treatment plan will be reviewed in 90 days or when goals have been changed.     MeasurableTreatment Goal(s) related to diagnosis / functional impairment(s)  Goal 1: Client will lower GAD7 score to 3 or below.    I will know I've met my goal when I'm less anxious.      Objective #A (Client Action)    Client will Increase interest, engagement, and pleasure in doing things.  Status: Continued- Date(s): 12/3/2024    Intervention(s)  Therapist will assign homework to increase activity level.    Objective #B  Client will work on finding new job.  Status: Continued- Date(s): 12/3/2024    Intervention(s)  Therapist will encourage client in job search.    Objective #C  Client will Identify negative self-talk and behaviors: challenge core beliefs, myths, and actions.  Status: Continued - Date(s): 12/3/2024    Intervention(s)  Therapist will teach emotional recognition/identification and improve self talk.      Patient has reviewed and agreed to the above plan.      Elizabeth Ivy, LMFT

## 2024-12-06 ENCOUNTER — TRANSFERRED RECORDS (OUTPATIENT)
Dept: HEALTH INFORMATION MANAGEMENT | Facility: CLINIC | Age: 59
End: 2024-12-06
Payer: COMMERCIAL

## 2024-12-12 ENCOUNTER — OFFICE VISIT (OUTPATIENT)
Dept: FAMILY MEDICINE | Facility: CLINIC | Age: 59
End: 2024-12-12
Payer: COMMERCIAL

## 2024-12-12 VITALS
DIASTOLIC BLOOD PRESSURE: 75 MMHG | HEART RATE: 65 BPM | RESPIRATION RATE: 14 BRPM | WEIGHT: 122.7 LBS | OXYGEN SATURATION: 100 % | BODY MASS INDEX: 23.17 KG/M2 | TEMPERATURE: 97.2 F | HEIGHT: 61 IN | SYSTOLIC BLOOD PRESSURE: 111 MMHG

## 2024-12-12 DIAGNOSIS — F33.9 RECURRENT MAJOR DEPRESSIVE DISORDER, REMISSION STATUS UNSPECIFIED (H): ICD-10-CM

## 2024-12-12 DIAGNOSIS — Z23 NEED FOR VACCINATION: ICD-10-CM

## 2024-12-12 DIAGNOSIS — D72.9 ABNORMAL WHITE BLOOD CELL (WBC) COUNT: ICD-10-CM

## 2024-12-12 LAB
BASOPHILS # BLD AUTO: 0 10E3/UL (ref 0–0.2)
BASOPHILS NFR BLD AUTO: 1 %
EOSINOPHIL # BLD AUTO: 0.2 10E3/UL (ref 0–0.7)
EOSINOPHIL NFR BLD AUTO: 5 %
ERYTHROCYTE [DISTWIDTH] IN BLOOD BY AUTOMATED COUNT: 13.7 % (ref 10–15)
HCT VFR BLD AUTO: 38.4 % (ref 35–47)
HGB BLD-MCNC: 12.4 G/DL (ref 11.7–15.7)
IMM GRANULOCYTES # BLD: 0 10E3/UL
IMM GRANULOCYTES NFR BLD: 0 %
LYMPHOCYTES # BLD AUTO: 1.5 10E3/UL (ref 0.8–5.3)
LYMPHOCYTES NFR BLD AUTO: 36 %
MCH RBC QN AUTO: 26.2 PG (ref 26.5–33)
MCHC RBC AUTO-ENTMCNC: 32.3 G/DL (ref 31.5–36.5)
MCV RBC AUTO: 81 FL (ref 78–100)
MONOCYTES # BLD AUTO: 0.3 10E3/UL (ref 0–1.3)
MONOCYTES NFR BLD AUTO: 8 %
NEUTROPHILS # BLD AUTO: 2.2 10E3/UL (ref 1.6–8.3)
NEUTROPHILS NFR BLD AUTO: 51 %
PLATELET # BLD AUTO: 232 10E3/UL (ref 150–450)
RBC # BLD AUTO: 4.73 10E6/UL (ref 3.8–5.2)
WBC # BLD AUTO: 4.3 10E3/UL (ref 4–11)

## 2024-12-12 ASSESSMENT — PAIN SCALES - GENERAL: PAINLEVEL_OUTOF10: NO PAIN (0)

## 2024-12-12 NOTE — PROGRESS NOTES
"  Assessment & Plan     Recurrent major depressive disorder, remission status unspecified (H)  - stable, refill below   - sertraline (ZOLOFT) 50 MG tablet; Take 1 tablet (50 mg) by mouth daily.    Abnormal white blood cell (WBC) count  - recheck   - CBC with platelets and differential; Future  - CBC with platelets and differential    Need for vaccination  - INFLUENZA VACCINE TRIVALENT(FLUBLOK)  - TDAP 7+ (ADACEL,BOOSTRIX)        The longitudinal plan of care for the diagnosis(es)/condition(s) as documented were addressed during this visit. Due to the added complexity in care, I will continue to support Deandra in the subsequent management and with ongoing continuity of care.         Subjective   Deandra is a 59 year old, presenting for the following health issues:  Kidney Function      12/12/2024     8:16 AM   Additional Questions   Roomed by Devorah DAI     History of Present Illness       Reason for visit:  Follow up visit    She eats 2-3 servings of fruits and vegetables daily.She consumes 0 sweetened beverage(s) daily.She exercises with enough effort to increase her heart rate 60 or more minutes per day.  She exercises with enough effort to increase her heart rate 3 or less days per week.   She is taking medications regularly.       Mom recently passed away.   She is looking for a new job, her job ends.  She feels that mood is stable.           Objective    /75   Pulse 65   Temp 97.2  F (36.2  C) (Temporal)   Resp 14   Ht 1.542 m (5' 0.71\")   Wt 55.7 kg (122 lb 11.2 oz)   SpO2 100%   BMI 23.41 kg/m    Body mass index is 23.41 kg/m .  Physical Exam               Signed Electronically by: Dominic Hickman MD    "

## 2024-12-12 NOTE — NURSING NOTE
Prior to immunization administration, verified patients identity using patient s name and date of birth. Please see Immunization Activity for additional information.     Screening Questionnaire for Adult Immunization    Are you sick today?   No   Do you have allergies to medications, food, a vaccine component or latex?   No   Have you ever had a serious reaction after receiving a vaccination?   No   Do you have a long-term health problem with heart, lung, kidney, or metabolic disease (e.g., diabetes), asthma, a blood disorder, no spleen, complement component deficiency, a cochlear implant, or a spinal fluid leak?  Are you on long-term aspirin therapy?   No   Do you have cancer, leukemia, HIV/AIDS, or any other immune system problem?   No   Do you have a parent, brother, or sister with an immune system problem?   No   In the past 3 months, have you taken medications that affect  your immune system, such as prednisone, other steroids, or anticancer drugs; drugs for the treatment of rheumatoid arthritis, Crohn s disease, or psoriasis; or have you had radiation treatments?   No   Have you had a seizure, or a brain or other nervous system problem?   No   During the past year, have you received a transfusion of blood or blood    products, or been given immune (gamma) globulin or antiviral drug?   No   For women: Are you pregnant or is there a chance you could become       pregnant during the next month?   No   Have you received any vaccinations in the past 4 weeks?   No     Immunization questionnaire answers were all negative.      Patient instructed to remain in clinic for 15 minutes afterwards, and to report any adverse reactions.     Screening performed by Isela Lagunas MA on 12/12/2024 at 9:18 AM.

## 2024-12-26 ENCOUNTER — PATIENT OUTREACH (OUTPATIENT)
Dept: CARE COORDINATION | Facility: CLINIC | Age: 59
End: 2024-12-26
Payer: COMMERCIAL

## 2025-01-09 ENCOUNTER — PATIENT OUTREACH (OUTPATIENT)
Dept: CARE COORDINATION | Facility: CLINIC | Age: 60
End: 2025-01-09
Payer: COMMERCIAL

## 2025-02-14 NOTE — TELEPHONE ENCOUNTER
Patient calling not to complete pre assessment but to discuss  policy.     Patient states they do not have a  and asking if they can stay on unit and then take an uber home. Reviewed  policy with patient that if having sedation, they will need a  and someone to be able to stay with them when they get home. They would not be able to take pubic transportation without having someone accompanying them. Patient questioned what if they don't have a . Writer explained if unit is not able to confirm a  then they would not proceed with procedure with sedation.     Patient requested to be transferred to endoscopy scheduling. Transferred per request.     Umu Reece, KATRINA  Endoscopy Procedure Pre Assessment RN   Referral from PCP for 2nd opinion/current pt of Dr Bergman.  Please advise

## 2025-02-19 ENCOUNTER — PATIENT OUTREACH (OUTPATIENT)
Dept: CARE COORDINATION | Facility: CLINIC | Age: 60
End: 2025-02-19
Payer: COMMERCIAL

## 2025-03-15 ENCOUNTER — HEALTH MAINTENANCE LETTER (OUTPATIENT)
Age: 60
End: 2025-03-15

## 2025-06-07 ENCOUNTER — HEALTH MAINTENANCE LETTER (OUTPATIENT)
Age: 60
End: 2025-06-07

## (undated) DEVICE — JELLY LUBRICATING SURGILUBE 2OZ TUBE

## (undated) DEVICE — ENDO BITE BLOCK ADULT OMNI-BLOC

## (undated) DEVICE — SOL WATER IRRIG 500ML BOTTLE 2F7113

## (undated) DEVICE — GLOVE EXAM NITRILE LG PF LATEX FREE 5064

## (undated) DEVICE — BASIN SET SINGLE STERILE 13752-624

## (undated) DEVICE — SUCTION MANIFOLD NEPTUNE 2 SYS 4 PORT 0702-020-000

## (undated) DEVICE — GOWN IMPERVIOUS 2XL BLUE

## (undated) DEVICE — SUCTION MANIFOLD NEPTUNE 2 SYS 1 PORT 702-025-000

## (undated) DEVICE — DEVICE RETRIEVAL ROTH NET PLATINUM UNIV 2.5MMX230CM 00715050

## (undated) DEVICE — NDL SCLEROTHERAPY 25GA CARR-LOCK  00711811

## (undated) DEVICE — SYR 50ML SLIP TIP W/O NDL 309654

## (undated) DEVICE — ENDO FORCEP BX CAPTURA PRO SPIKE G50696

## (undated) DEVICE — SNARE CAPIVATOR ROUND COLD SNR BX10 M00561101

## (undated) DEVICE — KIT ENDO FIRST STEP DISINFECTANT 200ML W/POUCH EP-4

## (undated) DEVICE — TUBING SUCTION 12"X1/4" N612

## (undated) DEVICE — CATH BALLOON MERIT ESOPH FIVE-STAGE 17X21MMX180CM EX18

## (undated) DEVICE — TUBING SUCTION 10'X3/16" N510

## (undated) DEVICE — DRAPE SHEET REV FOLD 3/4 9349

## (undated) DEVICE — SPECIMEN CONTAINER 3OZ W/FORMALIN 59901

## (undated) DEVICE — KIT ENDO TURNOVER/PROCEDURE CARRY-ON 101822

## (undated) RX ORDER — FENTANYL CITRATE 50 UG/ML
INJECTION, SOLUTION INTRAMUSCULAR; INTRAVENOUS
Status: DISPENSED
Start: 2023-03-15

## (undated) RX ORDER — LIDOCAINE HYDROCHLORIDE 10 MG/ML
INJECTION, SOLUTION EPIDURAL; INFILTRATION; INTRACAUDAL; PERINEURAL
Status: DISPENSED
Start: 2021-11-29

## (undated) RX ORDER — EPINEPHRINE 1 MG/ML
INJECTION, SOLUTION, CONCENTRATE INTRAVENOUS
Status: DISPENSED
Start: 2021-11-29